# Patient Record
Sex: MALE | Race: ASIAN | NOT HISPANIC OR LATINO
[De-identification: names, ages, dates, MRNs, and addresses within clinical notes are randomized per-mention and may not be internally consistent; named-entity substitution may affect disease eponyms.]

---

## 2017-01-31 ENCOUNTER — APPOINTMENT (OUTPATIENT)
Dept: UROLOGY | Facility: CLINIC | Age: 57
End: 2017-01-31

## 2017-02-03 ENCOUNTER — NON-APPOINTMENT (OUTPATIENT)
Age: 57
End: 2017-02-03

## 2017-02-03 ENCOUNTER — APPOINTMENT (OUTPATIENT)
Dept: INTERNAL MEDICINE | Facility: CLINIC | Age: 57
End: 2017-02-03

## 2017-02-03 VITALS
RESPIRATION RATE: 14 BRPM | WEIGHT: 243 LBS | HEIGHT: 70.47 IN | DIASTOLIC BLOOD PRESSURE: 82 MMHG | TEMPERATURE: 98.1 F | OXYGEN SATURATION: 98 % | SYSTOLIC BLOOD PRESSURE: 122 MMHG | HEART RATE: 84 BPM | BODY MASS INDEX: 34.4 KG/M2

## 2017-02-03 DIAGNOSIS — F19.21 OTHER PSYCHOACTIVE SUBSTANCE DEPENDENCE, IN REMISSION: ICD-10-CM

## 2017-02-28 ENCOUNTER — RX RENEWAL (OUTPATIENT)
Age: 57
End: 2017-02-28

## 2017-10-19 ENCOUNTER — APPOINTMENT (OUTPATIENT)
Dept: UROLOGY | Facility: CLINIC | Age: 57
End: 2017-10-19
Payer: MEDICAID

## 2017-10-19 PROCEDURE — 99204 OFFICE O/P NEW MOD 45 MIN: CPT

## 2017-10-20 LAB
ALBUMIN SERPL ELPH-MCNC: 4.5 G/DL
ALP BLD-CCNC: 72 U/L
ALT SERPL-CCNC: 34 U/L
ANION GAP SERPL CALC-SCNC: 14 MMOL/L
AST SERPL-CCNC: 23 U/L
BILIRUB SERPL-MCNC: 0.6 MG/DL
BUN SERPL-MCNC: 12 MG/DL
CALCIUM SERPL-MCNC: 9.7 MG/DL
CHLORIDE SERPL-SCNC: 98 MMOL/L
CO2 SERPL-SCNC: 26 MMOL/L
CREAT SERPL-MCNC: 1.1 MG/DL
ESTRADIOL SERPL-MCNC: 23 PG/ML
GLUCOSE SERPL-MCNC: 258 MG/DL
POTASSIUM SERPL-SCNC: 4.7 MMOL/L
PROLACTIN SERPL-MCNC: 9 NG/ML
PROT SERPL-MCNC: 7.4 G/DL
PSA SERPL-MCNC: 1.48 NG/ML
SODIUM SERPL-SCNC: 138 MMOL/L

## 2017-10-26 ENCOUNTER — MOBILE ON CALL (OUTPATIENT)
Age: 57
End: 2017-10-26

## 2017-10-26 LAB
TESTOST BND SERPL-MCNC: 8.1 PG/ML
TESTOST SERPL-MCNC: 247.3 NG/DL

## 2017-11-15 ENCOUNTER — APPOINTMENT (OUTPATIENT)
Dept: UROLOGY | Facility: CLINIC | Age: 57
End: 2017-11-15
Payer: MEDICAID

## 2017-11-15 ENCOUNTER — LABORATORY RESULT (OUTPATIENT)
Age: 57
End: 2017-11-15

## 2017-11-15 PROCEDURE — 99213 OFFICE O/P EST LOW 20 MIN: CPT

## 2017-11-17 LAB
APPEARANCE: CLEAR
BACTERIA UR CULT: NORMAL
BACTERIA: NEGATIVE
BILIRUBIN URINE: NEGATIVE
BLOOD URINE: NEGATIVE
COLOR: YELLOW
GLUCOSE QUALITATIVE U: 1000 MG/DL
KETONES URINE: NEGATIVE
LEUKOCYTE ESTERASE URINE: NEGATIVE
MICROSCOPIC-UA: NORMAL
NITRITE URINE: NEGATIVE
PH URINE: 5
PROTEIN URINE: 30 MG/DL
RED BLOOD CELLS URINE: 3 /HPF
SPECIFIC GRAVITY URINE: 1.04
SQUAMOUS EPITHELIAL CELLS: 0 /HPF
TESTOST SERPL-MCNC: 209.9 NG/DL
UROBILINOGEN URINE: NEGATIVE MG/DL
WHITE BLOOD CELLS URINE: 1 /HPF

## 2017-12-06 ENCOUNTER — APPOINTMENT (OUTPATIENT)
Dept: UROLOGY | Facility: CLINIC | Age: 57
End: 2017-12-06
Payer: MEDICAID

## 2017-12-06 PROCEDURE — 99213 OFFICE O/P EST LOW 20 MIN: CPT

## 2018-02-07 ENCOUNTER — FORM ENCOUNTER (OUTPATIENT)
Age: 58
End: 2018-02-07

## 2018-02-08 ENCOUNTER — OUTPATIENT (OUTPATIENT)
Dept: OUTPATIENT SERVICES | Facility: HOSPITAL | Age: 58
LOS: 1 days | End: 2018-02-08
Payer: MEDICAID

## 2018-02-08 ENCOUNTER — APPOINTMENT (OUTPATIENT)
Dept: RADIOLOGY | Facility: IMAGING CENTER | Age: 58
End: 2018-02-08
Payer: MEDICAID

## 2018-02-08 DIAGNOSIS — E29.1 TESTICULAR HYPOFUNCTION: ICD-10-CM

## 2018-02-08 PROCEDURE — 77080 DXA BONE DENSITY AXIAL: CPT | Mod: 26

## 2018-02-08 PROCEDURE — 77080 DXA BONE DENSITY AXIAL: CPT

## 2018-06-27 ENCOUNTER — APPOINTMENT (OUTPATIENT)
Dept: UROLOGY | Facility: CLINIC | Age: 58
End: 2018-06-27
Payer: MEDICAID

## 2018-06-27 PROCEDURE — 99214 OFFICE O/P EST MOD 30 MIN: CPT

## 2018-06-28 LAB
ANION GAP SERPL CALC-SCNC: 18 MMOL/L
APPEARANCE: CLEAR
BACTERIA: NEGATIVE
BILIRUBIN URINE: NEGATIVE
BLOOD URINE: NEGATIVE
BUN SERPL-MCNC: 14 MG/DL
CALCIUM SERPL-MCNC: 9.3 MG/DL
CHLORIDE SERPL-SCNC: 96 MMOL/L
CO2 SERPL-SCNC: 23 MMOL/L
COLOR: YELLOW
CREAT SERPL-MCNC: 0.94 MG/DL
GLUCOSE QUALITATIVE U: 500 MG/DL
GLUCOSE SERPL-MCNC: 243 MG/DL
HBA1C MFR BLD HPLC: 10.5 %
HYALINE CASTS: 2 /LPF
KETONES URINE: NEGATIVE
LEUKOCYTE ESTERASE URINE: NEGATIVE
MICROSCOPIC-UA: NORMAL
NITRITE URINE: NEGATIVE
PH URINE: 5
POTASSIUM SERPL-SCNC: 4.9 MMOL/L
PROTEIN URINE: 30 MG/DL
RED BLOOD CELLS URINE: 0 /HPF
SODIUM SERPL-SCNC: 137 MMOL/L
SPECIFIC GRAVITY URINE: 1.02
SQUAMOUS EPITHELIAL CELLS: 0 /HPF
TESTOST SERPL-MCNC: 255 NG/DL
UROBILINOGEN URINE: NEGATIVE MG/DL
WHITE BLOOD CELLS URINE: 0 /HPF

## 2018-06-29 LAB — BACTERIA UR CULT: NORMAL

## 2018-09-06 ENCOUNTER — APPOINTMENT (OUTPATIENT)
Dept: INTERNAL MEDICINE | Facility: CLINIC | Age: 58
End: 2018-09-06
Payer: MEDICAID

## 2018-09-06 ENCOUNTER — NON-APPOINTMENT (OUTPATIENT)
Age: 58
End: 2018-09-06

## 2018-09-06 VITALS
OXYGEN SATURATION: 98 % | WEIGHT: 234 LBS | TEMPERATURE: 98.1 F | RESPIRATION RATE: 14 BRPM | BODY MASS INDEX: 33.5 KG/M2 | SYSTOLIC BLOOD PRESSURE: 130 MMHG | HEART RATE: 80 BPM | HEIGHT: 70 IN | DIASTOLIC BLOOD PRESSURE: 72 MMHG

## 2018-09-06 DIAGNOSIS — Z87.438 PERSONAL HISTORY OF OTHER DISEASES OF MALE GENITAL ORGANS: ICD-10-CM

## 2018-09-06 DIAGNOSIS — Z83.79 FAMILY HISTORY OF OTHER DISEASES OF THE DIGESTIVE SYSTEM: ICD-10-CM

## 2018-09-06 DIAGNOSIS — R31.29 OTHER MICROSCOPIC HEMATURIA: ICD-10-CM

## 2018-09-06 PROCEDURE — 93000 ELECTROCARDIOGRAM COMPLETE: CPT

## 2018-09-06 PROCEDURE — 99406 BEHAV CHNG SMOKING 3-10 MIN: CPT

## 2018-09-06 PROCEDURE — 99396 PREV VISIT EST AGE 40-64: CPT | Mod: 25

## 2018-09-06 PROCEDURE — 36415 COLL VENOUS BLD VENIPUNCTURE: CPT

## 2018-09-06 RX ORDER — FLUTICASONE PROPIONATE 50 UG/1
50 SPRAY, METERED NASAL DAILY
Qty: 1 | Refills: 2 | Status: DISCONTINUED | COMMUNITY
Start: 2017-02-03 | End: 2018-09-06

## 2018-09-06 RX ORDER — NYSTATIN 100000 [USP'U]/G
100000 CREAM TOPICAL TWICE DAILY
Qty: 2 | Refills: 0 | Status: DISCONTINUED | COMMUNITY
Start: 2017-10-19 | End: 2018-09-06

## 2018-09-06 RX ORDER — FENOFIBRATE 160 MG/1
160 TABLET ORAL DAILY
Qty: 1 | Refills: 1 | Status: DISCONTINUED | COMMUNITY
Start: 2017-02-03 | End: 2018-09-06

## 2018-09-08 PROBLEM — Z87.438 HISTORY OF PENILE PRURITUS: Status: RESOLVED | Noted: 2017-10-19 | Resolved: 2018-09-08

## 2018-09-08 PROBLEM — R31.29 MICROSCOPIC HEMATURIA: Status: RESOLVED | Noted: 2017-11-15 | Resolved: 2018-09-08

## 2018-09-08 NOTE — REVIEW OF SYSTEMS
[see HPI] : see HPI [Negative] : Respiratory [Fever] : no fever [Chills] : no chills [Feeling Poorly] : not feeling poorly [Sore Throat] : no sore throat [Hoarseness] : no hoarseness [Chest Pain] : no chest pain [Palpitations] : no palpitations [Lower Ext Edema] : no extremity edema [Shortness Of Breath] : no shortness of breath [Cough] : no cough [Orthopnea] : no orthopnea [Wheezing] : no wheezing [SOB on Exertion] : no shortness of breath during exertion [PND] : no PND [Abdominal Pain] : no abdominal pain [Vomiting] : no vomiting [Melena] : no melena [Dysuria] : no dysuria [Genital Lesion] : no genital lesions [Dizziness] : no dizziness [Limb Weakness] : no limb weakness [Suicidal] : not suicidal [Anxiety] : no anxiety [Depression] : no depression

## 2018-09-08 NOTE — COUNSELING
[Weight management counseling provided] : Weight management [Healthy eating counseling provided] : healthy eating [Activity counseling provided] : activity [Smoking cessation counseling provided] : smoking cessation [Behavioral health counseling provided] : behavioral health  [Quit Smoking] : Quit smoking [Participate in a class] : Participate in a class

## 2018-09-08 NOTE — HEALTH RISK ASSESSMENT
[Patient reported colonoscopy was normal] : Patient reported colonoscopy was normal [0] : 2) Feeling down, depressed, or hopeless: Not at all (0) [HIV Test offered] : HIV Test offered [Patient reported bone density results were normal] : Patient reported bone density results were normal [BoneDensityDate] : 2/18 [ColonoscopyDate] : 12/14 [HIVDate] : 12/16 [HIVComments] : negative [HepatitisCDate] : 12/16 [HepatitisCComments] : negative

## 2018-09-08 NOTE — HISTORY OF PRESENT ILLNESS
[Health Maintenance] : health maintenance [___ Year(s) Ago] : [unfilled] year(s) ago [Spouse] : spouse [___ Daughter(s)] : [unfilled] daughter(s) [] :  [Working Full Time] : working full time [Occupation ___] : occupation: [unfilled] [Current Cigarette Smoker] : is a current cigarette smoker [Ready] : is ready to quit using tobacco [Wants Resources] : wants resources to help with quitting [Occasional Use] : occasional alcohol use [Former User] : has previously used illicit drugs [Marijuana] : marijuana [Cocaine] : cocaine [Good] : good [Reg. Dental Visits] : He has regular dental visits [FreeTextEntry1] : \par Feeling well today.\par Fasting for labs.\par Needs med refill.\par \par Last seen in office 2/3/17 at last CPE, states feeling well since.  Denies ER visits or hospitalizations.\par \par \par c/o left shoulder pain x 1 week with onset after lifting heavy suit case on trip.  Sx's stable.  Mainly focal pain, occ right upper neck and upper arm also involved, constant, worse with certain positions.  \par -no paresthesias, joint swelling, redness or weakness\par -took 1 advil x1 few days ago, none since.\par \par Has lost ~ 9 lbs since last visit \par Reports nl appetite and BMs\par Eating healthy, more veggies, less sugar\par Not exercising regularly, walks for 1 hr on occasion- done w/o sx's or limitation\par \par DM-- not interested in medications, cut down on carbs\par -hx metformin, self d/c'd > 1 yr ago as did not want to be on meds, denies SEs with use\par -hx home fs, not done recently as needs more strips\par -hx optho eval 3/16\par -hx podiatry in 11/14, denies foot complaints.\par \par HLD--\par -hx fenofibrate, self d/c'd > 1 yr ago as did not want to take meds, denies SEs\par \par hx PINKY--on CPAP, sleeping well\par -hx sleep study 1/16.\par -hx followed by Dr. Ibrahima hogan\par \par hx depression-- feels is resolved, but occ has trouble focusing; denies HI/SI\par -hx marital therapist eval with wife in 2016, felt was helpful\par -psychiatry eval pending, wants referral again\par \par hx hypogonadism, microscopic hematuria, ED\par -followed by LORAINE\par -on viagra prn with help\par -denies any urinary complaints\par \par +cigs-- smokes 1/2 ppd > 30 yrs interested to quit, no hx attempt with med use in past, interested in using patch, open to referral to cessation center\par -hx occ cough a/w postnasal drip, improved with flonase use prn\par \par  [Binge Drinking] : denies binge drinking [Patient Concern] : no personal concern about alcohol use [Vision Problems] : He denies vision problems [Hearing Loss] : He denies hearing loss [de-identified] : 1/2 ppd > 30 yrs [de-identified] : recreational [de-identified] : snorted cocaine, smoked marijuana; denies hx IVDA [de-identified] : declines flu shots, no hx hep B series, Tdap or PVX--declines; no hx shingles- declines

## 2018-09-08 NOTE — ASSESSMENT
[FreeTextEntry1] : \par shoulder pain- left, likely tendonitis\par -advised avoidance of heavy lifting/bag\par -advised ice/heat, Tylenol prn\par -to f/u if sx's worsen or do not resolve to consider xray/ortho eval at that time\par \par DM- 6/1840T1s-1.05 (was 9.2); 12/16 microalbumin 58; has lost some wt intentionally\par -EKG today: NSR @ 79 bpm, nl axis, no LVH, no path Q/ST chgs (no chg c/w 9/14)\par -declines medications, willing to reconsider after repeat labs today\par -check A1c and microalbumin \par -hx metformin self d/c\par -encouraged home fs checks, Rx for strips escribed\par -ADA diet, exercise and wt loss counseled.  \par -hx f/u with DM educator, s/p sessions- declines f/u\par -hx ophto eval 3/16, yearly advised, referral given\par -hx pod eval 11/14, using orthotics--> yearly advised and referral given. \par -adverse health issues associated with poorly controlled DM counseled (ie MI, renal failure, vision impairment, etc.) \par \par HLD/TG--12/16 Tchol 259 TG 1028 (was 829) HDL 29; LFTs nl\par -check lipids/LFTs\par -hx fenofibrate self d/c, hesitant to restart but willing to consider after repeat labs today \par -hx declining statin for CV risk reduction in setting of DM\par -low fat diet, exercise and wt loss \par -hx nutrition eval, declines f/u\par \par PINKY--hx CPAP, sleeping well, some concentration issues\par -hx followed by ENT\par -hx followed by Dr. Ibrahima hogan- s/p eval and repeat sleep study 1/16- f/u encouraged \par \par hx depression/anxiety--resolved per pt; denies HI/SI\par -psychiatry referral given prior--pending\par -hx martial therapy, advised to consider f/u as needed\par -advised to f/u if sx's worsen\par \par hx allergic rhinitis- hx occ cough a/w postnasal drip\par -11/14 cxr negative\par -cont flonase prn\par \par microscopic hematuria, hypogonadism, ED- \par -11/17 urine cytology negative\par -10/17 PSA wnl\par -2/18 DEXA wnl\par -followed by LORAINE, seen 6/18- advised f/u in 6 mo\par -on viagra prn\par \par obesity--\par -healthy eating, exercise and cont'd wt loss advised\par \par vit d def-- \par -check level\par \par \par HCM\par --check screening labs; agreeable to HIV/STD screening\par --12/16 hep C screening negative\par --declines flu shots\par --declines hep B series/Tdap/ PVX/shingles vaccines\par --hx screening colonoscopy 12/15/14 (Dr. Couch), +int/ext hemorrhoids.  Rec repeat in 5 yrs\par --hx nl full skin exam by derm 2/15 per pt, yearly advised.  Regular use of sun block for skin cancer prevention counseled\par --smoking cessation and cont'd cessation of illicit drugs encouraged. [facility] cessation center info given again.  Rx for nicotine patches escribed.  Consider screening CT chest at next visit.\par \par \par Pt's cell: [phone]\par

## 2018-09-09 LAB
25(OH)D3 SERPL-MCNC: 16.7 NG/ML
ALBUMIN SERPL ELPH-MCNC: 4.5 G/DL
ALP BLD-CCNC: 52 U/L
ALT SERPL-CCNC: 28 U/L
ANION GAP SERPL CALC-SCNC: 15 MMOL/L
AST SERPL-CCNC: 23 U/L
BASOPHILS # BLD AUTO: 0.05 K/UL
BASOPHILS NFR BLD AUTO: 0.8 %
BILIRUB SERPL-MCNC: 0.9 MG/DL
BUN SERPL-MCNC: 10 MG/DL
C TRACH RRNA SPEC QL NAA+PROBE: NOT DETECTED
CALCIUM SERPL-MCNC: 9.2 MG/DL
CHLORIDE SERPL-SCNC: 102 MMOL/L
CHOLEST SERPL-MCNC: 286 MG/DL
CHOLEST/HDLC SERPL: 10.6 RATIO
CO2 SERPL-SCNC: 21 MMOL/L
CREAT SERPL-MCNC: 0.76 MG/DL
CREAT SPEC-SCNC: 243 MG/DL
CREAT SPEC-SCNC: 243 MG/DL
CREAT/PROT UR: 0.2 RATIO
EOSINOPHIL # BLD AUTO: 0.17 K/UL
EOSINOPHIL NFR BLD AUTO: 2.7 %
GLUCOSE SERPL-MCNC: 200 MG/DL
HBA1C MFR BLD HPLC: 10.4 %
HBV CORE IGG+IGM SER QL: NONREACTIVE
HBV SURFACE AB SER QL: NONREACTIVE
HBV SURFACE AG SER QL: NONREACTIVE
HCT VFR BLD CALC: 46.7 %
HCV AB SER QL: NONREACTIVE
HCV S/CO RATIO: 0.2 S/CO
HDLC SERPL-MCNC: 27 MG/DL
HGB BLD-MCNC: 15.8 G/DL
HIV1+2 AB SPEC QL IA.RAPID: NONREACTIVE
IMM GRANULOCYTES NFR BLD AUTO: 0.2 %
LDLC SERPL CALC-MCNC: NORMAL
LYMPHOCYTES # BLD AUTO: 2.07 K/UL
LYMPHOCYTES NFR BLD AUTO: 33.3 %
MAN DIFF?: NORMAL
MCHC RBC-ENTMCNC: 30.2 PG
MCHC RBC-ENTMCNC: 33.8 GM/DL
MCV RBC AUTO: 89.1 FL
MICROALBUMIN 24H UR DL<=1MG/L-MCNC: 15.8 MG/DL
MICROALBUMIN/CREAT 24H UR-RTO: 65 MG/G
MONOCYTES # BLD AUTO: 0.35 K/UL
MONOCYTES NFR BLD AUTO: 5.6 %
N GONORRHOEA RRNA SPEC QL NAA+PROBE: NOT DETECTED
NEUTROPHILS # BLD AUTO: 3.56 K/UL
NEUTROPHILS NFR BLD AUTO: 57.4 %
PLATELET # BLD AUTO: 192 K/UL
POTASSIUM SERPL-SCNC: 4 MMOL/L
PROT SERPL-MCNC: 7.5 G/DL
PROT UR-MCNC: 45 MG/DL
RBC # BLD: 5.24 M/UL
RBC # FLD: 13.2 %
SODIUM SERPL-SCNC: 138 MMOL/L
SOURCE AMPLIFICATION: NORMAL
T PALLIDUM AB SER QL IA: NEGATIVE
TRIGL SERPL-MCNC: 993 MG/DL
TSH SERPL-ACNC: 0.87 UIU/ML
WBC # FLD AUTO: 6.21 K/UL

## 2018-10-17 ENCOUNTER — APPOINTMENT (OUTPATIENT)
Dept: INTERNAL MEDICINE | Facility: CLINIC | Age: 58
End: 2018-10-17

## 2019-02-06 ENCOUNTER — APPOINTMENT (OUTPATIENT)
Dept: INTERNAL MEDICINE | Facility: CLINIC | Age: 59
End: 2019-02-06

## 2020-01-29 ENCOUNTER — APPOINTMENT (OUTPATIENT)
Dept: UROLOGY | Facility: CLINIC | Age: 60
End: 2020-01-29
Payer: MEDICAID

## 2020-01-29 DIAGNOSIS — R31.29 OTHER MICROSCOPIC HEMATURIA: ICD-10-CM

## 2020-01-29 PROCEDURE — 99213 OFFICE O/P EST LOW 20 MIN: CPT

## 2020-01-29 NOTE — PHYSICAL EXAM
[Penis Abnormality] : normal uncircumcised penis [Urethral Meatus] : meatus normal [Urinary Bladder Findings] : the bladder was normal on palpation [Scrotum] : the scrotum was normal [No Prostate Nodules] : no prostate nodules [Testes Mass (___cm)] : there were no testicular masses [] : no rash [FreeTextEntry1] : no evidence of candida

## 2020-01-29 NOTE — ASSESSMENT
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  Will continue sildenafil\par Will change to sildenafil 100 mg\par emphasized change in tablet dose and not to exceed 100 mg\par emphasized long term effect of untreated diabetes mellitus\par \par Hypogonadism had been rechecked and was normal\par Patient states maintained libido\par Will reassess today\par reasonably satisfied with sexual function \par Not interested in further intervention at this time\par \par Discussed PSA monitoring and controversy. \par Discussed fact that insurance/medicare would not pay.\par Patient wants to proceed,\par \par

## 2020-01-29 NOTE — HISTORY OF PRESENT ILLNESS
[None] : no symptoms [Nocturia] : nocturia [Erectile Dysfunction] : Erectile Dysfunction [FreeTextEntry1] : 57 year old   with 3 children complaining of penile discomfort x 3 weeks\par Started with itching and itching\par Urinating well; no dysuria etc\par Also complaining of Erectile dysfunction.\par Not attempting SI x several years.\par Loss of libido\par FRANCES 1\par Conflict with partner (unrelated to sexual difficulties)\par Other partners not able to have SI\par + ejaculation\par \par 11.15.2017\par Patient returns to review progress with penile pain and ED\par Topical therapy and hygiene has resulted in resolution of penile pain\par Erectile function has improved with sildenafil; Has not achieved rigid erection but has only utilized sildenafil 40 mg.\par \par 12.6.2017\par Patient returns to followup on:\par 1. penile pain\par 2. erectile dysfunction\par 3. hypogonadism\par 4. microscopic hematuria\par \par 6.27.2018\par no further penile pain\par erection excellent on sildenafil 100 mg\par no urinary symptoms\par  \par \par 1.29.2020\par continues on sildenfil\par getting erection \par sexually active\par has followed up re. diabetes mellitus but has not taken medication

## 2020-01-30 LAB
APPEARANCE: CLEAR
BACTERIA: NEGATIVE
BILIRUBIN URINE: NEGATIVE
BLOOD URINE: NEGATIVE
COLOR: YELLOW
ESTIMATED AVERAGE GLUCOSE: 266 MG/DL
GLUCOSE QUALITATIVE U: ABNORMAL
HBA1C MFR BLD HPLC: 10.9 %
HYALINE CASTS: 1 /LPF
KETONES URINE: NORMAL
LEUKOCYTE ESTERASE URINE: NEGATIVE
MICROSCOPIC-UA: NORMAL
NITRITE URINE: NEGATIVE
PH URINE: 5.5
PROTEIN URINE: ABNORMAL
PSA SERPL-MCNC: 1.79 NG/ML
RED BLOOD CELLS URINE: 3 /HPF
SPECIFIC GRAVITY URINE: 1.05
SQUAMOUS EPITHELIAL CELLS: 0 /HPF
TESTOST SERPL-MCNC: 304 NG/DL
UROBILINOGEN URINE: NORMAL
WHITE BLOOD CELLS URINE: 1 /HPF

## 2020-04-21 ENCOUNTER — APPOINTMENT (OUTPATIENT)
Dept: GASTROENTEROLOGY | Facility: CLINIC | Age: 60
End: 2020-04-21

## 2020-05-11 ENCOUNTER — APPOINTMENT (OUTPATIENT)
Dept: GASTROENTEROLOGY | Facility: CLINIC | Age: 60
End: 2020-05-11
Payer: MEDICAID

## 2020-05-11 VITALS — BODY MASS INDEX: 30.8 KG/M2 | HEIGHT: 71 IN | WEIGHT: 220 LBS

## 2020-05-11 DIAGNOSIS — R80.9 PROTEINURIA, UNSPECIFIED: ICD-10-CM

## 2020-05-11 PROCEDURE — 99214 OFFICE O/P EST MOD 30 MIN: CPT | Mod: 95

## 2020-05-11 RX ORDER — FLUTICASONE PROPIONATE 50 UG/1
50 SPRAY, METERED NASAL
Qty: 16 | Refills: 3 | Status: DISCONTINUED | COMMUNITY
Start: 2018-09-06 | End: 2020-05-11

## 2020-05-11 RX ORDER — NICOTINE POLACRILEX 2 MG/1
2 LOZENGE ORAL
Qty: 1 | Refills: 4 | Status: DISCONTINUED | COMMUNITY
Start: 2018-09-06 | End: 2020-05-11

## 2020-05-11 RX ORDER — NYSTATIN AND TRIAMCINOLONE ACETONIDE 100000; 1 MG/G; MG/G
100000-0.1 CREAM TOPICAL TWICE DAILY
Qty: 5 | Refills: 0 | Status: DISCONTINUED | COMMUNITY
Start: 2020-01-29 | End: 2020-05-11

## 2020-05-11 RX ORDER — NICOTINE 21 MG/24HR
14 PATCH, TRANSDERMAL 24 HOURS TRANSDERMAL
Qty: 14 | Refills: 0 | Status: DISCONTINUED | COMMUNITY
Start: 2018-09-06 | End: 2020-05-11

## 2020-05-11 RX ORDER — SILDENAFIL 20 MG/1
20 TABLET ORAL
Qty: 30 | Refills: 1 | Status: DISCONTINUED | COMMUNITY
Start: 2017-10-19 | End: 2020-05-11

## 2020-05-11 RX ORDER — ERGOCALCIFEROL 1.25 MG/1
1.25 MG CAPSULE, LIQUID FILLED ORAL
Qty: 8 | Refills: 0 | Status: DISCONTINUED | COMMUNITY
Start: 2018-09-09 | End: 2020-05-11

## 2020-05-11 RX ORDER — BLOOD SUGAR DIAGNOSTIC
STRIP MISCELLANEOUS
Qty: 1 | Refills: 5 | Status: DISCONTINUED | COMMUNITY
Start: 2018-09-06 | End: 2020-05-11

## 2020-05-11 RX ORDER — PHENYLEPHRINE HCL 10 MG
7 TABLET ORAL
Qty: 14 | Refills: 0 | Status: DISCONTINUED | COMMUNITY
Start: 2018-09-06 | End: 2020-05-11

## 2020-05-11 RX ORDER — NICOTINE 21 MG/24HR
21 PATCH, TRANSDERMAL 24 HOURS TRANSDERMAL
Qty: 42 | Refills: 0 | Status: DISCONTINUED | COMMUNITY
Start: 2018-09-06 | End: 2020-05-11

## 2020-05-11 RX ORDER — LISINOPRIL 2.5 MG/1
2.5 TABLET ORAL DAILY
Qty: 30 | Refills: 1 | Status: DISCONTINUED | COMMUNITY
Start: 2018-09-09 | End: 2020-05-11

## 2020-05-11 NOTE — CONSULT LETTER
[Dear  ___] : Dear  [unfilled], [Consult Letter:] : I had the pleasure of evaluating your patient, [unfilled]. [( Thank you for referring [unfilled] for consultation for _____ )] : Thank you for referring [unfilled] for consultation for [unfilled] [Please see my note below.] : Please see my note below. [FreeTextEntry3] : Fredis Couch MD [Consult Closing:] : Thank you very much for allowing me to participate in the care of this patient.  If you have any questions, please do not hesitate to contact me. [Sincerely,] : Sincerely,

## 2020-05-11 NOTE — HISTORY OF PRESENT ILLNESS
[FreeTextEntry1] : This is a telehealth visit.  Ellis was last seen in our office in 2014.  He has been feeling well and has no complaints referrable to the GI tract.  Denies change in bowel habits, abdominal pain, blood in the stool, nausea, vomiting or heartburn.  He has not been taking any medication, but is aware that he has diabetes and hypercholesterolemia.  His last hemoglobin A1c = 10.9.  Colonoscopy on December 15, 2014 revealed internal and external hemorrhoids.

## 2020-05-11 NOTE — REASON FOR VISIT
[Home] : at home, [unfilled] , at the time of the visit. [Medical Office: ([facility])___] : at the medical office located in  [Patient] : the patient [Self] : self [Follow-Up: _____] : a [unfilled] follow-up visit [FreeTextEntry4] : Brett Dent [FreeTextEntry1] : Pre colonoscopy visit

## 2020-05-11 NOTE — ASSESSMENT
[FreeTextEntry1] : 1.  History of rectal bleeding most likely secondary to internal hemorrhoids-colonoscopy December 15, 2014 revealed internal and external hemorrhoids-rule out colonic polyps.\par 2.  Type 2 diabetes with proteinuria.\par 3.  Hypercholesterolemia.\par 4.  Obesity.\par 5.  Obstructive sleep apnea.\par \par Plan:\par 1.  The patient was advised to schedule a colonoscopy.  The procedure, material risks, benefits and alternatives were discussed with the patient at length.  Brochure given. MiraLax prep.  The procedure will be scheduled as soon as it is feasible.  The patient was mailed the instructions for the procedure.\par 2.  Blood test results were reviewed.\par 3.  The patient was urged to contact his PCP, Dr. Dinora Tucker, for treatment of diabetes.\par \par

## 2020-05-11 NOTE — PHYSICAL EXAM
[General Appearance - Alert] : alert [General Appearance - In No Acute Distress] : in no acute distress [General Appearance - Well Developed] : well developed [General Appearance - Well-Appearing] : healthy appearing [] : normal voice and communication [FreeTextEntry1] : Obese

## 2020-06-21 ENCOUNTER — TRANSCRIPTION ENCOUNTER (OUTPATIENT)
Age: 60
End: 2020-06-21

## 2020-06-21 ENCOUNTER — APPOINTMENT (OUTPATIENT)
Dept: DISASTER EMERGENCY | Facility: CLINIC | Age: 60
End: 2020-06-21

## 2020-06-22 LAB — SARS-COV-2 N GENE NPH QL NAA+PROBE: NOT DETECTED

## 2020-06-24 ENCOUNTER — APPOINTMENT (OUTPATIENT)
Dept: GASTROENTEROLOGY | Facility: CLINIC | Age: 60
End: 2020-06-24
Payer: MEDICAID

## 2020-06-24 PROCEDURE — 45378 DIAGNOSTIC COLONOSCOPY: CPT

## 2020-09-23 ENCOUNTER — INPATIENT (INPATIENT)
Facility: HOSPITAL | Age: 60
LOS: 3 days | Discharge: HOME | End: 2020-09-27
Payer: MEDICAID

## 2020-09-23 VITALS
DIASTOLIC BLOOD PRESSURE: 89 MMHG | SYSTOLIC BLOOD PRESSURE: 131 MMHG | HEART RATE: 85 BPM | OXYGEN SATURATION: 98 % | TEMPERATURE: 98 F | RESPIRATION RATE: 16 BRPM

## 2020-09-23 DIAGNOSIS — K85.80 OTHER ACUTE PANCREATITIS WITHOUT NECROSIS OR INFECTION: ICD-10-CM

## 2020-09-23 LAB
ALBUMIN SERPL ELPH-MCNC: 4.1 G/DL — SIGNIFICANT CHANGE UP (ref 3.3–5)
ALBUMIN SERPL ELPH-MCNC: 4.3 G/DL — SIGNIFICANT CHANGE UP (ref 3.3–5)
ALP SERPL-CCNC: 47 U/L — SIGNIFICANT CHANGE UP (ref 40–120)
ALP SERPL-CCNC: 59 U/L — SIGNIFICANT CHANGE UP (ref 40–120)
ALT FLD-CCNC: 21 U/L — SIGNIFICANT CHANGE UP (ref 4–41)
ALT FLD-CCNC: 21 U/L — SIGNIFICANT CHANGE UP (ref 4–41)
ANION GAP SERPL CALC-SCNC: 12 MMO/L — SIGNIFICANT CHANGE UP (ref 7–14)
ANION GAP SERPL CALC-SCNC: 12 MMO/L — SIGNIFICANT CHANGE UP (ref 7–14)
AST SERPL-CCNC: 45 U/L — HIGH (ref 4–40)
AST SERPL-CCNC: 5 U/L — SIGNIFICANT CHANGE UP (ref 4–40)
BASE EXCESS BLDV CALC-SCNC: -0.8 MMOL/L — SIGNIFICANT CHANGE UP
BASE EXCESS BLDV CALC-SCNC: -1.6 MMOL/L — SIGNIFICANT CHANGE UP
BASOPHILS # BLD AUTO: 0.05 K/UL — SIGNIFICANT CHANGE UP (ref 0–0.2)
BASOPHILS NFR BLD AUTO: 0.4 % — SIGNIFICANT CHANGE UP (ref 0–2)
BILIRUB SERPL-MCNC: 0.5 MG/DL — SIGNIFICANT CHANGE UP (ref 0.2–1.2)
BILIRUB SERPL-MCNC: 0.7 MG/DL — SIGNIFICANT CHANGE UP (ref 0.2–1.2)
BLD GP AB SCN SERPL QL: NEGATIVE — SIGNIFICANT CHANGE UP
BLOOD GAS VENOUS - CREATININE: 0.78 MG/DL — SIGNIFICANT CHANGE UP (ref 0.5–1.3)
BLOOD GAS VENOUS - CREATININE: SIGNIFICANT CHANGE UP MG/DL (ref 0.5–1.3)
BLOOD GAS VENOUS - FIO2: 21 — SIGNIFICANT CHANGE UP
BLOOD GAS VENOUS - FIO2: 21 — SIGNIFICANT CHANGE UP
BUN SERPL-MCNC: 10 MG/DL — SIGNIFICANT CHANGE UP (ref 7–23)
BUN SERPL-MCNC: 8 MG/DL — SIGNIFICANT CHANGE UP (ref 7–23)
CALCIUM SERPL-MCNC: 8.5 MG/DL — SIGNIFICANT CHANGE UP (ref 8.4–10.5)
CALCIUM SERPL-MCNC: 8.7 MG/DL — SIGNIFICANT CHANGE UP (ref 8.4–10.5)
CHLORIDE BLDV-SCNC: 104 MMOL/L — SIGNIFICANT CHANGE UP (ref 96–108)
CHLORIDE BLDV-SCNC: 107 MMOL/L — SIGNIFICANT CHANGE UP (ref 96–108)
CHLORIDE SERPL-SCNC: 93 MMOL/L — LOW (ref 98–107)
CHLORIDE SERPL-SCNC: 96 MMOL/L — LOW (ref 98–107)
CHOLEST SERPL-MCNC: 559 MG/DL — HIGH (ref 120–199)
CO2 SERPL-SCNC: 24 MMOL/L — SIGNIFICANT CHANGE UP (ref 22–31)
CO2 SERPL-SCNC: 27 MMOL/L — SIGNIFICANT CHANGE UP (ref 22–31)
CREAT SERPL-MCNC: 0.52 MG/DL — SIGNIFICANT CHANGE UP (ref 0.5–1.3)
CREAT SERPL-MCNC: 0.53 MG/DL — SIGNIFICANT CHANGE UP (ref 0.5–1.3)
EOSINOPHIL # BLD AUTO: 0.09 K/UL — SIGNIFICANT CHANGE UP (ref 0–0.5)
EOSINOPHIL NFR BLD AUTO: 0.8 % — SIGNIFICANT CHANGE UP (ref 0–6)
GAS PNL BLDV: 129 MMOL/L — LOW (ref 136–146)
GAS PNL BLDV: 140 MMOL/L — SIGNIFICANT CHANGE UP (ref 136–146)
GLUCOSE BLDC GLUCOMTR-MCNC: 172 MG/DL — HIGH (ref 70–99)
GLUCOSE BLDC GLUCOMTR-MCNC: 183 MG/DL — HIGH (ref 70–99)
GLUCOSE BLDC GLUCOMTR-MCNC: 195 MG/DL — HIGH (ref 70–99)
GLUCOSE BLDC GLUCOMTR-MCNC: 198 MG/DL — HIGH (ref 70–99)
GLUCOSE BLDC GLUCOMTR-MCNC: 209 MG/DL — HIGH (ref 70–99)
GLUCOSE BLDC GLUCOMTR-MCNC: 214 MG/DL — HIGH (ref 70–99)
GLUCOSE BLDC GLUCOMTR-MCNC: 217 MG/DL — HIGH (ref 70–99)
GLUCOSE BLDC GLUCOMTR-MCNC: 224 MG/DL — HIGH (ref 70–99)
GLUCOSE BLDC GLUCOMTR-MCNC: 230 MG/DL — HIGH (ref 70–99)
GLUCOSE BLDC GLUCOMTR-MCNC: 237 MG/DL — HIGH (ref 70–99)
GLUCOSE BLDV-MCNC: 210 MG/DL — HIGH (ref 70–99)
GLUCOSE BLDV-MCNC: 258 MG/DL — HIGH (ref 70–99)
GLUCOSE SERPL-MCNC: 167 MG/DL — HIGH (ref 70–99)
GLUCOSE SERPL-MCNC: 260 MG/DL — HIGH (ref 70–99)
HCO3 BLDV-SCNC: 21 MMOL/L — SIGNIFICANT CHANGE UP (ref 20–27)
HCO3 BLDV-SCNC: 24 MMOL/L — SIGNIFICANT CHANGE UP (ref 20–27)
HCT VFR BLD CALC: 45.1 % — SIGNIFICANT CHANGE UP (ref 39–50)
HCT VFR BLDV CALC: 47.8 % — SIGNIFICANT CHANGE UP (ref 39–51)
HCT VFR BLDV CALC: 49.3 % — SIGNIFICANT CHANGE UP (ref 39–51)
HDLC SERPL-MCNC: 17 MG/DL — LOW (ref 35–55)
HGB BLD-MCNC: 15.1 G/DL — SIGNIFICANT CHANGE UP (ref 13–17)
HGB BLDV-MCNC: 15.6 G/DL — SIGNIFICANT CHANGE UP (ref 13–17)
HGB BLDV-MCNC: 16.1 G/DL — SIGNIFICANT CHANGE UP (ref 13–17)
IMM GRANULOCYTES NFR BLD AUTO: 0.5 % — SIGNIFICANT CHANGE UP (ref 0–1.5)
INR BLD: SIGNIFICANT CHANGE UP (ref 0.88–1.16)
LACTATE BLDV-MCNC: 1.5 MMOL/L — SIGNIFICANT CHANGE UP (ref 0.5–2)
LACTATE BLDV-MCNC: 1.8 MMOL/L — SIGNIFICANT CHANGE UP (ref 0.5–2)
LIDOCAIN IGE QN: 2891.4 U/L — HIGH (ref 7–60)
LIPID PNL WITH DIRECT LDL SERPL: 27 MG/DL — SIGNIFICANT CHANGE UP
LYMPHOCYTES # BLD AUTO: 1.48 K/UL — SIGNIFICANT CHANGE UP (ref 1–3.3)
LYMPHOCYTES # BLD AUTO: 12.9 % — LOW (ref 13–44)
MAGNESIUM SERPL-MCNC: 1.6 MG/DL — SIGNIFICANT CHANGE UP (ref 1.6–2.6)
MCHC RBC-ENTMCNC: 29.5 PG — SIGNIFICANT CHANGE UP (ref 27–34)
MCHC RBC-ENTMCNC: 33.4 % — SIGNIFICANT CHANGE UP (ref 32–36)
MCV RBC AUTO: 88.1 FL — SIGNIFICANT CHANGE UP (ref 80–100)
MONOCYTES # BLD AUTO: 0.86 K/UL — SIGNIFICANT CHANGE UP (ref 0–0.9)
MONOCYTES NFR BLD AUTO: 7.5 % — SIGNIFICANT CHANGE UP (ref 2–14)
NEUTROPHILS # BLD AUTO: 8.94 K/UL — HIGH (ref 1.8–7.4)
NEUTROPHILS NFR BLD AUTO: 77.9 % — HIGH (ref 43–77)
NRBC # FLD: 0 K/UL — SIGNIFICANT CHANGE UP (ref 0–0)
PCO2 BLDV: 35 MMHG — LOW (ref 41–51)
PCO2 BLDV: 48 MMHG — SIGNIFICANT CHANGE UP (ref 41–51)
PH BLDV: 7.31 PH — LOW (ref 7.32–7.43)
PH BLDV: 7.43 PH — SIGNIFICANT CHANGE UP (ref 7.32–7.43)
PHOSPHATE SERPL-MCNC: 2.5 MG/DL — SIGNIFICANT CHANGE UP (ref 2.5–4.5)
PLATELET # BLD AUTO: 162 K/UL — SIGNIFICANT CHANGE UP (ref 150–400)
PMV BLD: 11.6 FL — SIGNIFICANT CHANGE UP (ref 7–13)
PO2 BLDV: 26 MMHG — LOW (ref 35–40)
PO2 BLDV: 54 MMHG — HIGH (ref 35–40)
POTASSIUM BLDV-SCNC: 3.7 MMOL/L — SIGNIFICANT CHANGE UP (ref 3.4–4.5)
POTASSIUM BLDV-SCNC: 7.1 MMOL/L — CRITICAL HIGH (ref 3.4–4.5)
POTASSIUM SERPL-MCNC: 4 MMOL/L — SIGNIFICANT CHANGE UP (ref 3.5–5.3)
POTASSIUM SERPL-MCNC: 4.5 MMOL/L — SIGNIFICANT CHANGE UP (ref 3.5–5.3)
POTASSIUM SERPL-SCNC: 4 MMOL/L — SIGNIFICANT CHANGE UP (ref 3.5–5.3)
POTASSIUM SERPL-SCNC: 4.5 MMOL/L — SIGNIFICANT CHANGE UP (ref 3.5–5.3)
PROT SERPL-MCNC: 5.1 G/DL — LOW (ref 6–8.3)
PROT SERPL-MCNC: 5.8 G/DL — LOW (ref 6–8.3)
PROTHROM AB SERPL-ACNC: SIGNIFICANT CHANGE UP SEC (ref 10.6–13.6)
RBC # BLD: 5.12 M/UL — SIGNIFICANT CHANGE UP (ref 4.2–5.8)
RBC # FLD: 12.8 % — SIGNIFICANT CHANGE UP (ref 10.3–14.5)
RH IG SCN BLD-IMP: POSITIVE — SIGNIFICANT CHANGE UP
SAO2 % BLDV: 42.9 % — LOW (ref 60–85)
SAO2 % BLDV: 91 % — HIGH (ref 60–85)
SODIUM SERPL-SCNC: 132 MMOL/L — LOW (ref 135–145)
SODIUM SERPL-SCNC: 132 MMOL/L — LOW (ref 135–145)
TRIGL SERPL-MCNC: 2494 MG/DL — HIGH (ref 10–149)
TRIGL SERPL-MCNC: 3826 MG/DL — HIGH (ref 10–149)
WBC # BLD: 11.48 K/UL — HIGH (ref 3.8–10.5)
WBC # FLD AUTO: 11.48 K/UL — HIGH (ref 3.8–10.5)

## 2020-09-23 PROCEDURE — 74177 CT ABD & PELVIS W/CONTRAST: CPT | Mod: 26

## 2020-09-23 PROCEDURE — 71045 X-RAY EXAM CHEST 1 VIEW: CPT | Mod: 26

## 2020-09-23 PROCEDURE — 99291 CRITICAL CARE FIRST HOUR: CPT

## 2020-09-23 PROCEDURE — 99285 EMERGENCY DEPT VISIT HI MDM: CPT

## 2020-09-23 PROCEDURE — 76705 ECHO EXAM OF ABDOMEN: CPT | Mod: 26

## 2020-09-23 RX ORDER — THIAMINE MONONITRATE (VIT B1) 100 MG
100 TABLET ORAL DAILY
Refills: 0 | Status: DISCONTINUED | OUTPATIENT
Start: 2020-09-23 | End: 2020-09-24

## 2020-09-23 RX ORDER — INSULIN HUMAN 100 [IU]/ML
4 INJECTION, SOLUTION SUBCUTANEOUS
Qty: 100 | Refills: 0 | Status: DISCONTINUED | OUTPATIENT
Start: 2020-09-23 | End: 2020-09-25

## 2020-09-23 RX ORDER — MORPHINE SULFATE 50 MG/1
2 CAPSULE, EXTENDED RELEASE ORAL ONCE
Refills: 0 | Status: DISCONTINUED | OUTPATIENT
Start: 2020-09-23 | End: 2020-09-23

## 2020-09-23 RX ORDER — LACTULOSE 10 G/15ML
10 SOLUTION ORAL ONCE
Refills: 0 | Status: COMPLETED | OUTPATIENT
Start: 2020-09-23 | End: 2020-09-23

## 2020-09-23 RX ORDER — MORPHINE SULFATE 50 MG/1
4 CAPSULE, EXTENDED RELEASE ORAL ONCE
Refills: 0 | Status: DISCONTINUED | OUTPATIENT
Start: 2020-09-23 | End: 2020-09-23

## 2020-09-23 RX ORDER — CHLORHEXIDINE GLUCONATE 213 G/1000ML
1 SOLUTION TOPICAL
Refills: 0 | Status: DISCONTINUED | OUTPATIENT
Start: 2020-09-23 | End: 2020-09-25

## 2020-09-23 RX ORDER — ONDANSETRON 8 MG/1
4 TABLET, FILM COATED ORAL ONCE
Refills: 0 | Status: COMPLETED | OUTPATIENT
Start: 2020-09-23 | End: 2020-09-23

## 2020-09-23 RX ORDER — SODIUM CHLORIDE 9 MG/ML
1000 INJECTION, SOLUTION INTRAVENOUS ONCE
Refills: 0 | Status: COMPLETED | OUTPATIENT
Start: 2020-09-23 | End: 2020-09-23

## 2020-09-23 RX ORDER — SODIUM CHLORIDE 9 MG/ML
1000 INJECTION, SOLUTION INTRAVENOUS
Refills: 0 | Status: DISCONTINUED | OUTPATIENT
Start: 2020-09-23 | End: 2020-09-25

## 2020-09-23 RX ORDER — FOLIC ACID 0.8 MG
1 TABLET ORAL DAILY
Refills: 0 | Status: DISCONTINUED | OUTPATIENT
Start: 2020-09-23 | End: 2020-09-27

## 2020-09-23 RX ORDER — INFLUENZA VIRUS VACCINE 15; 15; 15; 15 UG/.5ML; UG/.5ML; UG/.5ML; UG/.5ML
0.5 SUSPENSION INTRAMUSCULAR ONCE
Refills: 0 | Status: DISCONTINUED | OUTPATIENT
Start: 2020-09-23 | End: 2020-09-27

## 2020-09-23 RX ADMIN — MORPHINE SULFATE 4 MILLIGRAM(S): 50 CAPSULE, EXTENDED RELEASE ORAL at 12:26

## 2020-09-23 RX ADMIN — SODIUM CHLORIDE 125 MILLILITER(S): 9 INJECTION, SOLUTION INTRAVENOUS at 22:37

## 2020-09-23 RX ADMIN — ONDANSETRON 4 MILLIGRAM(S): 8 TABLET, FILM COATED ORAL at 12:26

## 2020-09-23 RX ADMIN — SODIUM CHLORIDE 125 MILLILITER(S): 9 INJECTION, SOLUTION INTRAVENOUS at 18:40

## 2020-09-23 RX ADMIN — MORPHINE SULFATE 2 MILLIGRAM(S): 50 CAPSULE, EXTENDED RELEASE ORAL at 22:30

## 2020-09-23 RX ADMIN — MORPHINE SULFATE 4 MILLIGRAM(S): 50 CAPSULE, EXTENDED RELEASE ORAL at 17:48

## 2020-09-23 RX ADMIN — SODIUM CHLORIDE 1000 MILLILITER(S): 9 INJECTION, SOLUTION INTRAVENOUS at 17:49

## 2020-09-23 RX ADMIN — MORPHINE SULFATE 2 MILLIGRAM(S): 50 CAPSULE, EXTENDED RELEASE ORAL at 17:48

## 2020-09-23 RX ADMIN — INSULIN HUMAN 12 UNIT(S)/HR: 100 INJECTION, SOLUTION SUBCUTANEOUS at 15:43

## 2020-09-23 RX ADMIN — MORPHINE SULFATE 4 MILLIGRAM(S): 50 CAPSULE, EXTENDED RELEASE ORAL at 15:04

## 2020-09-23 RX ADMIN — INSULIN HUMAN 12 UNIT(S)/HR: 100 INJECTION, SOLUTION SUBCUTANEOUS at 22:37

## 2020-09-23 RX ADMIN — MORPHINE SULFATE 4 MILLIGRAM(S): 50 CAPSULE, EXTENDED RELEASE ORAL at 15:42

## 2020-09-23 RX ADMIN — MORPHINE SULFATE 2 MILLIGRAM(S): 50 CAPSULE, EXTENDED RELEASE ORAL at 23:45

## 2020-09-23 RX ADMIN — SODIUM CHLORIDE 1000 MILLILITER(S): 9 INJECTION, SOLUTION INTRAVENOUS at 14:51

## 2020-09-23 RX ADMIN — LACTULOSE 10 GRAM(S): 10 SOLUTION ORAL at 15:42

## 2020-09-23 RX ADMIN — MORPHINE SULFATE 2 MILLIGRAM(S): 50 CAPSULE, EXTENDED RELEASE ORAL at 14:51

## 2020-09-23 NOTE — SBIRT NOTE ADULT - NSSBIRTALCPOSREINDET_GEN_A_CORE
Provided SBIRT services: Full screen Negative. Positive reinforcement provided given patient currently within healthy guidelines. Education materials reviewed and given to patient.

## 2020-09-23 NOTE — H&P ADULT - NSHPPHYSICALEXAM_GEN_ALL_CORE
T(C): 36.9 (09-23-20 @ 11:35), Max: 36.9 (09-23-20 @ 11:35)  HR: 82 (09-23-20 @ 14:52) (82 - 86)  BP: 149/87 (09-23-20 @ 14:52) (131/89 - 149/87)  RR: 18 (09-23-20 @ 14:52) (16 - 18)  SpO2: 97% (09-23-20 @ 14:52) (97% - 99%)    GENERAL APPEARANCE: Well developed, well nourished, alert and cooperative. NAD.   HEENT:  PERRL, EOMI. External auditory canals normal, hearing grossly intact.  NECK: Neck supple, non-tender without lymphadenopathy, masses or thyromegaly.  CARDIAC: Normal S1 and S2. No S3, S4 or murmurs. Rhythm is regular.  LUNGS: Clear to auscultation and percussion without rales, rhonchi, wheezing or diminished breath sounds.  ABDOMEN: Positive bowel sounds. Soft, nondistended, diffuse abdominal tenderness. No guarding or rebound.   EXTREMITIES: No significant deformity or joint abnormality. No edema. Peripheral pulses intact. No varicosities.  NEUROLOGICAL: CN II-XII intact. Strength and sensation symmetric and intact throughout.   SKIN: Skin normal color, texture and turgor with no lesions or eruptions.  PSYCHIATRIC: AOx3.Normal affect and behavior. T(C): 36.9 (09-23-20 @ 11:35), Max: 36.9 (09-23-20 @ 11:35)  HR: 82 (09-23-20 @ 14:52) (82 - 86)  BP: 149/87 (09-23-20 @ 14:52) (131/89 - 149/87)  RR: 18 (09-23-20 @ 14:52) (16 - 18)  SpO2: 97% (09-23-20 @ 14:52) (97% - 99%)    GENERAL APPEARANCE: Well developed, well nourished, alert and cooperative. NAD.   HEENT:  PERRL, EOMI. External auditory canals normal, hearing grossly intact.  NECK: Neck supple, non-tender without lymphadenopathy, masses or thyromegaly.  CARDIAC: Normal S1 and S2. No S3, S4. 2/6 Systolic murmur over RUSB. Rhythm is regular.  LUNGS: Clear to auscultation and percussion without rales, rhonchi, wheezing or diminished breath sounds.  ABDOMEN: Positive bowel sounds. Soft, nondistended, diffuse abdominal tenderness. No guarding or rebound.   EXTREMITIES: No significant deformity or joint abnormality. No edema. Peripheral pulses intact. No varicosities.  NEUROLOGICAL: CN II-XII intact. Strength and sensation symmetric and intact throughout.   SKIN: Skin normal color, texture and turgor with no lesions or eruptions.  PSYCHIATRIC: AOx3.Normal affect and behavior.

## 2020-09-23 NOTE — H&P ADULT - ASSESSMENT
ASSESSMENT  KEVIN GOMEZ is a 59yo M w/ [id number] (not on medication) p/w abdominal pain for 1 day a/w pancreatitis likely 2/2 hypertriglyceridemia.     PLAN  NEUROLOGIC:  No acute issues    SKIN:  Lines: 2 peripheral IVs  Decubiti: none    GI:  Diet: CC clear liquid     Pancreatitis:  - Starting insulin gtt  - Monitor BMPs q4hr  - FS q 1hr while on insulin  - C/w IVF  - Pain control    METABOLIC:  Hypertriglyceridemia  - Monitor TGs daily.   Endocrine abnormalities include hyperglycemia  CMP 09-23-20 @ 12:24    132<L>  |  93<L>  |  10  ----------------------------<  260<H>  4.0   |  27  |  0.53    Ca    8.7      09-23-20 @ 12:24    TPro  5.1<L>  /  Alb  4.3  /  TBili  0.5  /  DBili  x   /  AST  5   /  ALT  21  /  AlkPhos  59  09-23      Serum Cr trend: 0.53 <--     VOLUME ASSESSMENT:  No peripheral edema  No evidence of disturbance of effective circulating volume    HEMATOLOGIC:  No acute issues  CBC 09-23-20 @ 12:24                        15.1   11.48 )-----------( 162                   45.1       Hgb trend: 15.1 <--   WBC trend: 11.48 <--     PT/INR - ( 23 Sep 2020 12:24 )   PT: Test not performed SAMPLE IS GROSSLY LIPEMIC  TRY TO RUN ON ST4...REJECTED SEC;   INR: Test not performed         PTT - ( 23 Sep 2020 12:24 ):Test not performed NOTIFIED ,B  09/23/20 1354:  APTT previously reported as: Test not performed  SEC SEC    INFECTIOUS DISEASE:  No acute  issues    HEMODYNAMICS:  Patient is not on pressors    CARDIOVASCULAR:  No acute issues.     RESPIRATORY:  No acute issues    ETHICS:   No acute issues. ASSESSMENT  KEVIN GOMEZ is a 59yo M w/ [id number] (not on medication) p/w abdominal pain for 1 day a/w pancreatitis likely 2/2 hypertriglyceridemia.     PLAN  NEUROLOGIC:  No acute issues    SKIN:  Lines: 2 peripheral IVs  Decubiti: none    GI:  Diet: CC clear liquid     Pancreatitis:  - Starting insulin gtt  - Monitor BMPs q4hr  - FS q 1hr while on insulin  - C/w IVF  - Pain control    METABOLIC:  Hypertriglyceridemia  - Monitor TGs daily.   Endocrine abnormalities include hyperglycemia  CMP 09-23-20 @ 12:24    132<L>  |  93<L>  |  10  ----------------------------<  260<H>  4.0   |  27  |  0.53    Ca    8.7      09-23-20 @ 12:24    TPro  5.1<L>  /  Alb  4.3  /  TBili  0.5  /  DBili  x   /  AST  5   /  ALT  21  /  AlkPhos  59  09-23      Serum Cr trend: 0.53 <--     VOLUME ASSESSMENT:  No peripheral edema  No evidence of disturbance of effective circulating volume    HEMATOLOGIC:  No acute issues  CBC 09-23-20 @ 12:24                        15.1   11.48 )-----------( 162                   45.1       Hgb trend: 15.1 <--   WBC trend: 11.48 <--     PT/INR - ( 23 Sep 2020 12:24 )   PT: Test not performed SAMPLE IS GROSSLY LIPEMIC  TRY TO RUN ON ST4...REJECTED SEC;   INR: Test not performed         PTT - ( 23 Sep 2020 12:24 ):Test not performed NOTIFIED ,B  09/23/20 1354:  APTT previously reported as: Test not performed  SEC SEC    INFECTIOUS DISEASE:  No acute  issues    HEMODYNAMICS:  Patient is not on pressors    CARDIOVASCULAR:  New Systolic Murmur:  Systolic murmur heard on exam unknown to patient.  - TTE     RESPIRATORY:  No acute issues    ETHICS:   No acute issues.

## 2020-09-23 NOTE — ED PROVIDER NOTE - CLINICAL SUMMARY MEDICAL DECISION MAKING FREE TEXT BOX
Pt is a 59 y/o M smoker PMHx DM (not compliant with metformin) p/w abdominal pain x 2 hours -- possible pancreatitis, possible cholecystitis, r/o AAA -- labs, lipase, ua, ucx, CT angio abd and pelvis w/ iv contrast, RUQ sono

## 2020-09-23 NOTE — H&P ADULT - HISTORY OF PRESENT ILLNESS
Pt is a 59 yo M w/ [id number] (not on medication) p/w abdominal pain for 1 day. Pt reported that he was sitting at computer this morning at around 10 AM when he spontaenously experienced a 10/10 generalized abdominal pain (nonradiating). Pt reported that he only drank coffee in the AM. Pt reported no fevers, chills, CP, SOB, rash, HA. Pt reported associated nausea (no emesis), dizziness. Pt reported he has experience one similar episode of abdominal pain in the past, however the pain resolved after a bowel movement. Pt currently reporting constipation.     In the ED,  Vital Signs Last 24 Hrs  T(C): 36.9 (23 Sep 2020 11:35), Max: 36.9 (23 Sep 2020 11:35)  T(F): 98.5 (23 Sep 2020 11:35), Max: 98.5 (23 Sep 2020 11:35)  HR: 82 (23 Sep 2020 14:52) (82 - 86)  BP: 149/87 (23 Sep 2020 14:52) (131/89 - 149/87)  BP(mean): --  RR: 18 (23 Sep 2020 14:52) (16 - 18)  SpO2: 97% (23 Sep 2020 14:52) (97% - 99%)  Pt found to have triglyceridemia, elevated lipase. Currently pending CT A/P scan.  Pt treated w/ morphine, zofran, and LR bolus

## 2020-09-23 NOTE — H&P ADULT - NSHPSOCIALHISTORY_GEN_ALL_CORE
Pt current 1ppd smoker. Pt estimate 40 pack year history. Pt currently consumer 2-3 beers/day. Pt last drink 3 days ago. Pt reported no illicit drug use.

## 2020-09-23 NOTE — H&P ADULT - NSHPLABSRESULTS_GEN_ALL_CORE
15.1   11.48 )-----------( 162      ( 23 Sep 2020 12:24 )             45.1     Auto Eosinophil # 0.09  / Auto Eosinophil % 0.8   / Auto Neutrophil # 8.94  / Auto Neutrophil % 77.9  / BANDS % x        09-23    132<L>  |  93<L>  |  10  ----------------------------<  260<H>  4.0   |  27  |  0.53    Ca    8.7      23 Sep 2020 12:24  TPro  5.1<L>  /  Alb  4.3  /  TBili  0.5  /  DBili  x   /  AST  5   /  ALT  21  /  AlkPhos  59  09-23    PT/INR - ( 23 Sep 2020 12:24 )   PT: Test not performed SAMPLE IS GROSSLY LIPEMIC  TRY TO RUN ON ST4...REJECTED SEC;   INR: Test not performed         PTT - ( 23 Sep 2020 12:24 )  PTT:Test not performed NOTIFIED ,B  09/23/20 1354:  APTT previously reported as: Test not performed  SEC SEC     Lipase, Serum: 2891.4 U/L (09.23.20 @ 12:24)  Triglycerides, Serum: 3826 mg/dL (09.23.20 @ 12:24)

## 2020-09-23 NOTE — ED PROVIDER NOTE - PHYSICAL EXAMINATION
2+ radial and DP pulse bilaterally 2+ radial and DP pulse bilaterally  ATTENDING PHYSICAL EXAM  GEN - appears in obvious discomfort; A+O x3  HEAD - NC/AT; EYES/NOSE - PERRL, EOMI, mucous membranes moist, no discharge; THROAT: Oral cavity and pharynx normal. No inflammation, swelling, exudate, or lesions  NECK: Neck supple, non-tender without lymphadenopathy, no masses, no JVD  PULMONARY - CTA b/l, symmetric breath sounds  CARDIAC -s1s2, RRR, no M,R,G  ABDOMEN - +NABS, appears mildly distended, tympanic on left side of abdomen and dull to percussion on right side.  No pulsatile mass appreciated.  + tender throughout with guarding.  BACK - no CVA tenderness, No vertebral or paravertebral tenderness  EXTREMITIES - symmetric pulses, 2+ dp, capillary refill < 2 seconds, no clubbing, no cyanosis, no edema  SKIN - no rash or bruising   NEUROLOGIC - alert, CN 2-12 intact

## 2020-09-23 NOTE — ED ADULT NURSE NOTE - OBJECTIVE STATEMENT
60-year-old male presents to ED complaining of abdominal pain and constipation since 10am.  Pt not offering much history at this time, states he had an enema at home from his neighbor who's a nurse PTA with minimal relief.  Last BM today at 1030, liquid, yellow in color.  Pt appears uncomfortable in ED.

## 2020-09-23 NOTE — H&P ADULT - NSHPREVIEWOFSYSTEMS_GEN_ALL_CORE
CONSTITUTIONAL:  No weight loss, fever, chills, weakness or fatigue.  HEENT:  Eyes:  No visual loss, blurred vision, double vision or yellow sclerae. Ears, Nose, Throat:  No hearing loss, sneezing, congestion, runny nose or sore throat.  SKIN:  No rash or itching.  CARDIOVASCULAR:  No chest pain, chest pressure or chest discomfort. No palpitations.  RESPIRATORY:  No shortness of breath, cough or sputum.  GASTROINTESTINAL:  See HPI  GENITOURINARY:  Denies hematuria, dysuria.   NEUROLOGICAL:  See HPI  MUSCULOSKELETAL:  No muscle, back pain, joint pain or stiffness.  HEMATOLOGIC:  No anemia, bleeding or bruising.  LYMPHATICS:  No enlarged nodes.   PSYCHIATRIC:  No history of depression or anxiety.  ENDOCRINOLOGIC:  No reports of sweating, cold or heat intolerance. No polyuria or polydipsia.  ALLERGIES:  No history of asthma, hives, eczema or rhinitis.

## 2020-09-23 NOTE — ED PROVIDER NOTE - OBJECTIVE STATEMENT
Pt is a 59 y/o M smoker PMHx DM (not compliant with metformin) p/w abdominal pain x 2 hours.  Pt states 2 hours ago pt had urge to have a bowel movement.  Pt states he passed a small amount of stool after which pt developed 9/10 epigastric pain, aching radiating throughout whole abdomen associated with nausea.  Pt notes using an enema thereafter, with which pt passed some loose nonbloody stool.  Pt notes pain worsens with movement.  Pt denies any fevers, chills, vomiting, chest pain, SOB, jaw/arm/neck/back pain, numbness, weakness, dysuria, cloudy urine, flank pain, hematuria, melena, brbpr, illicit drug use, ETOH abuse or any other specific complaints. Pt is a 59 y/o M smoker PMHx DM (not compliant with metformin) p/w abdominal pain x 2 hours.  Pt states 2 hours ago pt had urge to have a bowel movement.  Pt states he passed a small amount of stool after which pt developed 9/10 epigastric pain, aching radiating throughout whole abdomen associated with nausea.  Pt notes using an enema thereafter, with which pt passed some loose nonbloody stool.  Pt notes pain worsens with movement.  Pt denies any fevers, chills, vomiting, chest pain, SOB, jaw/arm/neck/back pain, numbness, weakness, dysuria, cloudy urine, flank pain, hematuria, melena, brbpr, illicit drug use, ETOH abuse or any other specific complaints.  Attending - Agree with above.  I evaluated patient myself. 59 y/o M c/o acute onset of abd pain since approx 10am.  Feels tenesmus.  Reports few similar episodes in past, last approx 1 year ago.  In past has resolved after having BM.  Used enema with no relief.  Has not seen doctor for this in past.  Denies fever, cough, covid. Noted hx DM, noncompliant with metformin.

## 2020-09-23 NOTE — ED PROVIDER NOTE - PROGRESS NOTE DETAILS
KATHERINE ALLEN:  Lipase found to be 2891.  Triglycerides found to be 3826.  MICU consulted, they recommend insulin drip. KATHERINE ALLEN:  Lipase found to be 2891.  Triglycerides found to be 3826.  MICU consulted, they recommend insulin drip.  Sign out given to Dr. Vickers and KATHERINE Gar at this time. KATHERINE DUFF: spoke with MICU resident, accepted patient, states to admit to Dr. Memo Cortés. Pt admitted for pancreatitis secondary to hypertriglyceridemia.

## 2020-09-24 DIAGNOSIS — E11.65 TYPE 2 DIABETES MELLITUS WITH HYPERGLYCEMIA: ICD-10-CM

## 2020-09-24 DIAGNOSIS — E78.1 PURE HYPERGLYCERIDEMIA: ICD-10-CM

## 2020-09-24 DIAGNOSIS — K85.80 OTHER ACUTE PANCREATITIS WITHOUT NECROSIS OR INFECTION: ICD-10-CM

## 2020-09-24 LAB
ANION GAP SERPL CALC-SCNC: 10 MMO/L — SIGNIFICANT CHANGE UP (ref 7–14)
ANION GAP SERPL CALC-SCNC: 11 MMO/L — SIGNIFICANT CHANGE UP (ref 7–14)
ANION GAP SERPL CALC-SCNC: 12 MMO/L — SIGNIFICANT CHANGE UP (ref 7–14)
ANION GAP SERPL CALC-SCNC: 15 MMO/L — HIGH (ref 7–14)
APPEARANCE UR: CLEAR — SIGNIFICANT CHANGE UP
B-OH-BUTYR SERPL-SCNC: < 0 MMOL/L — LOW (ref 0–0.4)
BACTERIA # UR AUTO: NEGATIVE — SIGNIFICANT CHANGE UP
BASOPHILS # BLD AUTO: 0.05 K/UL — SIGNIFICANT CHANGE UP (ref 0–0.2)
BASOPHILS NFR BLD AUTO: 0.4 % — SIGNIFICANT CHANGE UP (ref 0–2)
BILIRUB UR-MCNC: NEGATIVE — SIGNIFICANT CHANGE UP
BLOOD UR QL VISUAL: HIGH
BUN SERPL-MCNC: 7 MG/DL — SIGNIFICANT CHANGE UP (ref 7–23)
CALCIUM SERPL-MCNC: 8.1 MG/DL — LOW (ref 8.4–10.5)
CALCIUM SERPL-MCNC: 8.2 MG/DL — LOW (ref 8.4–10.5)
CALCIUM SERPL-MCNC: 8.4 MG/DL — SIGNIFICANT CHANGE UP (ref 8.4–10.5)
CALCIUM SERPL-MCNC: 8.4 MG/DL — SIGNIFICANT CHANGE UP (ref 8.4–10.5)
CHLORIDE SERPL-SCNC: 96 MMOL/L — LOW (ref 98–107)
CHLORIDE SERPL-SCNC: 97 MMOL/L — LOW (ref 98–107)
CHLORIDE SERPL-SCNC: 98 MMOL/L — SIGNIFICANT CHANGE UP (ref 98–107)
CHLORIDE SERPL-SCNC: 99 MMOL/L — SIGNIFICANT CHANGE UP (ref 98–107)
CO2 SERPL-SCNC: 22 MMOL/L — SIGNIFICANT CHANGE UP (ref 22–31)
CO2 SERPL-SCNC: 23 MMOL/L — SIGNIFICANT CHANGE UP (ref 22–31)
CO2 SERPL-SCNC: 23 MMOL/L — SIGNIFICANT CHANGE UP (ref 22–31)
CO2 SERPL-SCNC: 24 MMOL/L — SIGNIFICANT CHANGE UP (ref 22–31)
CO2 SERPL-SCNC: 25 MMOL/L — SIGNIFICANT CHANGE UP (ref 22–31)
CO2 SERPL-SCNC: 26 MMOL/L — SIGNIFICANT CHANGE UP (ref 22–31)
COLOR SPEC: YELLOW — SIGNIFICANT CHANGE UP
CREAT SERPL-MCNC: 0.51 MG/DL — SIGNIFICANT CHANGE UP (ref 0.5–1.3)
CREAT SERPL-MCNC: 0.59 MG/DL — SIGNIFICANT CHANGE UP (ref 0.5–1.3)
CREAT SERPL-MCNC: 0.6 MG/DL — SIGNIFICANT CHANGE UP (ref 0.5–1.3)
CREAT SERPL-MCNC: 0.6 MG/DL — SIGNIFICANT CHANGE UP (ref 0.5–1.3)
CREAT SERPL-MCNC: 0.68 MG/DL — SIGNIFICANT CHANGE UP (ref 0.5–1.3)
CREAT SERPL-MCNC: 0.7 MG/DL — SIGNIFICANT CHANGE UP (ref 0.5–1.3)
EOSINOPHIL # BLD AUTO: 0.04 K/UL — SIGNIFICANT CHANGE UP (ref 0–0.5)
EOSINOPHIL NFR BLD AUTO: 0.3 % — SIGNIFICANT CHANGE UP (ref 0–6)
GLUCOSE BLDC GLUCOMTR-MCNC: 104 MG/DL — HIGH (ref 70–99)
GLUCOSE BLDC GLUCOMTR-MCNC: 104 MG/DL — HIGH (ref 70–99)
GLUCOSE BLDC GLUCOMTR-MCNC: 105 MG/DL — HIGH (ref 70–99)
GLUCOSE BLDC GLUCOMTR-MCNC: 105 MG/DL — HIGH (ref 70–99)
GLUCOSE BLDC GLUCOMTR-MCNC: 106 MG/DL — HIGH (ref 70–99)
GLUCOSE BLDC GLUCOMTR-MCNC: 113 MG/DL — HIGH (ref 70–99)
GLUCOSE BLDC GLUCOMTR-MCNC: 114 MG/DL — HIGH (ref 70–99)
GLUCOSE BLDC GLUCOMTR-MCNC: 114 MG/DL — HIGH (ref 70–99)
GLUCOSE BLDC GLUCOMTR-MCNC: 115 MG/DL — HIGH (ref 70–99)
GLUCOSE BLDC GLUCOMTR-MCNC: 117 MG/DL — HIGH (ref 70–99)
GLUCOSE BLDC GLUCOMTR-MCNC: 118 MG/DL — HIGH (ref 70–99)
GLUCOSE BLDC GLUCOMTR-MCNC: 121 MG/DL — HIGH (ref 70–99)
GLUCOSE BLDC GLUCOMTR-MCNC: 122 MG/DL — HIGH (ref 70–99)
GLUCOSE BLDC GLUCOMTR-MCNC: 127 MG/DL — HIGH (ref 70–99)
GLUCOSE BLDC GLUCOMTR-MCNC: 133 MG/DL — HIGH (ref 70–99)
GLUCOSE BLDC GLUCOMTR-MCNC: 133 MG/DL — HIGH (ref 70–99)
GLUCOSE BLDC GLUCOMTR-MCNC: 136 MG/DL — HIGH (ref 70–99)
GLUCOSE BLDC GLUCOMTR-MCNC: 139 MG/DL — HIGH (ref 70–99)
GLUCOSE BLDC GLUCOMTR-MCNC: 142 MG/DL — HIGH (ref 70–99)
GLUCOSE BLDC GLUCOMTR-MCNC: 150 MG/DL — HIGH (ref 70–99)
GLUCOSE BLDC GLUCOMTR-MCNC: 157 MG/DL — HIGH (ref 70–99)
GLUCOSE SERPL-MCNC: 106 MG/DL — HIGH (ref 70–99)
GLUCOSE SERPL-MCNC: 110 MG/DL — HIGH (ref 70–99)
GLUCOSE SERPL-MCNC: 115 MG/DL — HIGH (ref 70–99)
GLUCOSE SERPL-MCNC: 144 MG/DL — HIGH (ref 70–99)
GLUCOSE UR-MCNC: 1000 — HIGH
HBA1C BLD-MCNC: 11.4 % — HIGH (ref 4–5.6)
HBA1C BLD-MCNC: 11.8 % — HIGH (ref 4–5.6)
HCT VFR BLD CALC: 41.1 % — SIGNIFICANT CHANGE UP (ref 39–50)
HCV AB S/CO SERPL IA: 0.14 S/CO — SIGNIFICANT CHANGE UP (ref 0–0.99)
HCV AB SERPL-IMP: SIGNIFICANT CHANGE UP
HGB BLD-MCNC: 15 G/DL — SIGNIFICANT CHANGE UP (ref 13–17)
HYALINE CASTS # UR AUTO: SIGNIFICANT CHANGE UP
IMM GRANULOCYTES NFR BLD AUTO: 0.5 % — SIGNIFICANT CHANGE UP (ref 0–1.5)
KETONES UR-MCNC: SIGNIFICANT CHANGE UP
LEUKOCYTE ESTERASE UR-ACNC: NEGATIVE — SIGNIFICANT CHANGE UP
LYMPHOCYTES # BLD AUTO: 1.32 K/UL — SIGNIFICANT CHANGE UP (ref 1–3.3)
LYMPHOCYTES # BLD AUTO: 11.1 % — LOW (ref 13–44)
MAGNESIUM SERPL-MCNC: 1.6 MG/DL — SIGNIFICANT CHANGE UP (ref 1.6–2.6)
MAGNESIUM SERPL-MCNC: 1.6 MG/DL — SIGNIFICANT CHANGE UP (ref 1.6–2.6)
MAGNESIUM SERPL-MCNC: 1.8 MG/DL — SIGNIFICANT CHANGE UP (ref 1.6–2.6)
MAGNESIUM SERPL-MCNC: 1.8 MG/DL — SIGNIFICANT CHANGE UP (ref 1.6–2.6)
MAGNESIUM SERPL-MCNC: 1.9 MG/DL — SIGNIFICANT CHANGE UP (ref 1.6–2.6)
MAGNESIUM SERPL-MCNC: 2 MG/DL — SIGNIFICANT CHANGE UP (ref 1.6–2.6)
MCHC RBC-ENTMCNC: 30.9 PG — SIGNIFICANT CHANGE UP (ref 27–34)
MCHC RBC-ENTMCNC: 36.5 % — HIGH (ref 32–36)
MCV RBC AUTO: 84.6 FL — SIGNIFICANT CHANGE UP (ref 80–100)
MONOCYTES # BLD AUTO: 0.89 K/UL — SIGNIFICANT CHANGE UP (ref 0–0.9)
MONOCYTES NFR BLD AUTO: 7.5 % — SIGNIFICANT CHANGE UP (ref 2–14)
NEUTROPHILS # BLD AUTO: 9.51 K/UL — HIGH (ref 1.8–7.4)
NEUTROPHILS NFR BLD AUTO: 80.2 % — HIGH (ref 43–77)
NITRITE UR-MCNC: NEGATIVE — SIGNIFICANT CHANGE UP
NRBC # FLD: 0 K/UL — SIGNIFICANT CHANGE UP (ref 0–0)
PH UR: 6 — SIGNIFICANT CHANGE UP (ref 5–8)
PHOSPHATE SERPL-MCNC: 2 MG/DL — LOW (ref 2.5–4.5)
PHOSPHATE SERPL-MCNC: 2.2 MG/DL — LOW (ref 2.5–4.5)
PHOSPHATE SERPL-MCNC: 2.5 MG/DL — SIGNIFICANT CHANGE UP (ref 2.5–4.5)
PHOSPHATE SERPL-MCNC: 2.8 MG/DL — SIGNIFICANT CHANGE UP (ref 2.5–4.5)
PHOSPHATE SERPL-MCNC: SIGNIFICANT CHANGE UP MG/DL (ref 2.5–4.5)
PHOSPHATE SERPL-MCNC: SIGNIFICANT CHANGE UP MG/DL (ref 2.5–4.5)
PLATELET # BLD AUTO: 255 K/UL — SIGNIFICANT CHANGE UP (ref 150–400)
PMV BLD: 12.1 FL — SIGNIFICANT CHANGE UP (ref 7–13)
POTASSIUM SERPL-MCNC: 3.4 MMOL/L — LOW (ref 3.5–5.3)
POTASSIUM SERPL-MCNC: 3.7 MMOL/L — SIGNIFICANT CHANGE UP (ref 3.5–5.3)
POTASSIUM SERPL-MCNC: 3.8 MMOL/L — SIGNIFICANT CHANGE UP (ref 3.5–5.3)
POTASSIUM SERPL-MCNC: 4 MMOL/L — SIGNIFICANT CHANGE UP (ref 3.5–5.3)
POTASSIUM SERPL-MCNC: SIGNIFICANT CHANGE UP MMOL/L (ref 3.5–5.3)
POTASSIUM SERPL-MCNC: SIGNIFICANT CHANGE UP MMOL/L (ref 3.5–5.3)
POTASSIUM SERPL-SCNC: 3.4 MMOL/L — LOW (ref 3.5–5.3)
POTASSIUM SERPL-SCNC: 3.7 MMOL/L — SIGNIFICANT CHANGE UP (ref 3.5–5.3)
POTASSIUM SERPL-SCNC: 3.8 MMOL/L — SIGNIFICANT CHANGE UP (ref 3.5–5.3)
POTASSIUM SERPL-SCNC: 4 MMOL/L — SIGNIFICANT CHANGE UP (ref 3.5–5.3)
POTASSIUM SERPL-SCNC: SIGNIFICANT CHANGE UP MMOL/L (ref 3.5–5.3)
POTASSIUM SERPL-SCNC: SIGNIFICANT CHANGE UP MMOL/L (ref 3.5–5.3)
PROT UR-MCNC: 200 — HIGH
RBC # BLD: 4.86 M/UL — SIGNIFICANT CHANGE UP (ref 4.2–5.8)
RBC # FLD: 12.9 % — SIGNIFICANT CHANGE UP (ref 10.3–14.5)
RBC CASTS # UR COMP ASSIST: HIGH (ref 0–?)
RH IG SCN BLD-IMP: POSITIVE — SIGNIFICANT CHANGE UP
SARS-COV-2 RNA SPEC QL NAA+PROBE: SIGNIFICANT CHANGE UP
SODIUM SERPL-SCNC: 131 MMOL/L — LOW (ref 135–145)
SODIUM SERPL-SCNC: 132 MMOL/L — LOW (ref 135–145)
SODIUM SERPL-SCNC: 134 MMOL/L — LOW (ref 135–145)
SODIUM SERPL-SCNC: 134 MMOL/L — LOW (ref 135–145)
SODIUM SERPL-SCNC: 135 MMOL/L — SIGNIFICANT CHANGE UP (ref 135–145)
SODIUM SERPL-SCNC: 135 MMOL/L — SIGNIFICANT CHANGE UP (ref 135–145)
SP GR SPEC: > 1.04 — HIGH (ref 1–1.04)
SQUAMOUS # UR AUTO: SIGNIFICANT CHANGE UP
TRIGL SERPL-MCNC: 1067 MG/DL — HIGH (ref 10–149)
TRIGL SERPL-MCNC: 1843 MG/DL — HIGH (ref 10–149)
UROBILINOGEN FLD QL: NORMAL — SIGNIFICANT CHANGE UP
WBC # BLD: 11.87 K/UL — HIGH (ref 3.8–10.5)
WBC # FLD AUTO: 11.87 K/UL — HIGH (ref 3.8–10.5)
WBC UR QL: SIGNIFICANT CHANGE UP (ref 0–?)

## 2020-09-24 PROCEDURE — 99222 1ST HOSP IP/OBS MODERATE 55: CPT

## 2020-09-24 PROCEDURE — 99233 SBSQ HOSP IP/OBS HIGH 50: CPT | Mod: GC

## 2020-09-24 PROCEDURE — 99291 CRITICAL CARE FIRST HOUR: CPT

## 2020-09-24 PROCEDURE — 74174 CTA ABD&PLVS W/CONTRAST: CPT | Mod: 26

## 2020-09-24 RX ORDER — THIAMINE MONONITRATE (VIT B1) 100 MG
100 TABLET ORAL DAILY
Refills: 0 | Status: DISCONTINUED | OUTPATIENT
Start: 2020-09-24 | End: 2020-09-27

## 2020-09-24 RX ORDER — OMEGA-3 ACID ETHYL ESTERS 1 G
2 CAPSULE ORAL
Refills: 0 | Status: DISCONTINUED | OUTPATIENT
Start: 2020-09-24 | End: 2020-09-27

## 2020-09-24 RX ORDER — ACETAMINOPHEN 500 MG
650 TABLET ORAL ONCE
Refills: 0 | Status: COMPLETED | OUTPATIENT
Start: 2020-09-24 | End: 2020-09-24

## 2020-09-24 RX ORDER — FENOFIBRATE,MICRONIZED 130 MG
145 CAPSULE ORAL ONCE
Refills: 0 | Status: COMPLETED | OUTPATIENT
Start: 2020-09-24 | End: 2020-09-24

## 2020-09-24 RX ORDER — MORPHINE SULFATE 50 MG/1
2 CAPSULE, EXTENDED RELEASE ORAL ONCE
Refills: 0 | Status: DISCONTINUED | OUTPATIENT
Start: 2020-09-24 | End: 2020-09-24

## 2020-09-24 RX ORDER — ENOXAPARIN SODIUM 100 MG/ML
40 INJECTION SUBCUTANEOUS DAILY
Refills: 0 | Status: DISCONTINUED | OUTPATIENT
Start: 2020-09-24 | End: 2020-09-27

## 2020-09-24 RX ORDER — OXYCODONE HYDROCHLORIDE 5 MG/1
5 TABLET ORAL EVERY 4 HOURS
Refills: 0 | Status: DISCONTINUED | OUTPATIENT
Start: 2020-09-24 | End: 2020-09-27

## 2020-09-24 RX ORDER — POTASSIUM CHLORIDE 20 MEQ
20 PACKET (EA) ORAL ONCE
Refills: 0 | Status: COMPLETED | OUTPATIENT
Start: 2020-09-24 | End: 2020-09-24

## 2020-09-24 RX ORDER — ATORVASTATIN CALCIUM 80 MG/1
20 TABLET, FILM COATED ORAL AT BEDTIME
Refills: 0 | Status: DISCONTINUED | OUTPATIENT
Start: 2020-09-24 | End: 2020-09-26

## 2020-09-24 RX ORDER — FUROSEMIDE 40 MG
40 TABLET ORAL ONCE
Refills: 0 | Status: DISCONTINUED | OUTPATIENT
Start: 2020-09-24 | End: 2020-09-24

## 2020-09-24 RX ORDER — POTASSIUM CHLORIDE 20 MEQ
40 PACKET (EA) ORAL ONCE
Refills: 0 | Status: COMPLETED | OUTPATIENT
Start: 2020-09-24 | End: 2020-09-24

## 2020-09-24 RX ORDER — ASPIRIN/CALCIUM CARB/MAGNESIUM 324 MG
81 TABLET ORAL DAILY
Refills: 0 | Status: DISCONTINUED | OUTPATIENT
Start: 2020-09-24 | End: 2020-09-27

## 2020-09-24 RX ORDER — POLYETHYLENE GLYCOL 3350 17 G/17G
17 POWDER, FOR SOLUTION ORAL ONCE
Refills: 0 | Status: COMPLETED | OUTPATIENT
Start: 2020-09-24 | End: 2020-09-24

## 2020-09-24 RX ORDER — MAGNESIUM SULFATE 500 MG/ML
1 VIAL (ML) INJECTION ONCE
Refills: 0 | Status: COMPLETED | OUTPATIENT
Start: 2020-09-24 | End: 2020-09-24

## 2020-09-24 RX ADMIN — MORPHINE SULFATE 2 MILLIGRAM(S): 50 CAPSULE, EXTENDED RELEASE ORAL at 05:05

## 2020-09-24 RX ADMIN — Medication 1 MILLIGRAM(S): at 12:31

## 2020-09-24 RX ADMIN — Medication 145 MILLIGRAM(S): at 18:48

## 2020-09-24 RX ADMIN — Medication 1 TABLET(S): at 12:31

## 2020-09-24 RX ADMIN — INSULIN HUMAN 6 UNIT(S)/HR: 100 INJECTION, SOLUTION SUBCUTANEOUS at 19:49

## 2020-09-24 RX ADMIN — ENOXAPARIN SODIUM 40 MILLIGRAM(S): 100 INJECTION SUBCUTANEOUS at 12:31

## 2020-09-24 RX ADMIN — Medication 100 GRAM(S): at 03:45

## 2020-09-24 RX ADMIN — SODIUM CHLORIDE 125 MILLILITER(S): 9 INJECTION, SOLUTION INTRAVENOUS at 19:50

## 2020-09-24 RX ADMIN — Medication 2 GRAM(S): at 18:48

## 2020-09-24 RX ADMIN — Medication 100 MILLIGRAM(S): at 12:31

## 2020-09-24 RX ADMIN — SODIUM CHLORIDE 125 MILLILITER(S): 9 INJECTION, SOLUTION INTRAVENOUS at 03:45

## 2020-09-24 RX ADMIN — Medication 650 MILLIGRAM(S): at 16:21

## 2020-09-24 RX ADMIN — POLYETHYLENE GLYCOL 3350 17 GRAM(S): 17 POWDER, FOR SOLUTION ORAL at 19:49

## 2020-09-24 RX ADMIN — INSULIN HUMAN 6 UNIT(S)/HR: 100 INJECTION, SOLUTION SUBCUTANEOUS at 08:03

## 2020-09-24 RX ADMIN — INSULIN HUMAN 10 UNIT(S)/HR: 100 INJECTION, SOLUTION SUBCUTANEOUS at 03:45

## 2020-09-24 RX ADMIN — ATORVASTATIN CALCIUM 20 MILLIGRAM(S): 80 TABLET, FILM COATED ORAL at 21:40

## 2020-09-24 RX ADMIN — Medication 40 MILLIEQUIVALENT(S): at 08:02

## 2020-09-24 RX ADMIN — Medication 650 MILLIGRAM(S): at 03:45

## 2020-09-24 RX ADMIN — Medication 20 MILLIEQUIVALENT(S): at 10:50

## 2020-09-24 RX ADMIN — Medication 81 MILLIGRAM(S): at 18:48

## 2020-09-24 RX ADMIN — Medication 5 MILLIGRAM(S): at 21:40

## 2020-09-24 RX ADMIN — MORPHINE SULFATE 2 MILLIGRAM(S): 50 CAPSULE, EXTENDED RELEASE ORAL at 04:35

## 2020-09-24 RX ADMIN — Medication 650 MILLIGRAM(S): at 16:51

## 2020-09-24 RX ADMIN — Medication 650 MILLIGRAM(S): at 04:15

## 2020-09-24 RX ADMIN — SODIUM CHLORIDE 125 MILLILITER(S): 9 INJECTION, SOLUTION INTRAVENOUS at 08:03

## 2020-09-24 NOTE — PROGRESS NOTE ADULT - SUBJECTIVE AND OBJECTIVE BOX
CHIEF COMPLAINT:    Interval Events:    REVIEW OF SYSTEMS:  Constitutional: [ ] negative [ ] fevers [ ] chills [ ] weight loss [ ] weight gain  HEENT: [ ] negative [ ] dry eyes [ ] eye irritation [ ] postnasal drip [ ] nasal congestion  CV: [ ] negative  [ ] chest pain [ ] orthopnea [ ] palpitations [ ] murmur  Resp: [ ] negative [ ] cough [ ] shortness of breath [ ] dyspnea [ ] wheezing [ ] sputum [ ] hemoptysis  GI: [ ] negative [ ] nausea [ ] vomiting [ ] diarrhea [ ] constipation [ ] abd pain [ ] dysphagia   : [ ] negative [ ] dysuria [ ] nocturia [ ] hematuria [ ] increased urinary frequency  Musculoskeletal: [ ] negative [ ] back pain [ ] myalgias [ ] arthralgias [ ] fracture  Skin: [ ] negative [ ] rash [ ] itch  Neurological: [ ] negative [ ] headache [ ] dizziness [ ] syncope [ ] weakness [ ] numbness  Psychiatric: [ ] negative [ ] anxiety [ ] depression  Endocrine: [ ] negative [ ] diabetes [ ] thyroid problem  Hematologic/Lymphatic: [ ] negative [ ] anemia [ ] bleeding problem  Allergic/Immunologic: [ ] negative [ ] itchy eyes [ ] nasal discharge [ ] hives [ ] angioedema  [ ] All other systems negative  [ ] Unable to assess ROS because ________    OBJECTIVE:  ICU Vital Signs Last 24 Hrs  T(C): 37.2 (24 Sep 2020 08:00), Max: 38.1 (24 Sep 2020 03:00)  T(F): 98.9 (24 Sep 2020 08:00), Max: 100.5 (24 Sep 2020 03:00)  HR: 84 (24 Sep 2020 11:00) (82 - 97)  BP: 114/83 (24 Sep 2020 09:00) (107/74 - 149/89)  BP(mean): 93 (24 Sep 2020 09:00) (79 - 93)  ABP: --  ABP(mean): --  RR: 17 (24 Sep 2020 11:00) (11 - 27)  SpO2: 94% (24 Sep 2020 11:00) (89% - 100%)        09-23 @ 07:01  -  09-24 @ 07:00  --------------------------------------------------------  IN: 1452 mL / OUT: 550 mL / NET: 902 mL    09-24 @ 07:01  -  09-24 @ 11:09  --------------------------------------------------------  IN: 250 mL / OUT: 0 mL / NET: 250 mL      CAPILLARY BLOOD GLUCOSE      POCT Blood Glucose.: 127 mg/dL (24 Sep 2020 10:03)      PHYSICAL EXAM:  General:   HEENT:   Lymph Nodes:  Neck:   Respiratory:   Cardiovascular:   Abdomen:   Extremities:   Skin:   Neurological:  Psychiatry:    LINES:    HOSPITAL MEDICATIONS:  Standing Meds:  chlorhexidine 4% Liquid 1 Application(s) Topical <User Schedule>  dextrose 5% + sodium chloride 0.45%. 1000 milliLiter(s) IV Continuous <Continuous>  enoxaparin Injectable 40 milliGRAM(s) SubCutaneous daily  folic acid 1 milliGRAM(s) Oral daily  influenza   Vaccine 0.5 milliLiter(s) IntraMuscular once  insulin regular Infusion 6 Unit(s)/Hr IV Continuous <Continuous>  multivitamin 1 Tablet(s) Oral daily  thiamine 100 milliGRAM(s) Oral daily      PRN Meds:  oxyCODONE    IR 5 milliGRAM(s) Oral every 4 hours PRN      LABS:                        15.0   11.87 )-----------( 255      ( 24 Sep 2020 01:00 )             41.1     Hgb Trend: 15.0<--, 15.1<--  09-24    135  |  98  |  7   ----------------------------<  110<H>  3.4<L>   |  22  |  0.60    Ca    8.4      24 Sep 2020 06:25  Phos  2.8     09-24  Mg     2.0     09-24    TPro  5.8<L>  /  Alb  4.1  /  TBili  0.7  /  DBili  x   /  AST  45<H>  /  ALT  21  /  AlkPhos  47  09-23    Creatinine Trend: 0.60<--, 0.59<--, 0.51<--, 0.52<--, 0.53<--  PT/INR - ( 23 Sep 2020 12:24 )   PT: Test not performed SAMPLE IS GROSSLY LIPEMIC  TRY TO RUN ON ST4...REJECTED SEC;   INR: Test not performed         PTT - ( 23 Sep 2020 12:24 )  PTT:Test not performed NOTIFIED GARY TIRADO  09/23/20 1354:  APTT previously reported as: Test not performed  SEC SEC  Urinalysis Basic - ( 24 Sep 2020 03:20 )    Color: YELLOW / Appearance: CLEAR / SG: > 1.040 / pH: 6.0  Gluc: 1000 / Ketone: TRACE  / Bili: NEGATIVE / Urobili: NORMAL   Blood: MODERATE / Protein: 200 / Nitrite: NEGATIVE   Leuk Esterase: NEGATIVE / RBC: 6-10 / WBC 3-5   Sq Epi: FEW / Non Sq Epi: x / Bacteria: NEGATIVE        Venous Blood Gas:  09-23 @ 19:46  7.43/35/54/24/91.0  VBG Lactate: 1.5  Venous Blood Gas:  09-23 @ 14:48  7.31/48/26/21/42.9  VBG Lactate: 1.8      MICROBIOLOGY:     RADIOLOGY:  [ ] Reviewed and interpreted by me    EKG: Sari Guardado MD  Emergency Medicine PGY-1    CHIEF COMPLAINT: 61 y/o M w/ [id number] (not adherent to Metformin) p/w abdominal pain for 1 day a/w pancreatitis likely 2/2 hypertriglyceridemia, 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    Interval Events:  Overnight patient spiked fever to 100.5. Given Tylenol and 2mg morphine for pain. Blood and urine cultures sent.   Patient remains on 2L NC, saturating mid to high 90s.  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 Vascular surgery consulted.       REVIEW OF SYSTEMS:  CONST: no fevers, no chills  EYES: no pain, no vision changes  ENT: no sore throat, no ear pain, no change in hearing  CV: no chest pain, no leg swelling  RESP: no shortness of breath, no cough  ABD: + abdominal pain, no nausea, no vomiting, no diarrhea  : no dysuria, no flank pain, no hematuria  MSK: no back pain, no extremity pain  NEURO: no headache or additional neurologic complaints  HEME: no easy bleeding  SKIN:  no rash     OBJECTIVE:  ICU Vital Signs Last 24 Hrs  T(C): 37.2 (24 Sep 2020 08:00), Max: 38.1 (24 Sep 2020 03:00)  T(F): 98.9 (24 Sep 2020 08:00), Max: 100.5 (24 Sep 2020 03:00)  HR: 84 (24 Sep 2020 11:00) (82 - 97)  BP: 114/83 (24 Sep 2020 09:00) (107/74 - 149/89)  BP(mean): 93 (24 Sep 2020 09:00) (79 - 93)  ABP: --  ABP(mean): --  RR: 17 (24 Sep 2020 11:00) (11 - 27)  SpO2: 94% (24 Sep 2020 11:00) (89% - 100%)        09-23 @ 07:01  -  09-24 @ 07:00  --------------------------------------------------------  IN: 1452 mL / OUT: 550 mL / NET: 902 mL    09-24 @ 07:01  -  09-24 @ 11:09  --------------------------------------------------------  IN: 250 mL / OUT: 0 mL / NET: 250 mL      CAPILLARY BLOOD GLUCOSE      POCT Blood Glucose.: 127 mg/dL (24 Sep 2020 10:03)      PHYSICAL EXAM:  GENERAL: Awake, alert, NAD  HEENT: NC/AT, moist mucous membranes, PERRL, EOMI  LUNGS: CTAB, no wheezes or crackles   CARDIAC: RRR, normal S1/S2. Systolic murmur.  ABDOMEN: RUQ TTP. Soft, normal BS, non distended, no rebound, no guarding  EXT: No edema, no calf tenderness, 2+ DP pulses bilaterally, no deformities.  NEURO: A&Ox3. Moving all extremities. CN II-XII grossly intact.   SKIN: Warm and dry. No rash.  PSYCH: Normal affect.     LINES:    HOSPITAL MEDICATIONS:  Standing Meds:  chlorhexidine 4% Liquid 1 Application(s) Topical <User Schedule>  dextrose 5% + sodium chloride 0.45%. 1000 milliLiter(s) IV Continuous <Continuous>  enoxaparin Injectable 40 milliGRAM(s) SubCutaneous daily  folic acid 1 milliGRAM(s) Oral daily  influenza   Vaccine 0.5 milliLiter(s) IntraMuscular once  insulin regular Infusion 6 Unit(s)/Hr IV Continuous <Continuous>  multivitamin 1 Tablet(s) Oral daily  thiamine 100 milliGRAM(s) Oral daily      PRN Meds:  oxyCODONE    IR 5 milliGRAM(s) Oral every 4 hours PRN      LABS:                        15.0   11.87 )-----------( 255      ( 24 Sep 2020 01:00 )             41.1     Hgb Trend: 15.0<--, 15.1<--  09-24    135  |  98  |  7   ----------------------------<  110<H>  3.4<L>   |  22  |  0.60    Ca    8.4      24 Sep 2020 06:25  Phos  2.8     09-24  Mg     2.0     09-24    TPro  5.8<L>  /  Alb  4.1  /  TBili  0.7  /  DBili  x   /  AST  45<H>  /  ALT  21  /  AlkPhos  47  09-23    Creatinine Trend: 0.60<--, 0.59<--, 0.51<--, 0.52<--, 0.53<--  PT/INR - ( 23 Sep 2020 12:24 )   PT: Test not performed SAMPLE IS GROSSLY LIPEMIC  TRY TO RUN ON ST4...REJECTED SEC;   INR: Test not performed         PTT - ( 23 Sep 2020 12:24 )  PTT:Test not performed NOTIFIED ,B  09/23/20 1354:  APTT previously reported as: Test not performed  SEC SEC  Urinalysis Basic - ( 24 Sep 2020 03:20 )    Color: YELLOW / Appearance: CLEAR / SG: > 1.040 / pH: 6.0  Gluc: 1000 / Ketone: TRACE  / Bili: NEGATIVE / Urobili: NORMAL   Blood: MODERATE / Protein: 200 / Nitrite: NEGATIVE   Leuk Esterase: NEGATIVE / RBC: 6-10 / WBC 3-5   Sq Epi: FEW / Non Sq Epi: x / Bacteria: NEGATIVE        Venous Blood Gas:  09-23 @ 19:46  7.43/35/54/24/91.0  VBG Lactate: 1.5  Venous Blood Gas:  09-23 @ 14:48  7.31/48/26/21/42.9  VBG Lactate: 1.8      MICROBIOLOGY:   -Blood and urine cultures pending    RADIOLOGY:  CT A/P 9/23 - Acute interstitial edematous pancreatitis. No evidence of peripancreatic fluid collection or necrosis. 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  Abdominal US 9/23 - Hepatic steatosis.      EKG:

## 2020-09-24 NOTE — PROGRESS NOTE ADULT - ASSESSMENT
ASSESSMENT  KEVIN GOMEZ is a 61yo Male with PMH *** presenting with ***.    PLAN  NEUROLOGIC:  Alteration of mental status present. Likely due to ____ (structural (bleed or stroke), encephalitis, meningitis, non convulsive status epilepticus, metabolic cause (endogenous vs exogenous)  Eligible for early mobilization and immediate physical therapy?    SKIN:  Lines:  Decubiti:    GI:  Diet:  GI stress prophylaxis     METABOLIC:  BMP showing evidence of   Liver functions tests show   Endocrine abnormalities include  CMP 09-24-20 @ 06:25    135  |  98  |  7   ----------------------------<  110<H>  3.4<L>   |  22  |  0.60    Ca    8.4      09-24-20 @ 06:25  Phos  2.8     09-24  Mg     2.0     09-24    TPro  5.8<L>  /  Alb  4.1  /  TBili  0.7  /  DBili  x   /  AST  45<H>  /  ALT  21  /  AlkPhos  47  09-23      Serum Cr trend: 0.60 <-- , 0.59 <-- , 0.51 <-- , 0.52 <-- , 0.53 <--     VOLUME ASSESSMENT:  No peripheral edema  No evidence of disturbance of effective circulating volume    HEMATOLOGIC:  CBC results show  Coag panel shows  DVT prophylaxis with   CBC 09-24-20 @ 01:00                        15.0   11.87 )-----------( 255                   41.1       Hgb trend: 15.0 <-- , 15.1 <--   WBC trend: 11.87 <-- , 11.48 <--     PT/INR - ( 23 Sep 2020 12:24 )   PT: Test not performed SAMPLE IS GROSSLY LIPEMIC  TRY TO RUN ON ST4...REJECTED SEC;   INR: Test not performed         PTT - ( 23 Sep 2020 12:24 ):Test not performed NOTIFIED ,B  09/23/20 1354:  APTT previously reported as: Test not performed  SEC SEC    INFECTIOUS DISEASE:  Pt without strong objective or clinical evidence of infection. 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    PLAN  NEUROLOGIC:  -A&Ox3   -No active issues    SKIN:  Lines: 2 peripheral IVs  Decubiti: none    GI:  #Pancreatitis  -Pain control with oxycodone  -Clear liquid diet  -TGs downtrending 1067 <-- 1843 <-- 2494 <-- 3826  -Q12 TG levels  -C/w IVF, D5 1/2NS  -Endocrine consulted    #Celiac artery dissection  -Vascular surgery consulted  -F/u recommendations      METABOLIC:  #Diabetes  -Glucose levels stable in low 100s  -On insulin drip 6units  -No anion gap  -A1C 11.8  -Potassium repleted  -Endocrine consulted   -Q4 BMPs, POCT glucose q1      CMP 09-24-20 @ 06:25    135  |  98  |  7   ----------------------------<  110<H>  3.4<L>   |  22  |  0.60    Ca    8.4      09-24-20 @ 06:25  Phos  2.8     09-24  Mg     2.0     09-24    TPro  5.8<L>  /  Alb  4.1  /  TBili  0.7  /  DBili  x   /  AST  45<H>  /  ALT  21  /  AlkPhos  47  09-23      Serum Cr trend: 0.60 <-- , 0.59 <-- , 0.51 <-- , 0.52 <-- , 0.53 <--     VOLUME ASSESSMENT:  -No peripheral edema  -No evidence of disturbance of effective circulating volume  -Adequate urine output  -C/w IVF    HEMATOLOGIC:  -No active issues  -DVT prophylaxis with enaxoparin     CBC 09-24-20 @ 01:00                        15.0   11.87 )-----------( 255                   41.1       Hgb trend: 15.0 <-- , 15.1 <--   WBC trend: 11.87 <-- , 11.48 <--     PT/INR - ( 23 Sep 2020 12:24 )   PT: Test not performed SAMPLE IS GROSSLY LIPEMIC  TRY TO RUN ON ST4...REJECTED SEC;   INR: Test not performed         PTT - ( 23 Sep 2020 12:24 ):Test not performed NOTIFIED ,GARY  09/23/20 1354:  APTT previously reported as: Test not performed  SEC SEC    INFECTIOUS DISEASE:  Pt without strong objective or clinical evidence of infection. 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    PLAN  NEUROLOGIC:  -A&Ox3   -No active issues    SKIN:  Lines: 2 peripheral IVs  Decubiti: none    GI:  #Pancreatitis  -Pain control with oxycodone  -Clear liquid diet  -TGs downtrending 1067 <-- 1843 <-- 2494 <-- 3826  -Q12 TG levels  -C/w IVF, D5 1/2NS  -Endocrine consulted    #Celiac artery dissection  -Vascular surgery consulted  -F/u recommendations      METABOLIC:  #Diabetes  -Glucose levels stable in low 100s  -On insulin drip 6units  -No anion gap  -A1C 11.8  -Potassium repleted  -Endocrine consulted   -Q4 BMPs, POCT glucose q1      CMP 09-24-20 @ 06:25    135  |  98  |  7   ----------------------------<  110<H>  3.4<L>   |  22  |  0.60    Ca    8.4      09-24-20 @ 06:25  Phos  2.8     09-24  Mg     2.0     09-24    TPro  5.8<L>  /  Alb  4.1  /  TBili  0.7  /  DBili  x   /  AST  45<H>  /  ALT  21  /  AlkPhos  47  09-23      Serum Cr trend: 0.60 <-- , 0.59 <-- , 0.51 <-- , 0.52 <-- , 0.53 <--     VOLUME ASSESSMENT:  -No peripheral edema  -No evidence of disturbance of effective circulating volume  -Adequate urine output  -C/w IVF    HEMATOLOGIC:  -No active issues  -DVT prophylaxis with enaxoparin     CBC 09-24-20 @ 01:00                        15.0   11.87 )-----------( 255                   41.1       Hgb trend: 15.0 <-- , 15.1 <--   WBC trend: 11.87 <-- , 11.48 <--     PT/INR - ( 23 Sep 2020 12:24 )   PT: Test not performed SAMPLE IS GROSSLY LIPEMIC  TRY TO RUN ON ST4...REJECTED SEC;   INR: Test not performed         PTT - ( 23 Sep 2020 12:24 ):Test not performed NOTIFIED ,B  09/23/20 1354:  APTT previously reported as: Test not performed  SEC SEC    INFECTIOUS DISEASE:  -F/u urine and blood clx    HEMODYNAMICS:  -Stable  -Patient not on pressors    CARDIOVASCULAR:  -New systolic murmur  -F/u TTE    RESPIRATORY:  -Saturating high 90s on 2L NC    ETHICS:   -No active issues

## 2020-09-24 NOTE — CONSULT NOTE ADULT - ATTENDING COMMENTS
Uncontrolled DM2, severe hypertriglyceridemia induced pancreatitis. Insulin drip for now until trig < 500 then transition. Start lipid lowering agents as tolerated. Insulin pen teaching, RD consult.   DC on basal plus bolus pens vs. basal plus orals.  Patient is high risk and high level decision making, requiring ICU level of care.    Herminio Mack MD  Division of Endocrinology  Pager: [phone]    If after 6PM or before 9AM, or on weekends/holidays, please call endocrine answering service for assistance ([phone]).  For nonurgent matters email [email or web address] for assistance.

## 2020-09-24 NOTE — CONSULT NOTE ADULT - ASSESSMENT
60M w/ pmh DM (not on medication) presenting with abdominal pain 2/2 pancreatitis. CT demonstrating celiac artery dissection.     - Pending further discussion with vascular fellow    Vascular Surgery (C Team Surgery)  [phone] with any questions   60M w/ pmh DM (not on medication) presenting with abdominal pain 2/2 pancreatitis. CT demonstrating celiac artery dissection.     - Recommend starting Aspirin if OK with medicine team  - Please obtain CTA of abdomen/pelvis  - VAscular will follow  - Discussed with vascular fellow      Vascular Surgery (C Team Surgery)  [id number] with any questions

## 2020-09-24 NOTE — CONSULT NOTE ADULT - SUBJECTIVE AND OBJECTIVE BOX
HPI:  59 yo M with PMHx of Type2 Diabetes and HLD, not on any medications presenting with abdominal pain. Patient found to have pancreatitis in the setting of elevated triglyceride levels, now on insulin gtt.      Endocrine History:  Patient reports having a history of Type 2 Diabetes and hypertriglyceridemia however states that he is not on any medications. He last saw a PCP over 2 years ago. He reports that he just didn't want to start medications, despite being advised by his PCP. Patient does not check his sugars at home. Also reports a very poor diet consisting of a lot of bread and rice. No known neuropathy or retinopathy; however, has not seen an ophthalmologist.     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 650 milliGRAM(s) Oral once  chlorhexidine 4% Liquid 1 Application(s) Topical <User Schedule>  dextrose 5% + sodium chloride 0.45%. 1000 milliLiter(s) (125 mL/Hr) IV Continuous <Continuous>  enoxaparin Injectable 40 milliGRAM(s) SubCutaneous daily  folic acid 1 milliGRAM(s) Oral daily  influenza   Vaccine 0.5 milliLiter(s) IntraMuscular once  insulin regular Infusion 6 Unit(s)/Hr (6 mL/Hr) IV Continuous <Continuous>  multivitamin 1 Tablet(s) Oral daily  thiamine 100 milliGRAM(s) Oral daily    MEDICATIONS  (PRN):  oxyCODONE    IR 5 milliGRAM(s) Oral every 4 hours PRN Severe Pain (7 - 10)    Allergies  penicillin (Unknown)    Review of Systems:  Constitutional: No fever  Eyes: No blurry vision  Neuro: No tremors  HEENT: No pain  Cardiovascular: No chest pain, palpitations  Respiratory: No SOB, no cough  GI: + nausea, vomiting, and abdominal pain  : No dysuria  Endocrine: no polyuria, polydipsia  Hem/lymph: no swelling    ALL OTHER SYSTEMS REVIEWED AND NEGATIVE    PHYSICAL EXAM:  VITALS: T(C): 38.2 (09-24-20 @ 16:00)  T(F): 100.7 (09-24-20 @ 16:00), Max: 100.7 (09-24-20 @ 16:00)  HR: 91 (09-24-20 @ 16:00) (83 - 115)  BP: 133/90 (09-24-20 @ 16:00) (107/74 - 139/84)  RR:  (11 - 27)  SpO2:  (89% - 98%)  Wt(kg): 103  GENERAL: mild distress, well-developed  EYES: No proptosis, anicteric  HEENT:  Atraumatic, Normocephalic, moist mucous membranes  THYROID: Normal size, no palpable nodules  RESPIRATORY: Clear to auscultation bilaterally; No rales, rhonchi, wheezing  CARDIOVASCULAR: Regular rate and rhythm; no peripheral edema  GI: Soft, +BS, tender to palpation  SKIN: Dry, intact, No rashes or lesions  PSYCH: Alert and oriented x 3, normal affect, normal mood    POCT Blood Glucose.: 117 mg/dL (09-24-20 @ 15:07)  POCT Blood Glucose.: 127 mg/dL (09-24-20 @ 14:08)  POCT Blood Glucose.: 142 mg/dL (09-24-20 @ 12:09)  POCT Blood Glucose.: 136 mg/dL (09-24-20 @ 11:17)  POCT Blood Glucose.: 127 mg/dL (09-24-20 @ 10:03)  POCT Blood Glucose.: 122 mg/dL (09-24-20 @ 08:57)  POCT Blood Glucose.: 118 mg/dL (09-24-20 @ 08:01)  POCT Blood Glucose.: 106 mg/dL (09-24-20 @ 07:01)  POCT Blood Glucose.: 114 mg/dL (09-24-20 @ 06:23)  POCT Blood Glucose.: 133 mg/dL (09-24-20 @ 05:01)  POCT Blood Glucose.: 150 mg/dL (09-24-20 @ 04:01)  POCT Blood Glucose.: 127 mg/dL (09-24-20 @ 03:17)  POCT Blood Glucose.: 133 mg/dL (09-24-20 @ 02:01)  POCT Blood Glucose.: 139 mg/dL (09-24-20 @ 01:02)  POCT Blood Glucose.: 157 mg/dL (09-24-20 @ 00:05)  POCT Blood Glucose.: 198 mg/dL (09-23-20 @ 23:07)  POCT Blood Glucose.: 172 mg/dL (09-23-20 @ 22:10)  POCT Blood Glucose.: 195 mg/dL (09-23-20 @ 21:01)  POCT Blood Glucose.: 183 mg/dL (09-23-20 @ 20:29)  POCT Blood Glucose.: 214 mg/dL (09-23-20 @ 19:59)  POCT Blood Glucose.: 209 mg/dL (09-23-20 @ 19:30)  POCT Blood Glucose.: 217 mg/dL (09-23-20 @ 18:56)  POCT Blood Glucose.: 237 mg/dL (09-23-20 @ 18:32)  POCT Blood Glucose.: 224 mg/dL (09-23-20 @ 18:01)  POCT Blood Glucose.: 230 mg/dL (09-23-20 @ 17:32)  POCT Blood Glucose.: 258 mg/dL (09-23-20 @ 16:57)  POCT Blood Glucose.: 262 mg/dL (09-23-20 @ 16:31)  POCT Blood Glucose.: 238 mg/dL (09-23-20 @ 16:03)  POCT Blood Glucose.: 230 mg/dL (09-23-20 @ 15:29)                            15.0   11.87 )-----------( 255      ( 24 Sep 2020 01:00 )             41.1       09-24    134<L>  |  98  |  7   ----------------------------<  144<H>  4.0   |  24  |  0.60    EGFR if : 128  EGFR if non : 110    Ca    8.1<L>      09-24  Mg     1.9     09-24  Phos  2.5     09-24    TPro  5.8<L>  /  Alb  4.1  /  TBili  0.7  /  DBili  x   /  AST  45<H>  /  ALT  21  /  AlkPhos  47  09-23      Thyroid Function Tests:        09-24 Chol -- LDL -- HDL -- Trig 1067<H>, 09-24 Chol -- LDL -- HDL -- Trig 1843<H>, 09-23 Chol -- LDL -- HDL -- Trig 2494<H>, 09-23 Chol 559<H> LDL 27 HDL 17<L> Trig 3826<H>    CT Abdomen and Pelvis w/ IV Cont  IMPRESSION:  Acute interstitial edematous pancreatitis. No evidence of peripancreatic fluid collection or necrosis. 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          Radiology:              HPI:  61 yo M with PMHx of Type2 Diabetes and HLD, not on any medications presenting with abdominal pain. Patient found to have pancreatitis in the setting of elevated triglyceride levels, now on insulin gtt.      Endocrine History:  Patient reports having a history of Type 2 Diabetes and hypertriglyceridemia however states that he is not on any medications. He last saw a PCP over 2 years ago. He reports that he just didn't want to start medications, despite being advised by his PCP. Patient does not check his sugars at home. Also reports a very poor diet consisting of a lot of bread and rice. No known neuropathy or retinopathy; however, has not seen an ophthalmologist.     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 650 milliGRAM(s) Oral once  chlorhexidine 4% Liquid 1 Application(s) Topical <User Schedule>  dextrose 5% + sodium chloride 0.45%. 1000 milliLiter(s) (125 mL/Hr) IV Continuous <Continuous>  enoxaparin Injectable 40 milliGRAM(s) SubCutaneous daily  folic acid 1 milliGRAM(s) Oral daily  influenza   Vaccine 0.5 milliLiter(s) IntraMuscular once  insulin regular Infusion 6 Unit(s)/Hr (6 mL/Hr) IV Continuous <Continuous>  multivitamin 1 Tablet(s) Oral daily  thiamine 100 milliGRAM(s) Oral daily    MEDICATIONS  (PRN):  oxyCODONE    IR 5 milliGRAM(s) Oral every 4 hours PRN Severe Pain (7 - 10)    Allergies  penicillin (Unknown)    Review of Systems:  Constitutional: No fever  Eyes: No blurry vision  Neuro: No tremors  HEENT: No pain  Cardiovascular: No chest pain, palpitations  Respiratory: No SOB, no cough  GI: + nausea, vomiting, and abdominal pain  : No dysuria  Endocrine: no polyuria, polydipsia  Hem/lymph: no swelling    ALL OTHER SYSTEMS REVIEWED AND NEGATIVE    PHYSICAL EXAM:  VITALS: T(C): 38.2 (09-24-20 @ 16:00)  T(F): 100.7 (09-24-20 @ 16:00), Max: 100.7 (09-24-20 @ 16:00)  HR: 91 (09-24-20 @ 16:00) (83 - 115)  BP: 133/90 (09-24-20 @ 16:00) (107/74 - 139/84)  RR:  (11 - 27)  SpO2:  (89% - 98%)  Wt(kg): 103  GENERAL: mild distress, well-developed  EYES: No proptosis, anicteric  HEENT:  Atraumatic, Normocephalic, moist mucous membranes  THYROID: Normal size, no palpable nodules  RESPIRATORY: Clear to auscultation bilaterally; No rales, rhonchi, wheezing  CARDIOVASCULAR: Regular rate and rhythm; no peripheral edema  GI: Soft, +BS, tender to palpation  SKIN: Dry, intact, No rashes or lesions  PSYCH: Alert and oriented x 3, normal affect, normal mood    POCT Blood Glucose.: 117 mg/dL (09-24-20 @ 15:07)  POCT Blood Glucose.: 127 mg/dL (09-24-20 @ 14:08)  POCT Blood Glucose.: 142 mg/dL (09-24-20 @ 12:09)  POCT Blood Glucose.: 136 mg/dL (09-24-20 @ 11:17)  POCT Blood Glucose.: 127 mg/dL (09-24-20 @ 10:03)  POCT Blood Glucose.: 122 mg/dL (09-24-20 @ 08:57)  POCT Blood Glucose.: 118 mg/dL (09-24-20 @ 08:01)  POCT Blood Glucose.: 106 mg/dL (09-24-20 @ 07:01)  POCT Blood Glucose.: 114 mg/dL (09-24-20 @ 06:23)  POCT Blood Glucose.: 133 mg/dL (09-24-20 @ 05:01)  POCT Blood Glucose.: 150 mg/dL (09-24-20 @ 04:01)  POCT Blood Glucose.: 127 mg/dL (09-24-20 @ 03:17)  POCT Blood Glucose.: 133 mg/dL (09-24-20 @ 02:01)  POCT Blood Glucose.: 139 mg/dL (09-24-20 @ 01:02)  POCT Blood Glucose.: 157 mg/dL (09-24-20 @ 00:05)  POCT Blood Glucose.: 198 mg/dL (09-23-20 @ 23:07)  POCT Blood Glucose.: 172 mg/dL (09-23-20 @ 22:10)  POCT Blood Glucose.: 195 mg/dL (09-23-20 @ 21:01)  POCT Blood Glucose.: 183 mg/dL (09-23-20 @ 20:29)  POCT Blood Glucose.: 214 mg/dL (09-23-20 @ 19:59)  POCT Blood Glucose.: 209 mg/dL (09-23-20 @ 19:30)  POCT Blood Glucose.: 217 mg/dL (09-23-20 @ 18:56)  POCT Blood Glucose.: 237 mg/dL (09-23-20 @ 18:32)  POCT Blood Glucose.: 224 mg/dL (09-23-20 @ 18:01)  POCT Blood Glucose.: 230 mg/dL (09-23-20 @ 17:32)  POCT Blood Glucose.: 258 mg/dL (09-23-20 @ 16:57)  POCT Blood Glucose.: 262 mg/dL (09-23-20 @ 16:31)  POCT Blood Glucose.: 238 mg/dL (09-23-20 @ 16:03)  POCT Blood Glucose.: 230 mg/dL (09-23-20 @ 15:29)                            15.0   11.87 )-----------( 255      ( 24 Sep 2020 01:00 )             41.1       09-24    134<L>  |  98  |  7   ----------------------------<  144<H>  4.0   |  24  |  0.60    EGFR if : 128  EGFR if non : 110    Ca    8.1<L>      09-24  Mg     1.9     09-24  Phos  2.5     09-24    TPro  5.8<L>  /  Alb  4.1  /  TBili  0.7  /  DBili  x   /  AST  45<H>  /  ALT  21  /  AlkPhos  47  09-23      HbA1c 11.8      09-24 Chol -- LDL -- HDL -- Trig 1067<H>, 09-24 Chol -- LDL -- HDL -- Trig 1843<H>, 09-23 Chol -- LDL -- HDL -- Trig 2494<H>, 09-23 Chol 559<H> LDL 27 HDL 17<L> Trig 3826<H>    CT Abdomen and Pelvis w/ IV Cont  IMPRESSION:  Acute interstitial edematous pancreatitis. No evidence of peripancreatic fluid collection or necrosis. 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

## 2020-09-24 NOTE — CONSULT NOTE ADULT - SUBJECTIVE AND OBJECTIVE BOX
[facility] General Surgery Consultation     Patient is a 60y old Male who presents with a chief complaint of Pancreatitis 2/2 hypertriglyceridemia (24 Sep 2020 11:08)      HPI:  60M w/ pmh DM (not on medication) presenting with abdominal pain. Pt reported that he was sitting at computer yesterday morning at around 10 AM when he spontaneously experienced a 10/10 generalized abdominal pain (nonradiating). Pt reported that he only drank coffee in the AM. Pt reported no fevers, chills, CP, SOB, rash, HA. Pt reported associated nausea (no emesis), dizziness. Pt reported he has experience one similar episode of abdominal pain in the past, however the pain resolved after a bowel movement. Pt currently reporting constipation. Patient is active smoker with 40 pack year history and drinks 2-3 drinks 2-3x per week. Patient admitted to MICU for insulin gtt in setting of hypertriglyceridemia causing pancreatitis and hyperglycemia. CT demonstrating celiac artery dissection. Vascular consulted for evaluation.      In the ED,  Vital Signs Last 24 Hrs  T(C): 36.9 (23 Sep 2020 11:35), Max: 36.9 (23 Sep 2020 11:35)  T(F): 98.5 (23 Sep 2020 11:35), Max: 98.5 (23 Sep 2020 11:35)  HR: 82 (23 Sep 2020 14:52) (82 - 86)  BP: 149/87 (23 Sep 2020 14:52) (131/89 - 149/87)  BP(mean): --  RR: 18 (23 Sep 2020 14:52) (16 - 18)  SpO2: 97% (23 Sep 2020 14:52) (97% - 99%)  Pt found to have triglyceridemia, elevated lipase. Currently pending CT A/P scan.  Pt treated w/ morphine, zofran, and LR bolus  (23 Sep 2020 15:23)      PAST MEDICAL & SURGICAL HISTORY:  Diabetes mellitus    No significant past surgical history        FAMILY HISTORY:  FH: type 2 diabetes        SOCIAL HISTORY:    MEDICATIONS  (STANDING):  chlorhexidine 4% Liquid 1 Application(s) Topical <User Schedule>  dextrose 5% + sodium chloride 0.45%. 1000 milliLiter(s) (125 mL/Hr) IV Continuous <Continuous>  enoxaparin Injectable 40 milliGRAM(s) SubCutaneous daily  folic acid 1 milliGRAM(s) Oral daily  influenza   Vaccine 0.5 milliLiter(s) IntraMuscular once  insulin regular Infusion 6 Unit(s)/Hr (6 mL/Hr) IV Continuous <Continuous>  multivitamin 1 Tablet(s) Oral daily  thiamine 100 milliGRAM(s) Oral daily    MEDICATIONS  (PRN):  oxyCODONE    IR 5 milliGRAM(s) Oral every 4 hours PRN Severe Pain (7 - 10)    Allergies    penicillin (Unknown)    Intolerances        Vital Signs Last 24 Hrs  T(C): 37.2 (24 Sep 2020 08:00), Max: 38.1 (24 Sep 2020 03:00)  T(F): 98.9 (24 Sep 2020 08:00), Max: 100.5 (24 Sep 2020 03:00)  HR: 84 (24 Sep 2020 11:00) (82 - 97)  BP: 123/90 (24 Sep 2020 11:00) (107/74 - 149/89)  BP(mean): 91 (24 Sep 2020 11:00) (79 - 94)  RR: 17 (24 Sep 2020 11:00) (11 - 27)  SpO2: 94% (24 Sep 2020 11:00) (89% - 100%)  Daily Height in cm: 177.8 (23 Sep 2020 22:10)    Daily     General: NAD, well-nourished  HEENT: Atraumatic, EOMI  Resp: Breathing comfortably on RA  CV: Normal sinus rhythm  Abd: soft, mildly distended, mildly tender in epigastrium, no RT/guarding  Ext: ROMIx4, motor strength intact x 4                          15.0   11.87 )-----------( 255      ( 24 Sep 2020 01:00 )             41.1     09-24    135  |  98  |  7   ----------------------------<  110<H>  3.4<L>   |  22  |  0.60    Ca    8.4      24 Sep 2020 06:25  Phos  2.8     09-24  Mg     2.0     09-24    TPro  5.8<L>  /  Alb  4.1  /  TBili  0.7  /  DBili  x   /  AST  45<H>  /  ALT  21  /  AlkPhos  47  09-23    PT/INR - ( 23 Sep 2020 12:24 )   PT: Test not performed SAMPLE IS GROSSLY LIPEMIC  TRY TO RUN ON ST4...REJECTED SEC;   INR: Test not performed         PTT - ( 23 Sep 2020 12:24 )  PTT:Test not performed NOTIFIED GARY TIRADO  09/23/20 1354:  APTT previously reported as: Test not performed  SEC SEC  Urinalysis Basic - ( 24 Sep 2020 03:20 )    Color: YELLOW / Appearance: CLEAR / SG: > 1.040 / pH: 6.0  Gluc: 1000 / Ketone: TRACE  / Bili: NEGATIVE / Urobili: NORMAL   Blood: MODERATE / Protein: 200 / Nitrite: NEGATIVE   Leuk Esterase: NEGATIVE / RBC: 6-10 / WBC 3-5   Sq Epi: FEW / Non Sq Epi: x / Bacteria: NEGATIVE        Radiographic Findings:       Assessment:     IMPRESSION:  Acute interstitial edematous pancreatitis. No evidence of peripancreatic fluid collection or necrosis. 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    These findings were discussed with Dr. Bates at 9/24/2020 10:19 AM by Dr. Tinajero with read back confirmation.            TING TINAJERO M.D., RADIOLOGYRESIDENT  This document has been electronically signed.  DAMIAN NGUYỄN M.D. ATTENDING RADIOLOGIST  This document has been electronically signed. Sep 24 2020 11:03AM

## 2020-09-24 NOTE — CONSULT NOTE ADULT - ASSESSMENT
59 yo M with PMHx of Type2 Diabetes and HLD, not on any medications presenting with abdominal pain. Patient found to have pancreatitis in the setting of elevated triglyceride levels, now on insulin gtt.      Problem 1: Hypertriglyceridemia      Problem 2: Type 2 Diabetes      59 yo M with PMHx of Type2 Diabetes and HLD, not on any medications presenting with abdominal pain. Patient found to have pancreatitis in the setting of elevated triglyceride levels, now on insulin gtt.      Problem 1: Hypertriglyceridemia c/b pancreatitis  - Continue insulin gtt until TG <500  - Start Atorvastatin 20mg nightly  - Start Fenofibrate 145 mg daily  - Start Lovaza 2g BID    Problem 2: Type 2 Diabetes (HbA1c 11.8)  - Patient currently on insulin gtt for hypertriglyceridemia  - When patient is ready to come off of gtt, will calculate amount of insulin required on gtt and transition to basal/bolus regimen  - Nutrition consult  - Consistent carb diet when patient is transitioned to a diet    Discharge plan:  - Patient should be discharged on basal/bolus or basal + oral agents-- TBD  - Patient should f/u with Endocrine at:    [facility]   [address] [place] [address] [phone]    Discussed with Primary Team    Nery Reed M.D  Endocrine Fellow  Pager #[phone]       61 yo M with PMHx of Type2 Diabetes and HLD, not on any medications presenting with abdominal pain. Patient found to have pancreatitis in the setting of elevated triglyceride levels, now on insulin gtt.      Problem 1: Hypertriglyceridemia c/b pancreatitis  - Continue insulin gtt until TG <500  - Start Atorvastatin 20mg nightly  - Start Fenofibrate 145 mg daily  - Start Lovaza 2g BID    Problem 2: Type 2 Diabetes (HbA1c 11.8)  - Patient currently on insulin gtt for hypertriglyceridemia  - When patient is ready to come off of gtt, will calculate amount of insulin required on gtt and transition to basal/bolus regimen  - Nutrition consult  - Consistent carb diet when patient is transitioned to a diet    Discharge plan:  - Patient should be discharged on basal/bolus (pens) or basal + oral agents-- TBD  - Patient should f/u with Endocrine at:    [facility]   [address] [place] [address] [phone]    Discussed with Primary Team    Nery Reed M.D  Endocrine Fellow  Pager #[phone]       61 yo M with PMHx of Type2 Diabetes and HLD, not on any medications presenting with abdominal pain. Patient found to have pancreatitis in the setting of elevated triglyceride levels, now on insulin gtt.      Problem 1: Hypertriglyceridemia c/b pancreatitis  - Continue insulin gtt until TG <500  - Start Atorvastatin 20mg nightly  - Start Fenofibrate 145 mg daily  - Start Lovaza 2g BID    Problem 2: Type 2 Diabetes (HbA1c 11.8)  - Patient currently on insulin gtt for hypertriglyceridemia  - When patient is ready to come off of gtt, will calculate amount of insulin required on gtt and transition to basal/bolus regimen  - Nutrition consult  - Consistent carb diet when patient is transitioned to a diet    Discharge plan:  - Patient should be discharged on basal/bolus (pens) or basal + oral agents-- TBD  - Patient should f/u with Endocrine at:    [facility]   [address] [place] [address]  [phone]    Discussed with Primary Team    Nery Reed M.D  Endocrine Fellow  Pager #[phone]

## 2020-09-25 LAB
ANION GAP SERPL CALC-SCNC: 13 MMO/L — SIGNIFICANT CHANGE UP (ref 7–14)
ANION GAP SERPL CALC-SCNC: 14 MMO/L — SIGNIFICANT CHANGE UP (ref 7–14)
ANION GAP SERPL CALC-SCNC: 14 MMO/L — SIGNIFICANT CHANGE UP (ref 7–14)
BASOPHILS # BLD AUTO: 0.05 K/UL — SIGNIFICANT CHANGE UP (ref 0–0.2)
BASOPHILS NFR BLD AUTO: 0.4 % — SIGNIFICANT CHANGE UP (ref 0–2)
BASOPHILS NFR SPEC: 0.9 % — SIGNIFICANT CHANGE UP (ref 0–2)
BLASTS # FLD: 0 % — SIGNIFICANT CHANGE UP (ref 0–0)
BUN SERPL-MCNC: 7 MG/DL — SIGNIFICANT CHANGE UP (ref 7–23)
BUN SERPL-MCNC: 7 MG/DL — SIGNIFICANT CHANGE UP (ref 7–23)
BUN SERPL-MCNC: 8 MG/DL — SIGNIFICANT CHANGE UP (ref 7–23)
CALCIUM SERPL-MCNC: 8.1 MG/DL — LOW (ref 8.4–10.5)
CALCIUM SERPL-MCNC: 8.2 MG/DL — LOW (ref 8.4–10.5)
CALCIUM SERPL-MCNC: 8.4 MG/DL — SIGNIFICANT CHANGE UP (ref 8.4–10.5)
CHLORIDE SERPL-SCNC: 98 MMOL/L — SIGNIFICANT CHANGE UP (ref 98–107)
CHLORIDE SERPL-SCNC: 99 MMOL/L — SIGNIFICANT CHANGE UP (ref 98–107)
CHLORIDE SERPL-SCNC: 99 MMOL/L — SIGNIFICANT CHANGE UP (ref 98–107)
CO2 SERPL-SCNC: 21 MMOL/L — LOW (ref 22–31)
CO2 SERPL-SCNC: 21 MMOL/L — LOW (ref 22–31)
CO2 SERPL-SCNC: 22 MMOL/L — SIGNIFICANT CHANGE UP (ref 22–31)
CREAT SERPL-MCNC: 0.73 MG/DL — SIGNIFICANT CHANGE UP (ref 0.5–1.3)
CREAT SERPL-MCNC: 0.74 MG/DL — SIGNIFICANT CHANGE UP (ref 0.5–1.3)
CREAT SERPL-MCNC: 0.78 MG/DL — SIGNIFICANT CHANGE UP (ref 0.5–1.3)
CULTURE RESULTS: NO GROWTH — SIGNIFICANT CHANGE UP
EOSINOPHIL # BLD AUTO: 0.09 K/UL — SIGNIFICANT CHANGE UP (ref 0–0.5)
EOSINOPHIL NFR BLD AUTO: 0.8 % — SIGNIFICANT CHANGE UP (ref 0–6)
EOSINOPHIL NFR FLD: 0 % — SIGNIFICANT CHANGE UP (ref 0–6)
GIANT PLATELETS BLD QL SMEAR: PRESENT — SIGNIFICANT CHANGE UP
GLUCOSE BLDC GLUCOMTR-MCNC: 111 MG/DL — HIGH (ref 70–99)
GLUCOSE BLDC GLUCOMTR-MCNC: 113 MG/DL — HIGH (ref 70–99)
GLUCOSE BLDC GLUCOMTR-MCNC: 117 MG/DL — HIGH (ref 70–99)
GLUCOSE BLDC GLUCOMTR-MCNC: 136 MG/DL — HIGH (ref 70–99)
GLUCOSE BLDC GLUCOMTR-MCNC: 141 MG/DL — HIGH (ref 70–99)
GLUCOSE BLDC GLUCOMTR-MCNC: 155 MG/DL — HIGH (ref 70–99)
GLUCOSE BLDC GLUCOMTR-MCNC: 160 MG/DL — HIGH (ref 70–99)
GLUCOSE BLDC GLUCOMTR-MCNC: 161 MG/DL — HIGH (ref 70–99)
GLUCOSE BLDC GLUCOMTR-MCNC: 162 MG/DL — HIGH (ref 70–99)
GLUCOSE BLDC GLUCOMTR-MCNC: 197 MG/DL — HIGH (ref 70–99)
GLUCOSE BLDC GLUCOMTR-MCNC: 229 MG/DL — HIGH (ref 70–99)
GLUCOSE BLDC GLUCOMTR-MCNC: 85 MG/DL — SIGNIFICANT CHANGE UP (ref 70–99)
GLUCOSE BLDC GLUCOMTR-MCNC: 93 MG/DL — SIGNIFICANT CHANGE UP (ref 70–99)
GLUCOSE BLDC GLUCOMTR-MCNC: 98 MG/DL — SIGNIFICANT CHANGE UP (ref 70–99)
GLUCOSE SERPL-MCNC: 111 MG/DL — HIGH (ref 70–99)
GLUCOSE SERPL-MCNC: 175 MG/DL — HIGH (ref 70–99)
GLUCOSE SERPL-MCNC: 90 MG/DL — SIGNIFICANT CHANGE UP (ref 70–99)
HCT VFR BLD CALC: 43.3 % — SIGNIFICANT CHANGE UP (ref 39–50)
HGB BLD-MCNC: 14.2 G/DL — SIGNIFICANT CHANGE UP (ref 13–17)
IMM GRANULOCYTES NFR BLD AUTO: 0.4 % — SIGNIFICANT CHANGE UP (ref 0–1.5)
LYMPHOCYTES # BLD AUTO: 19.9 % — SIGNIFICANT CHANGE UP (ref 13–44)
LYMPHOCYTES # BLD AUTO: 2.31 K/UL — SIGNIFICANT CHANGE UP (ref 1–3.3)
LYMPHOCYTES NFR SPEC AUTO: 11.2 % — LOW (ref 13–44)
MAGNESIUM SERPL-MCNC: 1.7 MG/DL — SIGNIFICANT CHANGE UP (ref 1.6–2.6)
MAGNESIUM SERPL-MCNC: 1.8 MG/DL — SIGNIFICANT CHANGE UP (ref 1.6–2.6)
MAGNESIUM SERPL-MCNC: 1.8 MG/DL — SIGNIFICANT CHANGE UP (ref 1.6–2.6)
MCHC RBC-ENTMCNC: 28.8 PG — SIGNIFICANT CHANGE UP (ref 27–34)
MCHC RBC-ENTMCNC: 32.8 % — SIGNIFICANT CHANGE UP (ref 32–36)
MCV RBC AUTO: 87.8 FL — SIGNIFICANT CHANGE UP (ref 80–100)
METAMYELOCYTES # FLD: 0.9 % — SIGNIFICANT CHANGE UP (ref 0–1)
MONOCYTES # BLD AUTO: 1.02 K/UL — HIGH (ref 0–0.9)
MONOCYTES NFR BLD AUTO: 8.8 % — SIGNIFICANT CHANGE UP (ref 2–14)
MONOCYTES NFR BLD: 7.5 % — SIGNIFICANT CHANGE UP (ref 2–9)
MYELOCYTES NFR BLD: 0 % — SIGNIFICANT CHANGE UP (ref 0–0)
NEUTROPHIL AB SER-ACNC: 73.8 % — SIGNIFICANT CHANGE UP (ref 43–77)
NEUTROPHILS # BLD AUTO: 8.11 K/UL — HIGH (ref 1.8–7.4)
NEUTROPHILS NFR BLD AUTO: 69.7 % — SIGNIFICANT CHANGE UP (ref 43–77)
NEUTS BAND # BLD: 1.9 % — SIGNIFICANT CHANGE UP (ref 0–6)
NRBC # BLD: 1 /100WBC — SIGNIFICANT CHANGE UP
NRBC # FLD: 0 K/UL — SIGNIFICANT CHANGE UP (ref 0–0)
OTHER - HEMATOLOGY %: 0 — SIGNIFICANT CHANGE UP
PHOSPHATE SERPL-MCNC: 1.8 MG/DL — LOW (ref 2.5–4.5)
PHOSPHATE SERPL-MCNC: 1.8 MG/DL — LOW (ref 2.5–4.5)
PHOSPHATE SERPL-MCNC: 2.9 MG/DL — SIGNIFICANT CHANGE UP (ref 2.5–4.5)
PLATELET # BLD AUTO: 123 K/UL — LOW (ref 150–400)
PLATELET COUNT - ESTIMATE: SIGNIFICANT CHANGE UP
PMV BLD: 11.3 FL — SIGNIFICANT CHANGE UP (ref 7–13)
POIKILOCYTOSIS BLD QL AUTO: SLIGHT — SIGNIFICANT CHANGE UP
POTASSIUM SERPL-MCNC: 3.3 MMOL/L — LOW (ref 3.5–5.3)
POTASSIUM SERPL-MCNC: 3.6 MMOL/L — SIGNIFICANT CHANGE UP (ref 3.5–5.3)
POTASSIUM SERPL-MCNC: 3.7 MMOL/L — SIGNIFICANT CHANGE UP (ref 3.5–5.3)
POTASSIUM SERPL-SCNC: 3.3 MMOL/L — LOW (ref 3.5–5.3)
POTASSIUM SERPL-SCNC: 3.6 MMOL/L — SIGNIFICANT CHANGE UP (ref 3.5–5.3)
POTASSIUM SERPL-SCNC: 3.7 MMOL/L — SIGNIFICANT CHANGE UP (ref 3.5–5.3)
PROMYELOCYTES # FLD: 0 % — SIGNIFICANT CHANGE UP (ref 0–0)
RBC # BLD: 4.93 M/UL — SIGNIFICANT CHANGE UP (ref 4.2–5.8)
RBC # FLD: 13 % — SIGNIFICANT CHANGE UP (ref 10.3–14.5)
SARS-COV-2 IGG SERPL QL IA: NEGATIVE — SIGNIFICANT CHANGE UP
SARS-COV-2 IGM SERPL IA-ACNC: <0.1 INDEX — SIGNIFICANT CHANGE UP
SMUDGE CELLS # BLD: PRESENT — SIGNIFICANT CHANGE UP
SODIUM SERPL-SCNC: 133 MMOL/L — LOW (ref 135–145)
SODIUM SERPL-SCNC: 134 MMOL/L — LOW (ref 135–145)
SODIUM SERPL-SCNC: 134 MMOL/L — LOW (ref 135–145)
SPECIMEN SOURCE: SIGNIFICANT CHANGE UP
TRIGL SERPL-MCNC: 363 MG/DL — HIGH (ref 10–149)
TRIGL SERPL-MCNC: 524 MG/DL — HIGH (ref 10–149)
TRIGL SERPL-MCNC: 594 MG/DL — HIGH (ref 10–149)
VARIANT LYMPHS # BLD: 3.8 % — SIGNIFICANT CHANGE UP
WBC # BLD: 11.63 K/UL — HIGH (ref 3.8–10.5)
WBC # FLD AUTO: 11.63 K/UL — HIGH (ref 3.8–10.5)

## 2020-09-25 PROCEDURE — 99233 SBSQ HOSP IP/OBS HIGH 50: CPT | Mod: GC

## 2020-09-25 PROCEDURE — 93306 TTE W/DOPPLER COMPLETE: CPT | Mod: 26

## 2020-09-25 RX ORDER — INSULIN LISPRO 100/ML
VIAL (ML) SUBCUTANEOUS AT BEDTIME
Refills: 0 | Status: DISCONTINUED | OUTPATIENT
Start: 2020-09-25 | End: 2020-09-27

## 2020-09-25 RX ORDER — MAGNESIUM SULFATE 500 MG/ML
1 VIAL (ML) INJECTION ONCE
Refills: 0 | Status: COMPLETED | OUTPATIENT
Start: 2020-09-25 | End: 2020-09-25

## 2020-09-25 RX ORDER — DEXTROSE 50 % IN WATER 50 %
25 SYRINGE (ML) INTRAVENOUS ONCE
Refills: 0 | Status: DISCONTINUED | OUTPATIENT
Start: 2020-09-25 | End: 2020-09-27

## 2020-09-25 RX ORDER — POTASSIUM PHOSPHATE, MONOBASIC POTASSIUM PHOSPHATE, DIBASIC 236; 224 MG/ML; MG/ML
30 INJECTION, SOLUTION INTRAVENOUS ONCE
Refills: 0 | Status: COMPLETED | OUTPATIENT
Start: 2020-09-25 | End: 2020-09-25

## 2020-09-25 RX ORDER — SODIUM CHLORIDE 9 MG/ML
1000 INJECTION, SOLUTION INTRAVENOUS
Refills: 0 | Status: DISCONTINUED | OUTPATIENT
Start: 2020-09-25 | End: 2020-09-27

## 2020-09-25 RX ORDER — INSULIN LISPRO 100/ML
6 VIAL (ML) SUBCUTANEOUS
Refills: 0 | Status: DISCONTINUED | OUTPATIENT
Start: 2020-09-25 | End: 2020-09-27

## 2020-09-25 RX ORDER — POTASSIUM PHOSPHATE, MONOBASIC POTASSIUM PHOSPHATE, DIBASIC 236; 224 MG/ML; MG/ML
15 INJECTION, SOLUTION INTRAVENOUS ONCE
Refills: 0 | Status: COMPLETED | OUTPATIENT
Start: 2020-09-25 | End: 2020-09-25

## 2020-09-25 RX ORDER — FENOFIBRATE,MICRONIZED 130 MG
145 CAPSULE ORAL DAILY
Refills: 0 | Status: DISCONTINUED | OUTPATIENT
Start: 2020-09-25 | End: 2020-09-27

## 2020-09-25 RX ORDER — GLUCAGON INJECTION, SOLUTION 0.5 MG/.1ML
1 INJECTION, SOLUTION SUBCUTANEOUS ONCE
Refills: 0 | Status: DISCONTINUED | OUTPATIENT
Start: 2020-09-25 | End: 2020-09-27

## 2020-09-25 RX ORDER — INSULIN LISPRO 100/ML
VIAL (ML) SUBCUTANEOUS
Refills: 0 | Status: DISCONTINUED | OUTPATIENT
Start: 2020-09-25 | End: 2020-09-27

## 2020-09-25 RX ORDER — INSULIN LISPRO 100/ML
VIAL (ML) SUBCUTANEOUS
Refills: 0 | Status: DISCONTINUED | OUTPATIENT
Start: 2020-09-25 | End: 2020-09-25

## 2020-09-25 RX ORDER — DEXTROSE 50 % IN WATER 50 %
12.5 SYRINGE (ML) INTRAVENOUS ONCE
Refills: 0 | Status: DISCONTINUED | OUTPATIENT
Start: 2020-09-25 | End: 2020-09-27

## 2020-09-25 RX ORDER — ACETAMINOPHEN 500 MG
650 TABLET ORAL EVERY 6 HOURS
Refills: 0 | Status: DISCONTINUED | OUTPATIENT
Start: 2020-09-25 | End: 2020-09-26

## 2020-09-25 RX ORDER — POTASSIUM CHLORIDE 20 MEQ
40 PACKET (EA) ORAL EVERY 4 HOURS
Refills: 0 | Status: COMPLETED | OUTPATIENT
Start: 2020-09-25 | End: 2020-09-25

## 2020-09-25 RX ORDER — INSULIN LISPRO 100/ML
VIAL (ML) SUBCUTANEOUS AT BEDTIME
Refills: 0 | Status: DISCONTINUED | OUTPATIENT
Start: 2020-09-25 | End: 2020-09-25

## 2020-09-25 RX ORDER — ACETAMINOPHEN 500 MG
500 TABLET ORAL ONCE
Refills: 0 | Status: COMPLETED | OUTPATIENT
Start: 2020-09-25 | End: 2020-09-25

## 2020-09-25 RX ORDER — INSULIN GLARGINE 100 [IU]/ML
20 INJECTION, SOLUTION SUBCUTANEOUS
Refills: 0 | Status: DISCONTINUED | OUTPATIENT
Start: 2020-09-25 | End: 2020-09-27

## 2020-09-25 RX ORDER — SODIUM CHLORIDE 9 MG/ML
1000 INJECTION, SOLUTION INTRAVENOUS
Refills: 0 | Status: DISCONTINUED | OUTPATIENT
Start: 2020-09-25 | End: 2020-09-25

## 2020-09-25 RX ORDER — LANOLIN ALCOHOL/MO/W.PET/CERES
3 CREAM (GRAM) TOPICAL AT BEDTIME
Refills: 0 | Status: DISCONTINUED | OUTPATIENT
Start: 2020-09-25 | End: 2020-09-27

## 2020-09-25 RX ORDER — DEXTROSE 50 % IN WATER 50 %
15 SYRINGE (ML) INTRAVENOUS ONCE
Refills: 0 | Status: DISCONTINUED | OUTPATIENT
Start: 2020-09-25 | End: 2020-09-27

## 2020-09-25 RX ADMIN — POTASSIUM PHOSPHATE, MONOBASIC POTASSIUM PHOSPHATE, DIBASIC 62.5 MILLIMOLE(S): 236; 224 INJECTION, SOLUTION INTRAVENOUS at 02:04

## 2020-09-25 RX ADMIN — ENOXAPARIN SODIUM 40 MILLIGRAM(S): 100 INJECTION SUBCUTANEOUS at 11:30

## 2020-09-25 RX ADMIN — INSULIN GLARGINE 20 UNIT(S): 100 INJECTION, SOLUTION SUBCUTANEOUS at 22:28

## 2020-09-25 RX ADMIN — Medication 3 MILLIGRAM(S): at 01:04

## 2020-09-25 RX ADMIN — Medication 2 GRAM(S): at 19:14

## 2020-09-25 RX ADMIN — OXYCODONE HYDROCHLORIDE 5 MILLIGRAM(S): 5 TABLET ORAL at 16:47

## 2020-09-25 RX ADMIN — Medication 30 MILLILITER(S): at 20:00

## 2020-09-25 RX ADMIN — Medication 650 MILLIGRAM(S): at 01:34

## 2020-09-25 RX ADMIN — Medication 40 MILLIEQUIVALENT(S): at 05:49

## 2020-09-25 RX ADMIN — ATORVASTATIN CALCIUM 20 MILLIGRAM(S): 80 TABLET, FILM COATED ORAL at 21:21

## 2020-09-25 RX ADMIN — Medication 650 MILLIGRAM(S): at 19:00

## 2020-09-25 RX ADMIN — CHLORHEXIDINE GLUCONATE 1 APPLICATION(S): 213 SOLUTION TOPICAL at 06:02

## 2020-09-25 RX ADMIN — POTASSIUM PHOSPHATE, MONOBASIC POTASSIUM PHOSPHATE, DIBASIC 83.33 MILLIMOLE(S): 236; 224 INJECTION, SOLUTION INTRAVENOUS at 13:30

## 2020-09-25 RX ADMIN — Medication 2 GRAM(S): at 05:49

## 2020-09-25 RX ADMIN — OXYCODONE HYDROCHLORIDE 5 MILLIGRAM(S): 5 TABLET ORAL at 17:30

## 2020-09-25 RX ADMIN — Medication 40 MILLIEQUIVALENT(S): at 02:03

## 2020-09-25 RX ADMIN — Medication 100 MILLIGRAM(S): at 11:29

## 2020-09-25 RX ADMIN — Medication 650 MILLIGRAM(S): at 19:53

## 2020-09-25 RX ADMIN — Medication 500 MILLIGRAM(S): at 21:21

## 2020-09-25 RX ADMIN — Medication 81 MILLIGRAM(S): at 11:29

## 2020-09-25 RX ADMIN — Medication 100 GRAM(S): at 10:58

## 2020-09-25 RX ADMIN — Medication 100 GRAM(S): at 11:42

## 2020-09-25 RX ADMIN — Medication 500 MILLIGRAM(S): at 21:51

## 2020-09-25 RX ADMIN — Medication 1 TABLET(S): at 11:30

## 2020-09-25 RX ADMIN — Medication 650 MILLIGRAM(S): at 01:04

## 2020-09-25 RX ADMIN — Medication 1 MILLIGRAM(S): at 11:30

## 2020-09-25 RX ADMIN — Medication 145 MILLIGRAM(S): at 11:30

## 2020-09-25 NOTE — CHART NOTE - NSCHARTNOTEFT_GEN_A_CORE
MICU Transfer Note    Transfer from: MICU    Transfer to: ( x ) Medicine    (  ) Telemetry     (   ) RCU        (    ) Palliative         (   ) Stroke Unit          (   ) __________________    Accepting physician:      MICU COURSE:  61yo M w/ [id number] (not on medication) p/w abdominal pain for 1 day a/w pancreatitis likely 2/2 hypertriglyceridemia. In the MICU, patient treated w/ IVF and placed on Insulin gtt. Initial TGs were > 300. Patient taken off insulin gtt when TGs were found to be < 500. 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 Vascular surgery was consulted and recommended ASA. Endocrine was also consulted for uncontrolled T2DM (patient non-adherent w/ home metformin). Pt also oliguric, treated w/ continued IVF @ 200/hr for 6 hours on 9/25. Patient was febrile during MICU course, however fever attributed to pancreatitis. Pt w/ leukocytosis (~11K), however non septic appearing. Blood and urine cultures remained negative. Antibiotics were not given; patient monitored.     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    PLAN  NEUROLOGIC:  -A&Ox3   -No active issues    SKIN:  Lines: 2 peripheral IVs  Decubiti: none    GI:  #Pancreatitis  -Pain control with oxycodone  -Clear liquid diet  -TGs downtrending 1067 <-- 1843 <-- 2494 <-- 3826  -Q12 TG levels  -C/w IVF, D5 1/2NS  -Endocrine consulted    #Celiac artery dissection  -Vascular surgery consulted  -F/u recommendations      METABOLIC:  #Diabetes  -Glucose levels stable in low 100s  -On insulin drip 6units  -No anion gap  -A1C 11.8  -Potassium repleted  -Endocrine consulted   -Q4 BMPs, POCT glucose q1      CMP 09-24-20 @ 06:25    135  |  98  |  7   ----------------------------<  110<H>  3.4<L>   |  22  |  0.60    Ca    8.4      09-24-20 @ 06:25  Phos  2.8     09-24  Mg     2.0     09-24    TPro  5.8<L>  /  Alb  4.1  /  TBili  0.7  /  DBili  x   /  AST  45<H>  /  ALT  21  /  AlkPhos  47  09-23      Serum Cr trend: 0.60 <-- , 0.59 <-- , 0.51 <-- , 0.52 <-- , 0.53 <--     VOLUME ASSESSMENT:  -No peripheral edema  -No evidence of disturbance of effective circulating volume  -Adequate urine output  -C/w IVF    HEMATOLOGIC:  -No active issues  -DVT prophylaxis with enaxoparin     CBC 09-24-20 @ 01:00                        15.0   11.87 )-----------( 255                   41.1       Hgb trend: 15.0 <-- , 15.1 <--   WBC trend: 11.87 <-- , 11.48 <--     PT/INR - ( 23 Sep 2020 12:24 )   PT: Test not performed SAMPLE IS GROSSLY LIPEMIC  TRY TO RUN ON ST4...REJECTED SEC;   INR: Test not performed         PTT - ( 23 Sep 2020 12:24 ):Test not performed NOTIFIED ,B  09/23/20 1354:  APTT previously reported as: Test not performed  SEC SEC    INFECTIOUS DISEASE:  -F/u urine and blood clx    HEMODYNAMICS:  -Stable  -Patient not on pressors    CARDIOVASCULAR:  -New systolic murmur  -F/u TTE    RESPIRATORY:  -Saturating high 90s on 2L NC    ETHICS:   -No active issues      Vital Signs Last 24 Hrs  T(C): 37.5 (25 Sep 2020 08:00), Max: 38.7 (25 Sep 2020 00:00)  T(F): 99.5 (25 Sep 2020 08:00), Max: 101.7 (25 Sep 2020 00:00)  HR: 95 (25 Sep 2020 09:00) (84 - 115)  BP: 114/91 (25 Sep 2020 09:00) (101/66 - 150/90)  BP(mean): 99 (25 Sep 2020 09:00) (75 - 107)  RR: 23 (25 Sep 2020 09:00) (17 - 31)  SpO2: 94% (25 Sep 2020 09:00) (92% - 97%)  I&O's Summary    24 Sep 2020 07:01  -  25 Sep 2020 07:00  --------------------------------------------------------  IN: 3383 mL / OUT: 750 mL / NET: 2633 mL        MEDICATIONS  (STANDING):  aspirin enteric coated 81 milliGRAM(s) Oral daily  atorvastatin 20 milliGRAM(s) Oral at bedtime  bisacodyl 5 milliGRAM(s) Oral at bedtime  chlorhexidine 4% Liquid 1 Application(s) Topical <User Schedule>  dextrose 5% + sodium chloride 0.45%. 1000 milliLiter(s) (200 mL/Hr) IV Continuous <Continuous>  enoxaparin Injectable 40 milliGRAM(s) SubCutaneous daily  fenofibrate Tablet 145 milliGRAM(s) Oral daily  folic acid 1 milliGRAM(s) Oral daily  influenza   Vaccine 0.5 milliLiter(s) IntraMuscular once  insulin regular Infusion 3 Unit(s)/Hr (3 mL/Hr) IV Continuous <Continuous>  magnesium sulfate  IVPB 1 Gram(s) IV Intermittent once  multivitamin 1 Tablet(s) Oral daily  omega-3-Acid Ethyl Esters 2 Gram(s) Oral two times a day  thiamine 100 milliGRAM(s) Oral daily    MEDICATIONS  (PRN):  acetaminophen   Tablet .. 650 milliGRAM(s) Oral every 6 hours PRN Temp greater or equal to 38C (100.4F), Mild Pain (1 - 3)  melatonin 3 milliGRAM(s) Oral at bedtime PRN Insomnia  oxyCODONE    IR 5 milliGRAM(s) Oral every 4 hours PRN Severe Pain (7 - 10)        LABS                                            14.2                  Neurophils% (auto):   69.7   (09-25 @ 04:18):    11.63)-----------(123          Lymphocytes% (auto):  19.9                                          43.3                   Eosinphils% (auto):   0.8      Manual%: Neutrophils 73.8 ; Lymphocytes 11.2 ; Eosinophils 0.0  ; Bands%: 1.9  ; Blasts 0                                    134    |  99     |  7                   Calcium: 8.4   / iCa: x      (09-25 @ 04:18)    ----------------------------<  90        Magnesium: 1.8                              3.7     |  21     |  0.78             Phosphorous: 2.9 MICU Transfer Note    Transfer from: MICU    Transfer to: ( x ) Medicine    (  ) Telemetry     (   ) RCU        (    ) Palliative         (   ) Stroke Unit          (   ) __________________    Accepting physician:      MICU COURSE:  61yo M w/ [id number] (not on medication) p/w abdominal pain for 1 day a/w pancreatitis likely 2/2 hypertriglyceridemia. In the MICU, patient treated w/ IVF and placed on Insulin gtt. Initial TGs were > 300. Patient taken off insulin gtt when TGs were found to be < 500. 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 Vascular surgery was consulted and recommended ASA and mesenteric duplex. Endocrine was also consulted for uncontrolled T2DM (A1c 11.8, patient non-adherent w/ home metformin). Pt also oliguric, treated w/ continued IVF @ 200/hr for 6 hours on 9/25. Patient was febrile during MICU course, however fever attributed to pancreatitis. Pt w/ leukocytosis (~11K), however non septic appearing. Blood and urine cultures remained negative. Antibiotics were not given; patient monitored. Insulin gtt turned off at 12PM.     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    PLAN  NEUROLOGIC:  -A&Ox3   -No active issues    SKIN:  Lines: 2 peripheral IVs  Decubiti: none    GI:  #Pancreatitis  -Pain control with oxycodone  -Clear liquid diet  -TGs downtrending 1067 <-- 1843 <-- 2494 <-- 3826  -Q12 TG levels  -C/w IVF, D5 1/2NS  -Endocrine consulted    #Celiac artery dissection  -Vascular surgery consulted  -F/u recommendations      METABOLIC:  #Diabetes  -Glucose levels stable in low 100s  -On insulin drip 6units  -No anion gap  -A1C 11.8  -Potassium repleted  -Endocrine consulted   -Q4 BMPs, POCT glucose q1      CMP 09-24-20 @ 06:25    135  |  98  |  7   ----------------------------<  110<H>  3.4<L>   |  22  |  0.60    Ca    8.4      09-24-20 @ 06:25  Phos  2.8     09-24  Mg     2.0     09-24    TPro  5.8<L>  /  Alb  4.1  /  TBili  0.7  /  DBili  x   /  AST  45<H>  /  ALT  21  /  AlkPhos  47  09-23      Serum Cr trend: 0.60 <-- , 0.59 <-- , 0.51 <-- , 0.52 <-- , 0.53 <--     VOLUME ASSESSMENT:  -No peripheral edema  -No evidence of disturbance of effective circulating volume  -Adequate urine output  -C/w IVF    HEMATOLOGIC:  -No active issues  -DVT prophylaxis with enaxoparin     CBC 09-24-20 @ 01:00                        15.0   11.87 )-----------( 255                   41.1       Hgb trend: 15.0 <-- , 15.1 <--   WBC trend: 11.87 <-- , 11.48 <--     PT/INR - ( 23 Sep 2020 12:24 )   PT: Test not performed SAMPLE IS GROSSLY LIPEMIC  TRY TO RUN ON ST4...REJECTED SEC;   INR: Test not performed         PTT - ( 23 Sep 2020 12:24 ):Test not performed NOTIFIED ,B  09/23/20 1354:  APTT previously reported as: Test not performed  SEC SEC    INFECTIOUS DISEASE:  -F/u urine and blood clx    HEMODYNAMICS:  -Stable  -Patient not on pressors    CARDIOVASCULAR:  -New systolic murmur  -F/u TTE    RESPIRATORY:  -Saturating high 90s on 2L NC    ETHICS:   -No active issues      Vital Signs Last 24 Hrs  T(C): 37.5 (25 Sep 2020 08:00), Max: 38.7 (25 Sep 2020 00:00)  T(F): 99.5 (25 Sep 2020 08:00), Max: 101.7 (25 Sep 2020 00:00)  HR: 95 (25 Sep 2020 09:00) (84 - 115)  BP: 114/91 (25 Sep 2020 09:00) (101/66 - 150/90)  BP(mean): 99 (25 Sep 2020 09:00) (75 - 107)  RR: 23 (25 Sep 2020 09:00) (17 - 31)  SpO2: 94% (25 Sep 2020 09:00) (92% - 97%)  I&O's Summary    24 Sep 2020 07:01  -  25 Sep 2020 07:00  --------------------------------------------------------  IN: 3383 mL / OUT: 750 mL / NET: 2633 mL        MEDICATIONS  (STANDING):  aspirin enteric coated 81 milliGRAM(s) Oral daily  atorvastatin 20 milliGRAM(s) Oral at bedtime  bisacodyl 5 milliGRAM(s) Oral at bedtime  chlorhexidine 4% Liquid 1 Application(s) Topical <User Schedule>  dextrose 5% + sodium chloride 0.45%. 1000 milliLiter(s) (200 mL/Hr) IV Continuous <Continuous>  enoxaparin Injectable 40 milliGRAM(s) SubCutaneous daily  fenofibrate Tablet 145 milliGRAM(s) Oral daily  folic acid 1 milliGRAM(s) Oral daily  influenza   Vaccine 0.5 milliLiter(s) IntraMuscular once  insulin regular Infusion 3 Unit(s)/Hr (3 mL/Hr) IV Continuous <Continuous>  magnesium sulfate  IVPB 1 Gram(s) IV Intermittent once  multivitamin 1 Tablet(s) Oral daily  omega-3-Acid Ethyl Esters 2 Gram(s) Oral two times a day  thiamine 100 milliGRAM(s) Oral daily    MEDICATIONS  (PRN):  acetaminophen   Tablet .. 650 milliGRAM(s) Oral every 6 hours PRN Temp greater or equal to 38C (100.4F), Mild Pain (1 - 3)  melatonin 3 milliGRAM(s) Oral at bedtime PRN Insomnia  oxyCODONE    IR 5 milliGRAM(s) Oral every 4 hours PRN Severe Pain (7 - 10)        LABS                                            14.2                  Neurophils% (auto):   69.7   (09-25 @ 04:18):    11.63)-----------(123          Lymphocytes% (auto):  19.9                                          43.3                   Eosinphils% (auto):   0.8      Manual%: Neutrophils 73.8 ; Lymphocytes 11.2 ; Eosinophils 0.0  ; Bands%: 1.9  ; Blasts 0                                    134    |  99     |  7                   Calcium: 8.4   / iCa: x      (09-25 @ 04:18)    ----------------------------<  90        Magnesium: 1.8                              3.7     |  21     |  0.78             Phosphorous: 2.9

## 2020-09-25 NOTE — PROGRESS NOTE ADULT - ASSESSMENT
59 yo M with PMHx of Type2 Diabetes and HLD, not on any medications presenting with abdominal pain. Patient found to have pancreatitis in the setting of elevated triglyceride levels, now on insulin gtt.      Problem 1: Hypertriglyceridemia c/b pancreatitis, now improving, s/p insulin gtt  - Atorvastatin 20mg nightly  - Fenofibrate 145 mg daily  - Lovaza 2g BID    Problem 2: Type 2 Diabetes (HbA1c 11.8)  - Basal/Bolus  - Low dose correctional AC meals and qhs  - Fingersticks AC meals and qhs  - Nutrition consult  - Consistent carb diet    Discharge plan:  - Patient should be discharged on basal/bolus (pens) or basal + oral agents-- TBD  - Patient should f/u with Endocrine at:    [facility]   [address] [place] [address]  [phone]    Nery Reed M.D  Endocrine Fellow  Pager #[phone]       61 yo M with PMHx of Type2 Diabetes and HLD, not on any medications presenting with abdominal pain. Patient found to have pancreatitis in the setting of elevated triglyceride levels, now on insulin gtt.      Problem 1: Hypertriglyceridemia c/b pancreatitis, now improving, s/p insulin gtt  - Atorvastatin 20mg nightly  - Fenofibrate 145 mg daily  - Lovaza 2g BID    Problem 2: Type 2 Diabetes (HbA1c 11.8)  - Start Lantus 20 units daily (First dose now)  - Humalog 6 units AC meals (Hold if patient is NPO)  - Moderate dose correctional AC meals and qhs  - Fingersticks AC meals and qhs  - Nutrition consult  - Consistent carb diet    Discharge plan:  - Patient should be discharged on basal + oral agents-- TBD  - Patient should f/u with Endocrine at:    [facility]   [address] [place] [id number] [phone]    Nery Reed M.D  Endocrine Fellow  Pager #[phone]

## 2020-09-25 NOTE — PROGRESS NOTE ADULT - ASSESSMENT
60M w/ pmh DM (not on medication) presenting with abdominal pain 2/2 pancreatitis. CT demonstrating celiac artery dissection.     - c/w ASA/statin  - CTA reviewed  -please obtain mesenteric duplex to evaluate if celiac artery dissection can be seen on duplex, this will function as a baseline duplex for follow up as outpatient    Discussed with Dr. Valdez      Vascular Surgery (C Team Surgery)  [id number] with any questions

## 2020-09-25 NOTE — DIETITIAN INITIAL EVALUATION ADULT. - OTHER INFO
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 Pt. visited and discussed the need for therapeutic diet , pt. admitted non compliance to therapeutic diet , but states he received diet education from out patient , able to state most of the carb containing foods but unable to verbalize carbohydrate allowances for meals of label reading . Pt. was verbalizing plate model but portion control not aware. Pt. instructed to diet - written and verbal and encouraged to follow up @ endocrine office .

## 2020-09-25 NOTE — PROGRESS NOTE ADULT - SUBJECTIVE AND OBJECTIVE BOX
Vascular Surgery Progress Note     S: Patient resting comfortably on morning rounds. Pain well-controlled currently.   insulin gtt  says pain is much improved since admission   +/+    Physical Exam:    General: NAD, AOx3  abd: softly distended, minimally TTP epigastric    T(C): 37.3 (09-25-20 @ 04:00), Max: 38.7 (09-25-20 @ 00:00)  HR: 95 (09-25-20 @ 06:00) (83 - 115)  BP: 112/86 (09-25-20 @ 06:00) (101/66 - 150/90)  RR: 19 (09-25-20 @ 06:00) (17 - 31)  SpO2: 94% (09-25-20 @ 06:00) (91% - 97%)    09-24-20 @ 07:01  -  09-25-20 @ 07:00  --------------------------------------------------------  IN: 3383 mL / OUT: 750 mL / NET: 2633 mL        LABS:                        14.2   11.63 )-----------( 123      ( 25 Sep 2020 04:18 )             43.3     09-25    134<L>  |  99  |  7   ----------------------------<  90  3.7   |  21<L>  |  0.78    Ca    8.4      25 Sep 2020 04:18  Phos  2.9     09-25  Mg     1.8     09-25    TPro  5.8<L>  /  Alb  4.1  /  TBili  0.7  /  DBili  x   /  AST  45<H>  /  ALT  21  /  AlkPhos  47  09-23

## 2020-09-25 NOTE — PROGRESS NOTE ADULT - SUBJECTIVE AND OBJECTIVE BOX
Endocrine F/u Note:    Chief Complaint:  59 yo M with PMHx of Type2 Diabetes and HLD, not on any medications presenting with abdominal pain. Patient found to have pancreatitis in the setting of elevated triglyceride levels, now on insulin gtt.      Interval History:  Patient transitioned off of insulin gtt. TG now ~300s.  Patient reports improvement in abdominal pain and denies n/v    MEDICATIONS  (STANDING):  aspirin enteric coated 81 milliGRAM(s) Oral daily  atorvastatin 20 milliGRAM(s) Oral at bedtime  bisacodyl 5 milliGRAM(s) Oral at bedtime  chlorhexidine 4% Liquid 1 Application(s) Topical <User Schedule>  dextrose 5% + sodium chloride 0.45%. 1000 milliLiter(s) (200 mL/Hr) IV Continuous <Continuous>  dextrose 5%. 1000 milliLiter(s) (50 mL/Hr) IV Continuous <Continuous>  dextrose 50% Injectable 12.5 Gram(s) IV Push once  dextrose 50% Injectable 25 Gram(s) IV Push once  dextrose 50% Injectable 25 Gram(s) IV Push once  enoxaparin Injectable 40 milliGRAM(s) SubCutaneous daily  fenofibrate Tablet 145 milliGRAM(s) Oral daily  folic acid 1 milliGRAM(s) Oral daily  influenza   Vaccine 0.5 milliLiter(s) IntraMuscular once  insulin lispro (HumaLOG) corrective regimen sliding scale   SubCutaneous three times a day before meals  insulin lispro (HumaLOG) corrective regimen sliding scale   SubCutaneous at bedtime  multivitamin 1 Tablet(s) Oral daily  omega-3-Acid Ethyl Esters 2 Gram(s) Oral two times a day  potassium phosphate IVPB 30 milliMole(s) IV Intermittent once  thiamine 100 milliGRAM(s) Oral daily    MEDICATIONS  (PRN):  acetaminophen   Tablet .. 650 milliGRAM(s) Oral every 6 hours PRN Temp greater or equal to 38C (100.4F), Mild Pain (1 - 3)  dextrose 40% Gel 15 Gram(s) Oral once PRN Blood Glucose LESS THAN 70 milliGRAM(s)/deciliter  glucagon  Injectable 1 milliGRAM(s) IntraMuscular once PRN Glucose LESS THAN 70 milligrams/deciliter  melatonin 3 milliGRAM(s) Oral at bedtime PRN Insomnia  oxyCODONE    IR 5 milliGRAM(s) Oral every 4 hours PRN Severe Pain (7 - 10)    Review of Systems:  Constitutional: No fever  Cardiovascular: No chest pain, palpitations  Respiratory: No SOB, no cough  GI: no nausea, vomiting, + abdominal pain  : No dysuria  Endocrine: no polyuria, polydipsia    PHYSICAL EXAM:  Vital Signs Last 24 Hrs  T(C): 37.5 (25 Sep 2020 08:00), Max: 38.7 (25 Sep 2020 00:00)  T(F): 99.5 (25 Sep 2020 08:00), Max: 101.7 (25 Sep 2020 00:00)  HR: 89 (25 Sep 2020 13:00) (86 - 105)  BP: 133/90 (25 Sep 2020 13:00) (101/66 - 150/90)  BP(mean): 103 (25 Sep 2020 13:00) (75 - 107)  RR: 28 (25 Sep 2020 13:00) (18 - 31)  SpO2: 93% (25 Sep 2020 13:00) (92% - 97%)  GENERAL: NAD, well-developed  EYES: No proptosis, anicteric  HEENT:  Atraumatic, Normocephalic, moist mucous membranes  RESPIRATORY: Clear to auscultation bilaterally; No rales, rhonchi, wheezing  CARDIOVASCULAR: Regular rate and rhythm; no peripheral edema  GI: Soft, +BS, tender to palpation  PSYCH: Alert and oriented x 3, normal affect, normal mood    CAPILLARY BLOOD GLUCOSE    POCT Blood Glucose.: 197 mg/dL (25 Sep 2020 12:09)  POCT Blood Glucose.: 162 mg/dL (25 Sep 2020 10:52)  POCT Blood Glucose.: 160 mg/dL (25 Sep 2020 10:11)  POCT Blood Glucose.: 161 mg/dL (25 Sep 2020 09:07)  POCT Blood Glucose.: 155 mg/dL (25 Sep 2020 07:56)  POCT Blood Glucose.: 136 mg/dL (25 Sep 2020 06:53)  POCT Blood Glucose.: 113 mg/dL (25 Sep 2020 06:01)  POCT Blood Glucose.: 98 mg/dL (25 Sep 2020 04:54)  POCT Blood Glucose.: 93 mg/dL (25 Sep 2020 04:05)  POCT Blood Glucose.: 85 mg/dL (25 Sep 2020 03:10)  POCT Blood Glucose.: 141 mg/dL (25 Sep 2020 02:02)  POCT Blood Glucose.: 117 mg/dL (25 Sep 2020 01:02)  POCT Blood Glucose.: 111 mg/dL (25 Sep 2020 00:15)  POCT Blood Glucose.: 121 mg/dL (24 Sep 2020 23:06)  POCT Blood Glucose.: 114 mg/dL (24 Sep 2020 22:06)  POCT Blood Glucose.: 115 mg/dL (24 Sep 2020 20:58)  POCT Blood Glucose.: 105 mg/dL (24 Sep 2020 19:56)  POCT Blood Glucose.: 104 mg/dL (24 Sep 2020 18:51)  POCT Blood Glucose.: 104 mg/dL (24 Sep 2020 18:05)  POCT Blood Glucose.: 113 mg/dL (24 Sep 2020 17:13)    09-25    133<L>  |  99  |  8   ----------------------------<  175<H>  3.6   |  21<L>  |  0.74    Ca    8.1<L>      25 Sep 2020 10:15  Phos  1.8     09-25  Mg     1.7     09-25    TPro  5.8<L>  /  Alb  4.1  /  TBili  0.7  /  DBili  x   /  AST  45<H>  /  ALT  21  /  AlkPhos  47  09-23    HbA1c 11.8    09-24 Chol -- LDL -- HDL -- Trig 1067<H>, 09-24 Chol -- LDL -- HDL -- Trig 1843<H>, 09-23 Chol -- LDL -- HDL -- Trig 2494<H>, 09-23 Chol 559<H> LDL 27 HDL 17<L> Trig 3826<H>    CT Abdomen and Pelvis w/ IV Cont  IMPRESSION:  Acute interstitial edematous pancreatitis. No evidence of peripancreatic fluid collection or necrosis. 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

## 2020-09-25 NOTE — PROGRESS NOTE ADULT - SUBJECTIVE AND OBJECTIVE BOX
CHIEF COMPLAINT:    Interval Events:    REVIEW OF SYSTEMS:  Constitutional: [ ] negative [ ] fevers [ ] chills [ ] weight loss [ ] weight gain  HEENT: [ ] negative [ ] dry eyes [ ] eye irritation [ ] postnasal drip [ ] nasal congestion  CV: [ ] negative  [ ] chest pain [ ] orthopnea [ ] palpitations [ ] murmur  Resp: [ ] negative [ ] cough [ ] shortness of breath [ ] dyspnea [ ] wheezing [ ] sputum [ ] hemoptysis  GI: [ ] negative [ ] nausea [ ] vomiting [ ] diarrhea [ ] constipation [ ] abd pain [ ] dysphagia   : [ ] negative [ ] dysuria [ ] nocturia [ ] hematuria [ ] increased urinary frequency  Musculoskeletal: [ ] negative [ ] back pain [ ] myalgias [ ] arthralgias [ ] fracture  Skin: [ ] negative [ ] rash [ ] itch  Neurological: [ ] negative [ ] headache [ ] dizziness [ ] syncope [ ] weakness [ ] numbness  Psychiatric: [ ] negative [ ] anxiety [ ] depression  Endocrine: [ ] negative [ ] diabetes [ ] thyroid problem  Hematologic/Lymphatic: [ ] negative [ ] anemia [ ] bleeding problem  Allergic/Immunologic: [ ] negative [ ] itchy eyes [ ] nasal discharge [ ] hives [ ] angioedema  [ ] All other systems negative  [ ] Unable to assess ROS because ________    OBJECTIVE:  ICU Vital Signs Last 24 Hrs  T(C): 37.3 (25 Sep 2020 04:00), Max: 38.7 (25 Sep 2020 00:00)  T(F): 99.2 (25 Sep 2020 04:00), Max: 101.7 (25 Sep 2020 00:00)  HR: 95 (25 Sep 2020 06:00) (83 - 115)  BP: 112/86 (25 Sep 2020 06:00) (101/66 - 150/90)  BP(mean): 95 (25 Sep 2020 06:00) (75 - 107)  ABP: --  ABP(mean): --  RR: 19 (25 Sep 2020 06:00) (17 - 31)  SpO2: 94% (25 Sep 2020 06:00) (91% - 97%)        09-23 @ 07:01  -  09-24 @ 07:00  --------------------------------------------------------  IN: 1452 mL / OUT: 550 mL / NET: 902 mL    09-24 @ 07:01  -  09-25 @ 06:18  --------------------------------------------------------  IN: 3383 mL / OUT: 750 mL / NET: 2633 mL      CAPILLARY BLOOD GLUCOSE      POCT Blood Glucose.: 113 mg/dL (25 Sep 2020 06:01)      PHYSICAL EXAM:  General:   HEENT:   Lymph Nodes:  Neck:   Respiratory:   Cardiovascular:   Abdomen:   Extremities:   Skin:   Neurological:  Psychiatry:    LINES:    HOSPITAL MEDICATIONS:  Standing Meds:  aspirin enteric coated 81 milliGRAM(s) Oral daily  atorvastatin 20 milliGRAM(s) Oral at bedtime  bisacodyl 5 milliGRAM(s) Oral at bedtime  chlorhexidine 4% Liquid 1 Application(s) Topical <User Schedule>  dextrose 5% + sodium chloride 0.45%. 1000 milliLiter(s) IV Continuous <Continuous>  enoxaparin Injectable 40 milliGRAM(s) SubCutaneous daily  folic acid 1 milliGRAM(s) Oral daily  influenza   Vaccine 0.5 milliLiter(s) IntraMuscular once  insulin regular Infusion 6 Unit(s)/Hr IV Continuous <Continuous>  multivitamin 1 Tablet(s) Oral daily  omega-3-Acid Ethyl Esters 2 Gram(s) Oral two times a day  thiamine 100 milliGRAM(s) Oral daily      PRN Meds:  acetaminophen   Tablet .. 650 milliGRAM(s) Oral every 6 hours PRN  melatonin 3 milliGRAM(s) Oral at bedtime PRN  oxyCODONE    IR 5 milliGRAM(s) Oral every 4 hours PRN      LABS:                        14.2   11.63 )-----------( 123      ( 25 Sep 2020 04:18 )             43.3     Hgb Trend: 14.2<--, 15.0<--, 15.1<--  09-25    134<L>  |  99  |  7   ----------------------------<  90  3.7   |  21<L>  |  0.78    Ca    8.4      25 Sep 2020 04:18  Phos  2.9     09-25  Mg     1.8     09-25    TPro  5.8<L>  /  Alb  4.1  /  TBili  0.7  /  DBili  x   /  AST  45<H>  /  ALT  21  /  AlkPhos  47  09-23    Creatinine Trend: 0.78<--, 0.73<--, 0.70<--, 0.68<--, 0.60<--, 0.60<--  PT/INR - ( 23 Sep 2020 12:24 )   PT: Test not performed SAMPLE IS GROSSLY LIPEMIC  TRY TO RUN ON ST4...REJECTED SEC;   INR: Test not performed         PTT - ( 23 Sep 2020 12:24 )  PTT:Test not performed NOTIFIED GARY TIRADO  09/23/20 1354:  APTT previously reported as: Test not performed  SEC SEC  Urinalysis Basic - ( 24 Sep 2020 03:20 )    Color: YELLOW / Appearance: CLEAR / SG: > 1.040 / pH: 6.0  Gluc: 1000 / Ketone: TRACE  / Bili: NEGATIVE / Urobili: NORMAL   Blood: MODERATE / Protein: 200 / Nitrite: NEGATIVE   Leuk Esterase: NEGATIVE / RBC: 6-10 / WBC 3-5   Sq Epi: FEW / Non Sq Epi: x / Bacteria: NEGATIVE        Venous Blood Gas:  09-23 @ 19:46  7.43/35/54/24/91.0  VBG Lactate: 1.5  Venous Blood Gas:  09-23 @ 14:48  7.31/48/26/21/42.9  VBG Lactate: 1.8      MICROBIOLOGY:     RADIOLOGY:  [ ] Reviewed and interpreted by me    EKG: Sari Guardado MD  Emergency Medicine PGY-1    CHIEF COMPLAINT: 61 y/o M w/ [id number] (not adherent to Metformin) p/w abdominal pain for 1 day a/w pancreatitis likely 2/2 hypertriglyceridemia, 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    Interval Events:  Overnight patient spiked fever to 100.7. Given Tylenol.  Patient remains on 2L NC, saturating mid to high 90s.  Abdominal CTA 9/23 showed progression of changes 2/2 acute pancreatitis, but not necrosis or fluid collection. Celiac artery dissection unchanged.  Insulin drip dc'd, TGs below 500. Endocrine following.     REVIEW OF SYSTEMS:  CONST: no chills  EYES: no pain, no vision changes  ENT: no sore throat, no ear pain, no change in hearing  CV: no chest pain, no leg swelling  RESP: no shortness of breath, no cough  ABD: + abdominal pain, no nausea, no vomiting, no diarrhea  : no dysuria, no flank pain, no hematuria  MSK: no back pain, no extremity pain  NEURO: no headache or additional neurologic complaints  HEME: no easy bleeding  SKIN:  no rash       OBJECTIVE:  ICU Vital Signs Last 24 Hrs  T(C): 37.3 (25 Sep 2020 04:00), Max: 38.7 (25 Sep 2020 00:00)  T(F): 99.2 (25 Sep 2020 04:00), Max: 101.7 (25 Sep 2020 00:00)  HR: 95 (25 Sep 2020 06:00) (83 - 115)  BP: 112/86 (25 Sep 2020 06:00) (101/66 - 150/90)  BP(mean): 95 (25 Sep 2020 06:00) (75 - 107)  ABP: --  ABP(mean): --  RR: 19 (25 Sep 2020 06:00) (17 - 31)  SpO2: 94% (25 Sep 2020 06:00) (91% - 97%)        09-23 @ 07:01  -  09-24 @ 07:00  --------------------------------------------------------  IN: 1452 mL / OUT: 550 mL / NET: 902 mL    09-24 @ 07:01  - 09-25 @ 06:18  --------------------------------------------------------  IN: 3383 mL / OUT: 750 mL / NET: 2633 mL      CAPILLARY BLOOD GLUCOSE      POCT Blood Glucose.: 113 mg/dL (25 Sep 2020 06:01)      PHYSICAL EXAM:  GENERAL: Awake, alert, NAD  HEENT: NC/AT, moist mucous membranes, PERRL, EOMI  LUNGS: CTAB, no wheezes or crackles   CARDIAC: RRR, normal S1/S2. Systolic murmur.  ABDOMEN: RUQ TTP. Soft, normal BS, non distended, no rebound, no guarding  EXT: No edema, no calf tenderness, 2+ DP pulses bilaterally, no deformities.  NEURO: A&Ox3. Moving all extremities. CN II-XII grossly intact.   SKIN: Warm and dry. No rash.  PSYCH: Normal affect.       LINES: 2 peripheral IVs    HOSPITAL MEDICATIONS:  Standing Meds:  aspirin enteric coated 81 milliGRAM(s) Oral daily  atorvastatin 20 milliGRAM(s) Oral at bedtime  bisacodyl 5 milliGRAM(s) Oral at bedtime  chlorhexidine 4% Liquid 1 Application(s) Topical <User Schedule>  dextrose 5% + sodium chloride 0.45%. 1000 milliLiter(s) IV Continuous <Continuous>  enoxaparin Injectable 40 milliGRAM(s) SubCutaneous daily  folic acid 1 milliGRAM(s) Oral daily  influenza   Vaccine 0.5 milliLiter(s) IntraMuscular once  insulin regular Infusion 6 Unit(s)/Hr IV Continuous <Continuous>  multivitamin 1 Tablet(s) Oral daily  omega-3-Acid Ethyl Esters 2 Gram(s) Oral two times a day  thiamine 100 milliGRAM(s) Oral daily      PRN Meds:  acetaminophen   Tablet .. 650 milliGRAM(s) Oral every 6 hours PRN  melatonin 3 milliGRAM(s) Oral at bedtime PRN  oxyCODONE    IR 5 milliGRAM(s) Oral every 4 hours PRN      LABS:                        14.2   11.63 )-----------( 123      ( 25 Sep 2020 04:18 )             43.3     Hgb Trend: 14.2<--, 15.0<--, 15.1<--  09-25    134<L>  |  99  |  7   ----------------------------<  90  3.7   |  21<L>  |  0.78    Ca    8.4      25 Sep 2020 04:18  Phos  2.9     09-25  Mg     1.8     09-25    TPro  5.8<L>  /  Alb  4.1  /  TBili  0.7  /  DBili  x   /  AST  45<H>  /  ALT  21  /  AlkPhos  47  09-23    Creatinine Trend: 0.78<--, 0.73<--, 0.70<--, 0.68<--, 0.60<--, 0.60<--  PT/INR - ( 23 Sep 2020 12:24 )   PT: Test not performed SAMPLE IS GROSSLY LIPEMIC  TRY TO RUN ON ST4...REJECTED SEC;   INR: Test not performed         PTT - ( 23 Sep 2020 12:24 )  PTT:Test not performed NOTIFIED ,B  09/23/20 1354:  APTT previously reported as: Test not performed  SEC SEC  Urinalysis Basic - ( 24 Sep 2020 03:20 )    Color: YELLOW / Appearance: CLEAR / SG: > 1.040 / pH: 6.0  Gluc: 1000 / Ketone: TRACE  / Bili: NEGATIVE / Urobili: NORMAL   Blood: MODERATE / Protein: 200 / Nitrite: NEGATIVE   Leuk Esterase: NEGATIVE / RBC: 6-10 / WBC 3-5   Sq Epi: FEW / Non Sq Epi: x / Bacteria: NEGATIVE        Venous Blood Gas:  09-23 @ 19:46  7.43/35/54/24/91.0  VBG Lactate: 1.5  Venous Blood Gas:  09-23 @ 14:48  7.31/48/26/21/42.9  VBG Lactate: 1.8      MICROBIOLOGY:       RADIOLOGY:  CTA 9/23: Interval progression of changes of acute pancreatitis. No evidence of pancreatic necrosis or fluid collection.  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 No additional dissection or thrombus.      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    Interval Events:  Overnight patient spiked fever to 100.7. Given Tylenol.  Patient remains on 2L NC, saturating mid to high 90s.  Abdominal CTA 9/23 showed progression of changes 2/2 acute pancreatitis, but not necrosis or fluid collection. Celiac artery dissection unchanged.  Insulin drip dc'd, TGs below 500. Endocrine following. Diet changed to clears.     REVIEW OF SYSTEMS:  CONST: no chills  EYES: no pain, no vision changes  ENT: no sore throat, no ear pain, no change in hearing  CV: no chest pain, no leg swelling  RESP: no shortness of breath, no cough  ABD: + abdominal pain, no nausea, no vomiting, no diarrhea  : no dysuria, no flank pain, no hematuria  MSK: no back pain, no extremity pain  NEURO: no headache or additional neurologic complaints  HEME: no easy bleeding  SKIN:  no rash       OBJECTIVE:  ICU Vital Signs Last 24 Hrs  T(C): 37.3 (25 Sep 2020 04:00), Max: 38.7 (25 Sep 2020 00:00)  T(F): 99.2 (25 Sep 2020 04:00), Max: 101.7 (25 Sep 2020 00:00)  HR: 95 (25 Sep 2020 06:00) (83 - 115)  BP: 112/86 (25 Sep 2020 06:00) (101/66 - 150/90)  BP(mean): 95 (25 Sep 2020 06:00) (75 - 107)  ABP: --  ABP(mean): --  RR: 19 (25 Sep 2020 06:00) (17 - 31)  SpO2: 94% (25 Sep 2020 06:00) (91% - 97%)        09-23 @ 07:01  -  09-24 @ 07:00  --------------------------------------------------------  IN: 1452 mL / OUT: 550 mL / NET: 902 mL    09-24 @ 07:01  -  09-25 @ 06:18  --------------------------------------------------------  IN: 3383 mL / OUT: 750 mL / NET: 2633 mL      CAPILLARY BLOOD GLUCOSE      POCT Blood Glucose.: 113 mg/dL (25 Sep 2020 06:01)      PHYSICAL EXAM:  GENERAL: Awake, alert, NAD  HEENT: NC/AT, moist mucous membranes, PERRL, EOMI  LUNGS: CTAB, no wheezes or crackles   CARDIAC: RRR, normal S1/S2. Systolic murmur.  ABDOMEN: RUQ TTP. Soft, normal BS, non distended, no rebound, no guarding  EXT: No edema, no calf tenderness, 2+ DP pulses bilaterally, no deformities.  NEURO: A&Ox3. Moving all extremities. CN II-XII grossly intact.   SKIN: Warm and dry. No rash.  PSYCH: Normal affect.       LINES: 2 peripheral IVs    HOSPITAL MEDICATIONS:  Standing Meds:  aspirin enteric coated 81 milliGRAM(s) Oral daily  atorvastatin 20 milliGRAM(s) Oral at bedtime  bisacodyl 5 milliGRAM(s) Oral at bedtime  chlorhexidine 4% Liquid 1 Application(s) Topical <User Schedule>  dextrose 5% + sodium chloride 0.45%. 1000 milliLiter(s) IV Continuous <Continuous>  enoxaparin Injectable 40 milliGRAM(s) SubCutaneous daily  folic acid 1 milliGRAM(s) Oral daily  influenza   Vaccine 0.5 milliLiter(s) IntraMuscular once  insulin regular Infusion 6 Unit(s)/Hr IV Continuous <Continuous>  multivitamin 1 Tablet(s) Oral daily  omega-3-Acid Ethyl Esters 2 Gram(s) Oral two times a day  thiamine 100 milliGRAM(s) Oral daily      PRN Meds:  acetaminophen   Tablet .. 650 milliGRAM(s) Oral every 6 hours PRN  melatonin 3 milliGRAM(s) Oral at bedtime PRN  oxyCODONE    IR 5 milliGRAM(s) Oral every 4 hours PRN      LABS:                        14.2   11.63 )-----------( 123      ( 25 Sep 2020 04:18 )             43.3     Hgb Trend: 14.2<--, 15.0<--, 15.1<--  09-25    134<L>  |  99  |  7   ----------------------------<  90  3.7   |  21<L>  |  0.78    Ca    8.4      25 Sep 2020 04:18  Phos  2.9     09-25  Mg     1.8     09-25    TPro  5.8<L>  /  Alb  4.1  /  TBili  0.7  /  DBili  x   /  AST  45<H>  /  ALT  21  /  AlkPhos  47  09-23    Creatinine Trend: 0.78<--, 0.73<--, 0.70<--, 0.68<--, 0.60<--, 0.60<--  PT/INR - ( 23 Sep 2020 12:24 )   PT: Test not performed SAMPLE IS GROSSLY LIPEMIC  TRY TO RUN ON ST4...REJECTED SEC;   INR: Test not performed         PTT - ( 23 Sep 2020 12:24 )  PTT:Test not performed NOTIFIED ,B  09/23/20 1354:  APTT previously reported as: Test not performed  SEC SEC  Urinalysis Basic - ( 24 Sep 2020 03:20 )    Color: YELLOW / Appearance: CLEAR / SG: > 1.040 / pH: 6.0  Gluc: 1000 / Ketone: TRACE  / Bili: NEGATIVE / Urobili: NORMAL   Blood: MODERATE / Protein: 200 / Nitrite: NEGATIVE   Leuk Esterase: NEGATIVE / RBC: 6-10 / WBC 3-5   Sq Epi: FEW / Non Sq Epi: x / Bacteria: NEGATIVE        Venous Blood Gas:  09-23 @ 19:46  7.43/35/54/24/91.0  VBG Lactate: 1.5  Venous Blood Gas:  09-23 @ 14:48  7.31/48/26/21/42.9  VBG Lactate: 1.8      MICROBIOLOGY:       RADIOLOGY:  CTA 9/23: Interval progression of changes of acute pancreatitis. No evidence of pancreatic necrosis or fluid collection.  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 No additional dissection or thrombus.      EKG:

## 2020-09-25 NOTE — CHART NOTE - NSCHARTNOTEFT_GEN_A_CORE
MICU course  59yo M w/ [id number] (not on medication) p/w abdominal pain for 1 day a/w pancreatitis likely 2/2 hypertriglyceridemia. In the MICU, patient treated w/ IVF and placed on Insulin gtt. Initial TGs were > 300. Patient taken off insulin gtt when TGs were found to be < 500. 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 Vascular surgery was consulted and recommended ASA and mesenteric duplex. Endocrine was also consulted for uncontrolled T2DM (A1c 11.8, patient non-adherent w/ home metformin). Pt also oliguric, treated w/ continued IVF @ 200/hr for 6 hours on 9/25. Patient was febrile during MICU course, however fever attributed to pancreatitis. Pt w/ leukocytosis (~11K), however non septic appearing. Blood and urine cultures remained negative. Antibiotics were not given; patient monitored. Insulin gtt turned off at 12PM. Pt is stable for transfer to [facility].     Follow up:   [ ] f/u endocrine recs   [ ] f/u mesenteric US duplex  [ ] f/u vascular recs     Tavo Yu, Internal Medicine  MAR [id number], pager [phone]

## 2020-09-26 DIAGNOSIS — E11.9 TYPE 2 DIABETES MELLITUS WITHOUT COMPLICATIONS: ICD-10-CM

## 2020-09-26 DIAGNOSIS — Z29.9 ENCOUNTER FOR PROPHYLACTIC MEASURES, UNSPECIFIED: ICD-10-CM

## 2020-09-26 DIAGNOSIS — K85.90 ACUTE PANCREATITIS WITHOUT NECROSIS OR INFECTION, UNSPECIFIED: ICD-10-CM

## 2020-09-26 DIAGNOSIS — Z02.9 ENCOUNTER FOR ADMINISTRATIVE EXAMINATIONS, UNSPECIFIED: ICD-10-CM

## 2020-09-26 DIAGNOSIS — E83.39 OTHER DISORDERS OF PHOSPHORUS METABOLISM: ICD-10-CM

## 2020-09-26 DIAGNOSIS — D69.6 THROMBOCYTOPENIA, UNSPECIFIED: ICD-10-CM

## 2020-09-26 DIAGNOSIS — I77.79 DISSECTION OF OTHER SPECIFIED ARTERY: ICD-10-CM

## 2020-09-26 LAB
ALBUMIN SERPL ELPH-MCNC: 3.2 G/DL — LOW (ref 3.3–5)
ALP SERPL-CCNC: 48 U/L — SIGNIFICANT CHANGE UP (ref 40–120)
ALT FLD-CCNC: 17 U/L — SIGNIFICANT CHANGE UP (ref 4–41)
ANION GAP SERPL CALC-SCNC: 14 MMO/L — SIGNIFICANT CHANGE UP (ref 7–14)
AST SERPL-CCNC: 16 U/L — SIGNIFICANT CHANGE UP (ref 4–40)
BASOPHILS # BLD AUTO: 0.02 K/UL — SIGNIFICANT CHANGE UP (ref 0–0.2)
BASOPHILS NFR BLD AUTO: 0.2 % — SIGNIFICANT CHANGE UP (ref 0–2)
BILIRUB SERPL-MCNC: 1.5 MG/DL — HIGH (ref 0.2–1.2)
BUN SERPL-MCNC: 9 MG/DL — SIGNIFICANT CHANGE UP (ref 7–23)
CALCIUM SERPL-MCNC: 8.4 MG/DL — SIGNIFICANT CHANGE UP (ref 8.4–10.5)
CHLORIDE SERPL-SCNC: 97 MMOL/L — LOW (ref 98–107)
CO2 SERPL-SCNC: 21 MMOL/L — LOW (ref 22–31)
CREAT SERPL-MCNC: 0.8 MG/DL — SIGNIFICANT CHANGE UP (ref 0.5–1.3)
EOSINOPHIL # BLD AUTO: 0.1 K/UL — SIGNIFICANT CHANGE UP (ref 0–0.5)
EOSINOPHIL NFR BLD AUTO: 1.2 % — SIGNIFICANT CHANGE UP (ref 0–6)
GLUCOSE BLDC GLUCOMTR-MCNC: 137 MG/DL — HIGH (ref 70–99)
GLUCOSE BLDC GLUCOMTR-MCNC: 141 MG/DL — HIGH (ref 70–99)
GLUCOSE BLDC GLUCOMTR-MCNC: 160 MG/DL — HIGH (ref 70–99)
GLUCOSE BLDC GLUCOMTR-MCNC: 160 MG/DL — HIGH (ref 70–99)
GLUCOSE SERPL-MCNC: 174 MG/DL — HIGH (ref 70–99)
HCT VFR BLD CALC: 40.4 % — SIGNIFICANT CHANGE UP (ref 39–50)
HGB BLD-MCNC: 13.2 G/DL — SIGNIFICANT CHANGE UP (ref 13–17)
IMM GRANULOCYTES NFR BLD AUTO: 0.4 % — SIGNIFICANT CHANGE UP (ref 0–1.5)
LYMPHOCYTES # BLD AUTO: 0.81 K/UL — LOW (ref 1–3.3)
LYMPHOCYTES # BLD AUTO: 9.9 % — LOW (ref 13–44)
MAGNESIUM SERPL-MCNC: 2.1 MG/DL — SIGNIFICANT CHANGE UP (ref 1.6–2.6)
MCHC RBC-ENTMCNC: 29 PG — SIGNIFICANT CHANGE UP (ref 27–34)
MCHC RBC-ENTMCNC: 32.7 % — SIGNIFICANT CHANGE UP (ref 32–36)
MCV RBC AUTO: 88.8 FL — SIGNIFICANT CHANGE UP (ref 80–100)
MONOCYTES # BLD AUTO: 0.86 K/UL — SIGNIFICANT CHANGE UP (ref 0–0.9)
MONOCYTES NFR BLD AUTO: 10.5 % — SIGNIFICANT CHANGE UP (ref 2–14)
NEUTROPHILS # BLD AUTO: 6.39 K/UL — SIGNIFICANT CHANGE UP (ref 1.8–7.4)
NEUTROPHILS NFR BLD AUTO: 77.8 % — HIGH (ref 43–77)
NRBC # FLD: 0 K/UL — SIGNIFICANT CHANGE UP (ref 0–0)
PHOSPHATE SERPL-MCNC: 1.8 MG/DL — LOW (ref 2.5–4.5)
PLATELET # BLD AUTO: 111 K/UL — LOW (ref 150–400)
PMV BLD: 11.5 FL — SIGNIFICANT CHANGE UP (ref 7–13)
POTASSIUM SERPL-MCNC: 3.5 MMOL/L — SIGNIFICANT CHANGE UP (ref 3.5–5.3)
POTASSIUM SERPL-SCNC: 3.5 MMOL/L — SIGNIFICANT CHANGE UP (ref 3.5–5.3)
PROT SERPL-MCNC: 6.6 G/DL — SIGNIFICANT CHANGE UP (ref 6–8.3)
RBC # BLD: 4.55 M/UL — SIGNIFICANT CHANGE UP (ref 4.2–5.8)
RBC # FLD: 13 % — SIGNIFICANT CHANGE UP (ref 10.3–14.5)
SODIUM SERPL-SCNC: 132 MMOL/L — LOW (ref 135–145)
WBC # BLD: 8.21 K/UL — SIGNIFICANT CHANGE UP (ref 3.8–10.5)
WBC # FLD AUTO: 8.21 K/UL — SIGNIFICANT CHANGE UP (ref 3.8–10.5)

## 2020-09-26 PROCEDURE — 99233 SBSQ HOSP IP/OBS HIGH 50: CPT

## 2020-09-26 RX ORDER — ATORVASTATIN CALCIUM 80 MG/1
80 TABLET, FILM COATED ORAL AT BEDTIME
Refills: 0 | Status: DISCONTINUED | OUTPATIENT
Start: 2020-09-26 | End: 2020-09-27

## 2020-09-26 RX ORDER — MORPHINE SULFATE 50 MG/1
2 CAPSULE, EXTENDED RELEASE ORAL ONCE
Refills: 0 | Status: DISCONTINUED | OUTPATIENT
Start: 2020-09-26 | End: 2020-09-26

## 2020-09-26 RX ORDER — MORPHINE SULFATE 50 MG/1
1 CAPSULE, EXTENDED RELEASE ORAL ONCE
Refills: 0 | Status: DISCONTINUED | OUTPATIENT
Start: 2020-09-26 | End: 2020-09-26

## 2020-09-26 RX ORDER — ACETAMINOPHEN 500 MG
650 TABLET ORAL EVERY 6 HOURS
Refills: 0 | Status: DISCONTINUED | OUTPATIENT
Start: 2020-09-26 | End: 2020-09-27

## 2020-09-26 RX ORDER — SENNA PLUS 8.6 MG/1
2 TABLET ORAL AT BEDTIME
Refills: 0 | Status: DISCONTINUED | OUTPATIENT
Start: 2020-09-26 | End: 2020-09-27

## 2020-09-26 RX ADMIN — OXYCODONE HYDROCHLORIDE 5 MILLIGRAM(S): 5 TABLET ORAL at 06:11

## 2020-09-26 RX ADMIN — Medication 5 MILLIGRAM(S): at 22:16

## 2020-09-26 RX ADMIN — Medication 2 GRAM(S): at 06:11

## 2020-09-26 RX ADMIN — Medication 100 MILLIGRAM(S): at 13:07

## 2020-09-26 RX ADMIN — Medication 6 UNIT(S): at 09:12

## 2020-09-26 RX ADMIN — ENOXAPARIN SODIUM 40 MILLIGRAM(S): 100 INJECTION SUBCUTANEOUS at 13:07

## 2020-09-26 RX ADMIN — Medication 2: at 09:12

## 2020-09-26 RX ADMIN — Medication 63.75 MILLIMOLE(S): at 09:13

## 2020-09-26 RX ADMIN — Medication 1 MILLIGRAM(S): at 13:07

## 2020-09-26 RX ADMIN — MORPHINE SULFATE 1 MILLIGRAM(S): 50 CAPSULE, EXTENDED RELEASE ORAL at 12:08

## 2020-09-26 RX ADMIN — Medication 6 UNIT(S): at 18:08

## 2020-09-26 RX ADMIN — OXYCODONE HYDROCHLORIDE 5 MILLIGRAM(S): 5 TABLET ORAL at 06:41

## 2020-09-26 RX ADMIN — MORPHINE SULFATE 1 MILLIGRAM(S): 50 CAPSULE, EXTENDED RELEASE ORAL at 11:38

## 2020-09-26 RX ADMIN — MORPHINE SULFATE 2 MILLIGRAM(S): 50 CAPSULE, EXTENDED RELEASE ORAL at 13:01

## 2020-09-26 RX ADMIN — Medication 81 MILLIGRAM(S): at 13:07

## 2020-09-26 RX ADMIN — Medication 650 MILLIGRAM(S): at 22:45

## 2020-09-26 RX ADMIN — Medication 145 MILLIGRAM(S): at 13:07

## 2020-09-26 RX ADMIN — Medication 6 UNIT(S): at 12:35

## 2020-09-26 RX ADMIN — ATORVASTATIN CALCIUM 80 MILLIGRAM(S): 80 TABLET, FILM COATED ORAL at 22:17

## 2020-09-26 RX ADMIN — INSULIN GLARGINE 20 UNIT(S): 100 INJECTION, SOLUTION SUBCUTANEOUS at 16:23

## 2020-09-26 RX ADMIN — Medication 650 MILLIGRAM(S): at 22:15

## 2020-09-26 RX ADMIN — Medication 2 GRAM(S): at 18:07

## 2020-09-26 RX ADMIN — Medication 1 TABLET(S): at 13:06

## 2020-09-26 RX ADMIN — MORPHINE SULFATE 2 MILLIGRAM(S): 50 CAPSULE, EXTENDED RELEASE ORAL at 12:31

## 2020-09-26 RX ADMIN — Medication 2: at 12:35

## 2020-09-26 NOTE — PROGRESS NOTE ADULT - PROBLEM SELECTOR PLAN 4
Incidentally found on CT  -Vascular on board, will continue to manage as outpatient  -Mesenteric US ahead of discharge

## 2020-09-26 NOTE — PROGRESS NOTE ADULT - PROBLEM SELECTOR PLAN 3
Nonadherent to home metformin, A1c 11.8  -Endo on board, c/w Lantus 20u QHS + Humalog 6u TIDAC + ISS

## 2020-09-26 NOTE — PROGRESS NOTE ADULT - PROBLEM SELECTOR PLAN 8
Plan: Transitions of Care Status:  1.  Name of PCP:   2.  PCP Contacted on Admission: [ ] Y    [ ] N    3.  PCP contacted at Discharge: [ ] Y    [ ] N    [ ] N/A  4.  Post-Discharge Appointment Date and Location:  5.  Summary of Handoff given to PCP:

## 2020-09-26 NOTE — PROGRESS NOTE ADULT - ASSESSMENT
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

## 2020-09-26 NOTE — PROGRESS NOTE ADULT - SUBJECTIVE AND OBJECTIVE BOX
PROGRESS NOTE:   Authored by Bobo Caceres MD  Pager: [phone]; [facility] [phone]    Patient is a 60y old  Male who presents with a chief complaint of Pancreatitis 2/2 hypertriglyceridemia (25 Sep 2020 16:21)      SUBJECTIVE / OVERNIGHT EVENTS: No acute events overnight. This morning, patient reports 5/10 abdominal pain, improved from 9/10 yesterday. Is concerned that he started eating too much and will be more careful to eat more moderately. Denies nausea and vomiting. Denies fever, chills, and diaphoresis.    ADDITIONAL REVIEW OF SYSTEMS: Denies chest pain, dyspnea, headache, vision change, diarrhea, constipation, hematochezia, melena, dysuria, and weakness.     MEDICATIONS  (STANDING):  aspirin enteric coated 81 milliGRAM(s) Oral daily  atorvastatin 20 milliGRAM(s) Oral at bedtime  bisacodyl 5 milliGRAM(s) Oral at bedtime  dextrose 5%. 1000 milliLiter(s) (50 mL/Hr) IV Continuous <Continuous>  dextrose 50% Injectable 12.5 Gram(s) IV Push once  dextrose 50% Injectable 25 Gram(s) IV Push once  dextrose 50% Injectable 25 Gram(s) IV Push once  enoxaparin Injectable 40 milliGRAM(s) SubCutaneous daily  fenofibrate Tablet 145 milliGRAM(s) Oral daily  folic acid 1 milliGRAM(s) Oral daily  influenza   Vaccine 0.5 milliLiter(s) IntraMuscular once  insulin glargine Injectable (LANTUS) 20 Unit(s) SubCutaneous <User Schedule>  insulin lispro (HumaLOG) corrective regimen sliding scale   SubCutaneous three times a day before meals  insulin lispro (HumaLOG) corrective regimen sliding scale   SubCutaneous at bedtime  insulin lispro Injectable (HumaLOG) 6 Unit(s) SubCutaneous three times a day before meals  multivitamin 1 Tablet(s) Oral daily  omega-3-Acid Ethyl Esters 2 Gram(s) Oral two times a day  thiamine 100 milliGRAM(s) Oral daily    MEDICATIONS  (PRN):  acetaminophen   Tablet .. 650 milliGRAM(s) Oral every 6 hours PRN Temp greater or equal to 38C (100.4F), Mild Pain (1 - 3)  aluminum hydroxide/magnesium hydroxide/simethicone Suspension 30 milliLiter(s) Oral every 4 hours PRN Dyspepsia  dextrose 40% Gel 15 Gram(s) Oral once PRN Blood Glucose LESS THAN 70 milliGRAM(s)/deciliter  glucagon  Injectable 1 milliGRAM(s) IntraMuscular once PRN Glucose LESS THAN 70 milligrams/deciliter  melatonin 3 milliGRAM(s) Oral at bedtime PRN Insomnia  oxyCODONE    IR 5 milliGRAM(s) Oral every 4 hours PRN Severe Pain (7 - 10)      CAPILLARY BLOOD GLUCOSE      POCT Blood Glucose.: 160 mg/dL (26 Sep 2020 08:36)  POCT Blood Glucose.: 229 mg/dL (25 Sep 2020 22:08)  POCT Blood Glucose.: 197 mg/dL (25 Sep 2020 12:09)  POCT Blood Glucose.: 162 mg/dL (25 Sep 2020 10:52)    I&O's Summary    25 Sep 2020 07:01  -  26 Sep 2020 07:00  --------------------------------------------------------  IN: 1693 mL / OUT: 900 mL / NET: 793 mL        PHYSICAL EXAM:  Vital Signs Last 24 Hrs  T(C): 37.4 (26 Sep 2020 10:00), Max: 38.3 (25 Sep 2020 20:00)  T(F): 99.4 (26 Sep 2020 10:00), Max: 101 (25 Sep 2020 20:00)  HR: 92 (26 Sep 2020 10:00) (84 - 104)  BP: 125/82 (26 Sep 2020 10:00) (104/89 - 139/94)  BP(mean): 94 (25 Sep 2020 20:00) (93 - 111)  RR: 28 (26 Sep 2020 10:00) (17 - 36)  SpO2: 94% (26 Sep 2020 10:00) (93% - 96%)    GENERAL: No acute distress, well-developed  HEAD:  Atraumatic, Normocephalic  CHEST/LUNG: CTAB; No wheezes, rales, or rhonchi  HEART: Regular rate and rhythm; No murmurs, rubs, or gallops  ABDOMEN: Soft, diffuse tenderness to palpation, non-distended; increased bowel sounds, no organomegaly  EXTREMITIES:  2+ peripheral pulses b/l, No clubbing, cyanosis, or edema  NEUROLOGY: A&O x 3, no focal deficits  SKIN: No rashes or lesions    LABS:                        13.2   8.21  )-----------( 111      ( 26 Sep 2020 06:57 )             40.4     09-26    132<L>  |  97<L>  |  9   ----------------------------<  174<H>  3.5   |  21<L>  |  0.80    Ca    8.4      26 Sep 2020 06:57  Phos  1.8     09-26  Mg     2.1     09-26    TPro  6.6  /  Alb  3.2<L>  /  TBili  1.5<H>  /  DBili  x   /  AST  16  /  ALT  17  /  AlkPhos  48  09-26              Culture - Blood (collected 24 Sep 2020 06:40)  Source: .Blood Blood-Peripheral  Preliminary Report (25 Sep 2020 07:01):    No growth to date.    Culture - Urine (collected 24 Sep 2020 03:15)  Source: .Urine Clean Catch (Midstream)  Final Report (25 Sep 2020 09:20):    No growth        RADIOLOGY & ADDITIONAL TESTS:    < from: CT Angio Abdomen and Pelvis w/ IV Cont (09.24.20 @ 20:13) >  IMPRESSION:  Interval progression of changes of acute pancreatitis. No evidence of pancreatic necrosis or fluid collection.  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 No additional dissection or thrombus.    < end of copied text >    < from: Transthoracic Echocardiogram (09.25.20 @ 14:41) >  CONCLUSIONS:  1. Normal mitral valve. Minimal mitral regurgitation.  2. Normal left ventricular internal dimensions and wall  thicknesses.  3. Normal left ventricular systolic function. No segmental  wall motion abnormalities.  4. Normal right ventricular size and function.  ---------------------------------------------------------------------    < end of copied text >      Results Reviewed: y  Imaging Personally Reviewed: y  Electrocardiogram Personally Reviewed: y    COORDINATION OF CARE:  Care Discussed with Consultants/Other Providers [Y/N]: y  Prior or Outpatient Records Reviewed [Y/N]: y   PROGRESS NOTE:   Authored by Bobo Caceres MD  Pager: [phone]; [facility] [phone]    Patient is a 60y old  Male who presents with a chief complaint of Pancreatitis 2/2 hypertriglyceridemia (25 Sep 2020 16:21)      SUBJECTIVE / OVERNIGHT EVENTS: No acute events overnight. This morning, patient reports 5/10 abdominal pain radiating to his back, improved from 9/10 yesterday. Is concerned that he started eating too much and will be more careful to eat more moderately. Denies nausea and vomiting. Denies fever, chills, and diaphoresis.    ADDITIONAL REVIEW OF SYSTEMS: Denies chest pain, dyspnea, headache, vision change, diarrhea, constipation, hematochezia, melena, dysuria, and weakness.     MEDICATIONS  (STANDING):  aspirin enteric coated 81 milliGRAM(s) Oral daily  atorvastatin 20 milliGRAM(s) Oral at bedtime  bisacodyl 5 milliGRAM(s) Oral at bedtime  dextrose 5%. 1000 milliLiter(s) (50 mL/Hr) IV Continuous <Continuous>  dextrose 50% Injectable 12.5 Gram(s) IV Push once  dextrose 50% Injectable 25 Gram(s) IV Push once  dextrose 50% Injectable 25 Gram(s) IV Push once  enoxaparin Injectable 40 milliGRAM(s) SubCutaneous daily  fenofibrate Tablet 145 milliGRAM(s) Oral daily  folic acid 1 milliGRAM(s) Oral daily  influenza   Vaccine 0.5 milliLiter(s) IntraMuscular once  insulin glargine Injectable (LANTUS) 20 Unit(s) SubCutaneous <User Schedule>  insulin lispro (HumaLOG) corrective regimen sliding scale   SubCutaneous three times a day before meals  insulin lispro (HumaLOG) corrective regimen sliding scale   SubCutaneous at bedtime  insulin lispro Injectable (HumaLOG) 6 Unit(s) SubCutaneous three times a day before meals  multivitamin 1 Tablet(s) Oral daily  omega-3-Acid Ethyl Esters 2 Gram(s) Oral two times a day  thiamine 100 milliGRAM(s) Oral daily    MEDICATIONS  (PRN):  acetaminophen   Tablet .. 650 milliGRAM(s) Oral every 6 hours PRN Temp greater or equal to 38C (100.4F), Mild Pain (1 - 3)  aluminum hydroxide/magnesium hydroxide/simethicone Suspension 30 milliLiter(s) Oral every 4 hours PRN Dyspepsia  dextrose 40% Gel 15 Gram(s) Oral once PRN Blood Glucose LESS THAN 70 milliGRAM(s)/deciliter  glucagon  Injectable 1 milliGRAM(s) IntraMuscular once PRN Glucose LESS THAN 70 milligrams/deciliter  melatonin 3 milliGRAM(s) Oral at bedtime PRN Insomnia  oxyCODONE    IR 5 milliGRAM(s) Oral every 4 hours PRN Severe Pain (7 - 10)      CAPILLARY BLOOD GLUCOSE      POCT Blood Glucose.: 160 mg/dL (26 Sep 2020 08:36)  POCT Blood Glucose.: 229 mg/dL (25 Sep 2020 22:08)  POCT Blood Glucose.: 197 mg/dL (25 Sep 2020 12:09)  POCT Blood Glucose.: 162 mg/dL (25 Sep 2020 10:52)    I&O's Summary    25 Sep 2020 07:01  -  26 Sep 2020 07:00  --------------------------------------------------------  IN: 1693 mL / OUT: 900 mL / NET: 793 mL        PHYSICAL EXAM:  Vital Signs Last 24 Hrs  T(C): 37.4 (26 Sep 2020 10:00), Max: 38.3 (25 Sep 2020 20:00)  T(F): 99.4 (26 Sep 2020 10:00), Max: 101 (25 Sep 2020 20:00)  HR: 92 (26 Sep 2020 10:00) (84 - 104)  BP: 125/82 (26 Sep 2020 10:00) (104/89 - 139/94)  BP(mean): 94 (25 Sep 2020 20:00) (93 - 111)  RR: 28 (26 Sep 2020 10:00) (17 - 36)  SpO2: 94% (26 Sep 2020 10:00) (93% - 96%)    GENERAL: No acute distress, well-developed  HEAD:  Atraumatic, Normocephalic  CHEST/LUNG: CTAB; No wheezes, rales, or rhonchi  HEART: Regular rate and rhythm; No murmurs, rubs, or gallops  ABDOMEN: Soft, diffuse tenderness to palpation, non-distended; increased bowel sounds, no organomegaly  EXTREMITIES:  2+ peripheral pulses b/l, No clubbing, cyanosis, or edema  NEUROLOGY: A&O x 3, no focal deficits  SKIN: No rashes or lesions    LABS:                        13.2   8.21  )-----------( 111      ( 26 Sep 2020 06:57 )             40.4     09-26    132<L>  |  97<L>  |  9   ----------------------------<  174<H>  3.5   |  21<L>  |  0.80    Ca    8.4      26 Sep 2020 06:57  Phos  1.8     09-26  Mg     2.1     09-26    TPro  6.6  /  Alb  3.2<L>  /  TBili  1.5<H>  /  DBili  x   /  AST  16  /  ALT  17  /  AlkPhos  48  09-26              Culture - Blood (collected 24 Sep 2020 06:40)  Source: .Blood Blood-Peripheral  Preliminary Report (25 Sep 2020 07:01):    No growth to date.    Culture - Urine (collected 24 Sep 2020 03:15)  Source: .Urine Clean Catch (Midstream)  Final Report (25 Sep 2020 09:20):    No growth        RADIOLOGY & ADDITIONAL TESTS:    < from: CT Angio Abdomen and Pelvis w/ IV Cont (09.24.20 @ 20:13) >  IMPRESSION:  Interval progression of changes of acute pancreatitis. No evidence of pancreatic necrosis or fluid collection.  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 No additional dissection or thrombus.    < end of copied text >    < from: Transthoracic Echocardiogram (09.25.20 @ 14:41) >  CONCLUSIONS:  1. Normal mitral valve. Minimal mitral regurgitation.  2. Normal left ventricular internal dimensions and wall  thicknesses.  3. Normal left ventricular systolic function. No segmental  wall motion abnormalities.  4. Normal right ventricular size and function.  ---------------------------------------------------------------------    < end of copied text >      Results Reviewed: y  Imaging Personally Reviewed: y  Electrocardiogram Personally Reviewed: y    COORDINATION OF CARE:  Care Discussed with Consultants/Other Providers [Y/N]: y  Prior or Outpatient Records Reviewed [Y/N]: y

## 2020-09-26 NOTE — PROGRESS NOTE ADULT - PROBLEM SELECTOR PLAN 1
Presented with 10/10 abdominal pain associated with nausea, found to have TG 3800 and Lipase 2900, CT c/w pancreatitis, likely 2/2 hypertriglyceridemia  -In MICU received insulin gtt and IVF x2 days  -Since being downgraded, patient tolerating PO and pain improving  -Continue to advance diet as tolerated Presented with 10/10 abdominal pain associated with nausea, found to have TG 3800 and Lipase 2900, CT c/w pancreatitis, likely 2/2 hypertriglyceridemia  -In MICU received insulin gtt and IVF x2 days  -Since being downgraded, patient tolerating PO and pain improving  -Continue to advance diet as tolerated, pain control as needed

## 2020-09-26 NOTE — PROGRESS NOTE ADULT - PROBLEM SELECTOR PLAN 2
TG 3800 on admission, likely etiology for acute pancreatitis  -Endo on board, started on Lipitor 20mg QHS, Fenofibrate 145mg daily, Lovaza 2g daily  -Downgraded from MICU yesterday as TG = 363  -Continue regimen as above TG 3800 on admission, likely etiology for acute pancreatitis  -Endo on board, started on Lipitor 20mg QHS, Fenofibrate 145mg daily, Lovaza 2g daily  [facility]  -Increase to Atorvastatin 80mg QHS as ASCVD Risk 82.4%   -Continue Fenofibrate 145mg daily and Lovaza 2g BID

## 2020-09-26 NOTE — PROGRESS NOTE ADULT - ASSESSMENT
60M w/ pmh DM (not on medication) presenting with abdominal pain 2/2 pancreatitis. CT demonstrating celiac artery dissection.     - c/w ASA/statin  - CTA reviewed  -please obtain mesenteric duplex to evaluate if celiac artery dissection can be seen on duplex, this will function as a baseline duplex for follow up as outpatient  -no urgent vascular surgery intervention necessary at this time    Discussed with vascular surgery fellows      Vascular Surgery (C Team Surgery)  [phone] with any questions

## 2020-09-26 NOTE — PROGRESS NOTE ADULT - SUBJECTIVE AND OBJECTIVE BOX
Vascular Surgery Progress Note     S: Patient resting comfortably on morning rounds. Pain well-controlled currently.      Physical Exam:    General: NAD, AOx3  LE:   Pulses exam:     T(C): 37.4 (09-26-20 @ 10:00), Max: 38.3 (09-25-20 @ 20:00)  HR: 92 (09-26-20 @ 10:00) (84 - 104)  BP: 125/82 (09-26-20 @ 10:00) (104/89 - 139/94)  RR: 28 (09-26-20 @ 10:00) (17 - 36)  SpO2: 94% (09-26-20 @ 10:00) (93% - 96%)    09-25-20 @ 07:01  -  09-26-20 @ 07:00  --------------------------------------------------------  IN: 1693 mL / OUT: 900 mL / NET: 793 mL        LABS:                        13.2   8.21  )-----------( 111      ( 26 Sep 2020 06:57 )             40.4     09-26    132<L>  |  97<L>  |  9   ----------------------------<  174<H>  3.5   |  21<L>  |  0.80    Ca    8.4      26 Sep 2020 06:57  Phos  1.8     09-26  Mg     2.1     09-26    TPro  6.6  /  Alb  3.2<L>  /  TBili  1.5<H>  /  DBili  x   /  AST  16  /  ALT  17  /  AlkPhos  48  09-26         Vascular Surgery Progress Note     S: Patient resting comfortably on morning rounds. Pain well-controlled currently.    says ab pain much improved since admission  +/+, denies melena/hematochezia  tolerating liquids, denies n/v    Physical Exam:    General: NAD, AOx3  Abd: softly distended, nontender  Ext: moving all extremities    T(C): 37.4 (09-26-20 @ 10:00), Max: 38.3 (09-25-20 @ 20:00)  HR: 92 (09-26-20 @ 10:00) (84 - 104)  BP: 125/82 (09-26-20 @ 10:00) (104/89 - 139/94)  RR: 28 (09-26-20 @ 10:00) (17 - 36)  SpO2: 94% (09-26-20 @ 10:00) (93% - 96%)    09-25-20 @ 07:01  -  09-26-20 @ 07:00  --------------------------------------------------------  IN: 1693 mL / OUT: 900 mL / NET: 793 mL        LABS:                        13.2   8.21  )-----------( 111      ( 26 Sep 2020 06:57 )             40.4     09-26    132<L>  |  97<L>  |  9   ----------------------------<  174<H>  3.5   |  21<L>  |  0.80    Ca    8.4      26 Sep 2020 06:57  Phos  1.8     09-26  Mg     2.1     09-26    TPro  6.6  /  Alb  3.2<L>  /  TBili  1.5<H>  /  DBili  x   /  AST  16  /  ALT  17  /  AlkPhos  48  09-26

## 2020-09-27 ENCOUNTER — TRANSCRIPTION ENCOUNTER (OUTPATIENT)
Age: 60
End: 2020-09-27

## 2020-09-27 VITALS
SYSTOLIC BLOOD PRESSURE: 135 MMHG | RESPIRATION RATE: 20 BRPM | TEMPERATURE: 99 F | HEART RATE: 85 BPM | OXYGEN SATURATION: 95 % | DIASTOLIC BLOOD PRESSURE: 80 MMHG

## 2020-09-27 DIAGNOSIS — E87.6 HYPOKALEMIA: ICD-10-CM

## 2020-09-27 LAB
ALBUMIN SERPL ELPH-MCNC: 3.4 G/DL — SIGNIFICANT CHANGE UP (ref 3.3–5)
ALP SERPL-CCNC: 59 U/L — SIGNIFICANT CHANGE UP (ref 40–120)
ALT FLD-CCNC: 15 U/L — SIGNIFICANT CHANGE UP (ref 4–41)
ANION GAP SERPL CALC-SCNC: 19 MMO/L — HIGH (ref 7–14)
AST SERPL-CCNC: 17 U/L — SIGNIFICANT CHANGE UP (ref 4–40)
BASOPHILS # BLD AUTO: 0.04 K/UL — SIGNIFICANT CHANGE UP (ref 0–0.2)
BASOPHILS NFR BLD AUTO: 0.4 % — SIGNIFICANT CHANGE UP (ref 0–2)
BILIRUB SERPL-MCNC: 1.1 MG/DL — SIGNIFICANT CHANGE UP (ref 0.2–1.2)
BUN SERPL-MCNC: 12 MG/DL — SIGNIFICANT CHANGE UP (ref 7–23)
CALCIUM SERPL-MCNC: 8.7 MG/DL — SIGNIFICANT CHANGE UP (ref 8.4–10.5)
CHLORIDE SERPL-SCNC: 93 MMOL/L — LOW (ref 98–107)
CO2 SERPL-SCNC: 20 MMOL/L — LOW (ref 22–31)
CREAT SERPL-MCNC: 0.78 MG/DL — SIGNIFICANT CHANGE UP (ref 0.5–1.3)
EOSINOPHIL # BLD AUTO: 0.18 K/UL — SIGNIFICANT CHANGE UP (ref 0–0.5)
EOSINOPHIL NFR BLD AUTO: 1.9 % — SIGNIFICANT CHANGE UP (ref 0–6)
GLUCOSE BLDC GLUCOMTR-MCNC: 152 MG/DL — HIGH (ref 70–99)
GLUCOSE SERPL-MCNC: 150 MG/DL — HIGH (ref 70–99)
HCT VFR BLD CALC: 40.2 % — SIGNIFICANT CHANGE UP (ref 39–50)
HGB BLD-MCNC: 13.1 G/DL — SIGNIFICANT CHANGE UP (ref 13–17)
IMM GRANULOCYTES NFR BLD AUTO: 0.5 % — SIGNIFICANT CHANGE UP (ref 0–1.5)
LYMPHOCYTES # BLD AUTO: 0.95 K/UL — LOW (ref 1–3.3)
LYMPHOCYTES # BLD AUTO: 9.9 % — LOW (ref 13–44)
MAGNESIUM SERPL-MCNC: 1.9 MG/DL — SIGNIFICANT CHANGE UP (ref 1.6–2.6)
MCHC RBC-ENTMCNC: 28.9 PG — SIGNIFICANT CHANGE UP (ref 27–34)
MCHC RBC-ENTMCNC: 32.6 % — SIGNIFICANT CHANGE UP (ref 32–36)
MCV RBC AUTO: 88.5 FL — SIGNIFICANT CHANGE UP (ref 80–100)
MONOCYTES # BLD AUTO: 1.18 K/UL — HIGH (ref 0–0.9)
MONOCYTES NFR BLD AUTO: 12.3 % — SIGNIFICANT CHANGE UP (ref 2–14)
NEUTROPHILS # BLD AUTO: 7.23 K/UL — SIGNIFICANT CHANGE UP (ref 1.8–7.4)
NEUTROPHILS NFR BLD AUTO: 75 % — SIGNIFICANT CHANGE UP (ref 43–77)
NRBC # FLD: 0 K/UL — SIGNIFICANT CHANGE UP (ref 0–0)
PHOSPHATE SERPL-MCNC: 2.5 MG/DL — SIGNIFICANT CHANGE UP (ref 2.5–4.5)
PLATELET # BLD AUTO: 128 K/UL — LOW (ref 150–400)
PMV BLD: 11.7 FL — SIGNIFICANT CHANGE UP (ref 7–13)
POTASSIUM SERPL-MCNC: 3.2 MMOL/L — LOW (ref 3.5–5.3)
POTASSIUM SERPL-SCNC: 3.2 MMOL/L — LOW (ref 3.5–5.3)
PROT SERPL-MCNC: 6.8 G/DL — SIGNIFICANT CHANGE UP (ref 6–8.3)
RBC # BLD: 4.54 M/UL — SIGNIFICANT CHANGE UP (ref 4.2–5.8)
RBC # FLD: 12.8 % — SIGNIFICANT CHANGE UP (ref 10.3–14.5)
SODIUM SERPL-SCNC: 132 MMOL/L — LOW (ref 135–145)
TRIGL SERPL-MCNC: 225 MG/DL — HIGH (ref 10–149)
WBC # BLD: 9.63 K/UL — SIGNIFICANT CHANGE UP (ref 3.8–10.5)
WBC # FLD AUTO: 9.63 K/UL — SIGNIFICANT CHANGE UP (ref 3.8–10.5)

## 2020-09-27 PROCEDURE — 99239 HOSP IP/OBS DSCHRG MGMT >30: CPT

## 2020-09-27 RX ORDER — FENOFIBRATE,MICRONIZED 130 MG
1 CAPSULE ORAL
Qty: 30 | Refills: 0
Start: 2020-09-27 | End: 2020-10-26

## 2020-09-27 RX ORDER — METFORMIN HYDROCHLORIDE 850 MG/1
1 TABLET ORAL
Qty: 98 | Refills: 0
Start: 2020-09-27 | End: 2020-10-26

## 2020-09-27 RX ORDER — OMEGA-3 ACID ETHYL ESTERS 1 G
2 CAPSULE ORAL
Qty: 0 | Refills: 0 | DISCHARGE
Start: 2020-09-27

## 2020-09-27 RX ORDER — METFORMIN HYDROCHLORIDE 850 MG/1
0 TABLET ORAL
Qty: 0 | Refills: 0 | DISCHARGE

## 2020-09-27 RX ORDER — FOLIC ACID 0.8 MG
1 TABLET ORAL
Qty: 0 | Refills: 0 | DISCHARGE
Start: 2020-09-27

## 2020-09-27 RX ORDER — ATORVASTATIN CALCIUM 80 MG/1
1 TABLET, FILM COATED ORAL
Qty: 0 | Refills: 0 | DISCHARGE
Start: 2020-09-27

## 2020-09-27 RX ORDER — POTASSIUM CHLORIDE 20 MEQ
40 PACKET (EA) ORAL ONCE
Refills: 0 | Status: COMPLETED | OUTPATIENT
Start: 2020-09-27 | End: 2020-09-27

## 2020-09-27 RX ORDER — ISOPROPYL ALCOHOL, BENZOCAINE .7; .06 ML/ML; ML/ML
1 SWAB TOPICAL
Qty: 100 | Refills: 1
Start: 2020-09-27 | End: 2020-11-15

## 2020-09-27 RX ORDER — INSULIN GLARGINE 100 [IU]/ML
20 INJECTION, SOLUTION SUBCUTANEOUS
Qty: 3 | Refills: 0
Start: 2020-09-27

## 2020-09-27 RX ORDER — INSULIN GLARGINE 100 [IU]/ML
20 INJECTION, SOLUTION SUBCUTANEOUS
Qty: 5 | Refills: 0
Start: 2020-09-27

## 2020-09-27 RX ORDER — OMEGA-3 ACID ETHYL ESTERS 1 G
2 CAPSULE ORAL
Qty: 120 | Refills: 0
Start: 2020-09-27 | End: 2020-10-26

## 2020-09-27 RX ORDER — FENOFIBRATE,MICRONIZED 130 MG
1 CAPSULE ORAL
Qty: 0 | Refills: 0 | DISCHARGE
Start: 2020-09-27

## 2020-09-27 RX ORDER — ASPIRIN/CALCIUM CARB/MAGNESIUM 324 MG
1 TABLET ORAL
Qty: 0 | Refills: 0 | DISCHARGE
Start: 2020-09-27

## 2020-09-27 RX ORDER — ASPIRIN/CALCIUM CARB/MAGNESIUM 324 MG
1 TABLET ORAL
Qty: 30 | Refills: 0
Start: 2020-09-27 | End: 2020-10-26

## 2020-09-27 RX ORDER — ATORVASTATIN CALCIUM 80 MG/1
1 TABLET, FILM COATED ORAL
Qty: 30 | Refills: 0
Start: 2020-09-27 | End: 2020-10-26

## 2020-09-27 RX ORDER — THIAMINE MONONITRATE (VIT B1) 100 MG
1 TABLET ORAL
Qty: 0 | Refills: 0 | DISCHARGE
Start: 2020-09-27

## 2020-09-27 RX ADMIN — Medication 6 UNIT(S): at 09:05

## 2020-09-27 RX ADMIN — Medication 2: at 09:05

## 2020-09-27 RX ADMIN — Medication 100 MILLIGRAM(S): at 11:45

## 2020-09-27 RX ADMIN — Medication 3 MILLIGRAM(S): at 02:20

## 2020-09-27 RX ADMIN — OXYCODONE HYDROCHLORIDE 5 MILLIGRAM(S): 5 TABLET ORAL at 00:24

## 2020-09-27 RX ADMIN — Medication 40 MILLIEQUIVALENT(S): at 09:20

## 2020-09-27 RX ADMIN — Medication 81 MILLIGRAM(S): at 11:45

## 2020-09-27 RX ADMIN — Medication 1 TABLET(S): at 11:46

## 2020-09-27 RX ADMIN — Medication 145 MILLIGRAM(S): at 11:45

## 2020-09-27 RX ADMIN — ENOXAPARIN SODIUM 40 MILLIGRAM(S): 100 INJECTION SUBCUTANEOUS at 11:45

## 2020-09-27 RX ADMIN — Medication 2 GRAM(S): at 06:17

## 2020-09-27 RX ADMIN — OXYCODONE HYDROCHLORIDE 5 MILLIGRAM(S): 5 TABLET ORAL at 00:54

## 2020-09-27 RX ADMIN — Medication 40 MILLIEQUIVALENT(S): at 10:42

## 2020-09-27 RX ADMIN — Medication 1 MILLIGRAM(S): at 11:45

## 2020-09-27 NOTE — PROGRESS NOTE ADULT - ASSESSMENT
59 y/o M w/ [id number] (not adherent to Metformin, A1c 11.8) p/w abdominal pain for 1 day a/w pancreatitis likely 2/2 hypertriglyceridemia, 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

## 2020-09-27 NOTE — PROGRESS NOTE ADULT - PROVIDER SPECIALTY LIST ADULT
Endocrinology
MICU
MICU
Vascular Surgery
Internal Medicine
Internal Medicine

## 2020-09-27 NOTE — PROGRESS NOTE ADULT - ATTENDING COMMENTS
Patient examined and care reviewed in detail on bedside rounds
Patient examined and care reviewed in detail on bedside rounds  Critically ill with severe hypertriglyceridemia and pancreatitis  Frequent bedside visits with therapy change today.   I have personally provided 35+ minutes of critical care time concurrently with the resident/fellow; this excludes time spent on separate procedures.
Uncontrolled DM2, severe hypertriglyceridemia induced pancreatitis. S/p Insulin drip now off - agree with basal bolus insulin inpatient. Continue lipid lowering agents as tolerated. Insulin pen teaching, RD consult.   DC on basal plus orals such as metformin.  Patient is high risk and high level decision making, requiring ICU level of care.    Herminio Mack MD  Division of Endocrinology  Pager: [phone]    If after 6PM or before 9AM, or on weekends/holidays, please call endocrine answering service for assistance ([phone]).  For nonurgent matters email [email or web address] for assistance.
Patient transferred to medical floor overnight. Pt febrile overnight but symptomatically improved. Noted mild thrombocytopenia and asymptomatic hyponatremia. Will continue to moniotor   - pt pending mesenteric ultrasoud, will f/u
Pt symptomatically improved and wants to go home. Pt reportedly demonstrated proficiency in self administration of insulin. NOted mild hyponatremia and hypokalemia.   - replete KCl 40mg x2 doses  - encourage adequate oral hydration and food intake  - pt pending mesenteric duplex to evaluate if celiac artery dissection can be seen on duplex. However, pt not willing to stay inpatient to get it done. Will need outpatient f/u  - continue current insulin regimen  REmainder of plan as discussed above

## 2020-09-27 NOTE — PROGRESS NOTE ADULT - PROBLEM SELECTOR PLAN 7
DVT: lovenox sc  Diet: CC as tolerated  Code: full DVT: Lovenox sc  Diet: CC as tolerated  Code: full

## 2020-09-27 NOTE — PROGRESS NOTE ADULT - SUBJECTIVE AND OBJECTIVE BOX
Mirlande Kapadia MD-PGY3  Department of Internal Medicine  Pager [phone]    PROGRESS NOTE:       Patient is a 60y old  Male who presents with a chief complaint of Pancreatitis 2/2 hypertriglyceridemia (26 Sep 2020 12:03)      SUBJECTIVE / OVERNIGHT EVENTS: Febrile overnight    ADDITIONAL REVIEW OF SYSTEMS:    MEDICATIONS  (STANDING):  aspirin enteric coated 81 milliGRAM(s) Oral daily  atorvastatin 80 milliGRAM(s) Oral at bedtime  bisacodyl 5 milliGRAM(s) Oral at bedtime  dextrose 5%. 1000 milliLiter(s) (50 mL/Hr) IV Continuous <Continuous>  dextrose 50% Injectable 12.5 Gram(s) IV Push once  dextrose 50% Injectable 25 Gram(s) IV Push once  dextrose 50% Injectable 25 Gram(s) IV Push once  enoxaparin Injectable 40 milliGRAM(s) SubCutaneous daily  fenofibrate Tablet 145 milliGRAM(s) Oral daily  folic acid 1 milliGRAM(s) Oral daily  influenza   Vaccine 0.5 milliLiter(s) IntraMuscular once  insulin glargine Injectable (LANTUS) 20 Unit(s) SubCutaneous <User Schedule>  insulin lispro (HumaLOG) corrective regimen sliding scale   SubCutaneous three times a day before meals  insulin lispro (HumaLOG) corrective regimen sliding scale   SubCutaneous at bedtime  insulin lispro Injectable (HumaLOG) 6 Unit(s) SubCutaneous three times a day before meals  multivitamin 1 Tablet(s) Oral daily  omega-3-Acid Ethyl Esters 2 Gram(s) Oral two times a day  thiamine 100 milliGRAM(s) Oral daily    MEDICATIONS  (PRN):  acetaminophen   Tablet .. 650 milliGRAM(s) Oral every 6 hours PRN Temp greater or equal to 38C (100.4F), Mild Pain (1 - 3), Moderate Pain (4 - 6)  aluminum hydroxide/magnesium hydroxide/simethicone Suspension 30 milliLiter(s) Oral every 4 hours PRN Dyspepsia  dextrose 40% Gel 15 Gram(s) Oral once PRN Blood Glucose LESS THAN 70 milliGRAM(s)/deciliter  glucagon  Injectable 1 milliGRAM(s) IntraMuscular once PRN Glucose LESS THAN 70 milligrams/deciliter  melatonin 3 milliGRAM(s) Oral at bedtime PRN Insomnia  oxyCODONE    IR 5 milliGRAM(s) Oral every 4 hours PRN Severe Pain (7 - 10)  senna 2 Tablet(s) Oral at bedtime PRN Constipation      CAPILLARY BLOOD GLUCOSE      POCT Blood Glucose.: 141 mg/dL (26 Sep 2020 22:26)  POCT Blood Glucose.: 137 mg/dL (26 Sep 2020 17:21)  POCT Blood Glucose.: 160 mg/dL (26 Sep 2020 12:29)  POCT Blood Glucose.: 160 mg/dL (26 Sep 2020 08:36)    I&O's Summary    25 Sep 2020 07:01  -  26 Sep 2020 07:00  --------------------------------------------------------  IN: 1693 mL / OUT: 900 mL / NET: 793 mL    26 Sep 2020 07:01  -  27 Sep 2020 06:56  --------------------------------------------------------  IN: 150 mL / OUT: 0 mL / NET: 150 mL        PHYSICAL EXAM:  Vital Signs Last 24 Hrs  T(C): 37.1 (27 Sep 2020 05:57), Max: 38.2 (26 Sep 2020 21:57)  T(F): 98.7 (27 Sep 2020 05:57), Max: 100.7 (26 Sep 2020 21:57)  HR: 87 (27 Sep 2020 05:57) (75 - 92)  BP: 119/72 (27 Sep 2020 05:57) (119/72 - 135/85)  BP(mean): --  RR: 16 (27 Sep 2020 05:57) (16 - 28)  SpO2: 96% (27 Sep 2020 05:57) (94% - 98%)    CONSTITUTIONAL: NAD, well-developed  RESPIRATORY: Normal respiratory effort; lungs are clear to auscultation bilaterally  CARDIOVASCULAR: Regular rate and rhythm, normal S1 and S2, no murmur/rub/gallop; No lower extremity edema; Peripheral pulses are 2+ bilaterally  ABDOMEN: Nontender to palpation, normoactive bowel sounds, no rebound/guarding; No hepatosplenomegaly  MUSCLOSKELETAL: no clubbing or cyanosis of digits; no joint swelling or tenderness to palpation  PSYCH: A+O to person, place, and time; affect appropriate    LABS:                        13.2   8.21  )-----------( 111      ( 26 Sep 2020 06:57 )             40.4     09-26    132<L>  |  97<L>  |  9   ----------------------------<  174<H>  3.5   |  21<L>  |  0.80    Ca    8.4      26 Sep 2020 06:57  Phos  1.8     09-26  Mg     2.1     09-26    TPro  6.6  /  Alb  3.2<L>  /  TBili  1.5<H>  /  DBili  x   /  AST  16  /  ALT  17  /  AlkPhos  48  09-26                RADIOLOGY & ADDITIONAL TESTS:  Results Reviewed:   Imaging Personally Reviewed:  Electrocardiogram Personally Reviewed:    COORDINATION OF CARE:  Care Discussed with Consultants/Other Providers [Y/N]:  Prior or Outpatient Records Reviewed [Y/N]:   Mirlande Kapadia MD-PGY3  Department of Internal Medicine  Pager [phone]    PROGRESS NOTE:       Patient is a 60y old  Male who presents with a chief complaint of Pancreatitis 2/2 hypertriglyceridemia (26 Sep 2020 12:03)      SUBJECTIVE / OVERNIGHT EVENTS: Febrile overnight to 100.7F. Pt reports having normal BM, ambulating without limitation. Reports 6/10 mid-abdominal and back pain, same as yesterday. Tolerating po intake.    ADDITIONAL REVIEW OF SYSTEMS:    CONSTITUTIONAL: No weakness, fevers or chills  EYES/ENT: No visual changes;  No vertigo or throat pain   NECK: No pain or stiffness  RESPIRATORY: No cough, wheezing, hemoptysis; No shortness of breath  CARDIOVASCULAR: No chest pain or palpitations  GASTROINTESTINAL: + abd pain 6/10. No nausea, vomiting, or hematemesis; No diarrhea or constipation. No melena or hematochezia.  GENITOURINARY: No dysuria, frequency or hematuria  NEUROLOGICAL: No numbness or weakness  SKIN: No itching, burning, rashes, or lesions   All other review of systems is negative unless indicated above.    MEDICATIONS  (STANDING):  aspirin enteric coated 81 milliGRAM(s) Oral daily  atorvastatin 80 milliGRAM(s) Oral at bedtime  bisacodyl 5 milliGRAM(s) Oral at bedtime  dextrose 5%. 1000 milliLiter(s) (50 mL/Hr) IV Continuous <Continuous>  dextrose 50% Injectable 12.5 Gram(s) IV Push once  dextrose 50% Injectable 25 Gram(s) IV Push once  dextrose 50% Injectable 25 Gram(s) IV Push once  enoxaparin Injectable 40 milliGRAM(s) SubCutaneous daily  fenofibrate Tablet 145 milliGRAM(s) Oral daily  folic acid 1 milliGRAM(s) Oral daily  influenza   Vaccine 0.5 milliLiter(s) IntraMuscular once  insulin glargine Injectable (LANTUS) 20 Unit(s) SubCutaneous <User Schedule>  insulin lispro (HumaLOG) corrective regimen sliding scale   SubCutaneous three times a day before meals  insulin lispro (HumaLOG) corrective regimen sliding scale   SubCutaneous at bedtime  insulin lispro Injectable (HumaLOG) 6 Unit(s) SubCutaneous three times a day before meals  multivitamin 1 Tablet(s) Oral daily  omega-3-Acid Ethyl Esters 2 Gram(s) Oral two times a day  thiamine 100 milliGRAM(s) Oral daily    MEDICATIONS  (PRN):  acetaminophen   Tablet .. 650 milliGRAM(s) Oral every 6 hours PRN Temp greater or equal to 38C (100.4F), Mild Pain (1 - 3), Moderate Pain (4 - 6)  aluminum hydroxide/magnesium hydroxide/simethicone Suspension 30 milliLiter(s) Oral every 4 hours PRN Dyspepsia  dextrose 40% Gel 15 Gram(s) Oral once PRN Blood Glucose LESS THAN 70 milliGRAM(s)/deciliter  glucagon  Injectable 1 milliGRAM(s) IntraMuscular once PRN Glucose LESS THAN 70 milligrams/deciliter  melatonin 3 milliGRAM(s) Oral at bedtime PRN Insomnia  oxyCODONE    IR 5 milliGRAM(s) Oral every 4 hours PRN Severe Pain (7 - 10)  senna 2 Tablet(s) Oral at bedtime PRN Constipation      CAPILLARY BLOOD GLUCOSE      POCT Blood Glucose.: 141 mg/dL (26 Sep 2020 22:26)  POCT Blood Glucose.: 137 mg/dL (26 Sep 2020 17:21)  POCT Blood Glucose.: 160 mg/dL (26 Sep 2020 12:29)  POCT Blood Glucose.: 160 mg/dL (26 Sep 2020 08:36)    I&O's Summary    25 Sep 2020 07:01  -  26 Sep 2020 07:00  --------------------------------------------------------  IN: 1693 mL / OUT: 900 mL / NET: 793 mL    26 Sep 2020 07:01  -  27 Sep 2020 06:56  --------------------------------------------------------  IN: 150 mL / OUT: 0 mL / NET: 150 mL        PHYSICAL EXAM:  Vital Signs Last 24 Hrs  T(C): 37.1 (27 Sep 2020 05:57), Max: 38.2 (26 Sep 2020 21:57)  T(F): 98.7 (27 Sep 2020 05:57), Max: 100.7 (26 Sep 2020 21:57)  HR: 87 (27 Sep 2020 05:57) (75 - 92)  BP: 119/72 (27 Sep 2020 05:57) (119/72 - 135/85)  BP(mean): --  RR: 16 (27 Sep 2020 05:57) (16 - 28)  SpO2: 96% (27 Sep 2020 05:57) (94% - 98%)    CONSTITUTIONAL: NAD, well-developed  RESPIRATORY: Normal respiratory effort; lungs are clear to auscultation bilaterally  CARDIOVASCULAR: Regular rate and rhythm, normal S1 and S2, no murmur/rub/gallop; No lower extremity edema; Peripheral pulses are 2+ bilaterally  ABDOMEN: Nontender to palpation, normoactive bowel sounds, no rebound/guarding; No hepatosplenomegaly  MUSCLOSKELETAL: no clubbing or cyanosis of digits; no joint swelling or tenderness to palpation  PSYCH: A+O to person, place, and time; affect appropriate    LABS:                        13.2   8.21  )-----------( 111      ( 26 Sep 2020 06:57 )             40.4     09-26    132<L>  |  97<L>  |  9   ----------------------------<  174<H>  3.5   |  21<L>  |  0.80    Ca    8.4      26 Sep 2020 06:57  Phos  1.8     09-26  Mg     2.1     09-26    TPro  6.6  /  Alb  3.2<L>  /  TBili  1.5<H>  /  DBili  x   /  AST  16  /  ALT  17  /  AlkPhos  48  09-26                RADIOLOGY & ADDITIONAL TESTS:  Results Reviewed:   Imaging Personally Reviewed:  Electrocardiogram Personally Reviewed:    COORDINATION OF CARE:  Care Discussed with Consultants/Other Providers [Y/N]:  Prior or Outpatient Records Reviewed [Y/N]:   Mirlande Kapadia MD-PGY3  Department of Internal Medicine  Pager [phone]    PROGRESS NOTE:     Patient is a 60y old  Male who presents with a chief complaint of Pancreatitis 2/2 hypertriglyceridemia (26 Sep 2020 12:03)      SUBJECTIVE / OVERNIGHT EVENTS: Febrile overnight to 100.7F. Pt reports having normal BM, ambulating without limitation. Reports 6/10 mid-abdominal and back pain, same as yesterday. Tolerating po intake.    ADDITIONAL REVIEW OF SYSTEMS:    CONSTITUTIONAL: No weakness, fevers or chills  EYES/ENT: No visual changes;  No vertigo or throat pain   NECK: No pain or stiffness  RESPIRATORY: No cough, wheezing, hemoptysis; No shortness of breath  CARDIOVASCULAR: No chest pain or palpitations  GASTROINTESTINAL: + abd pain 6/10. No nausea, vomiting, or hematemesis; No diarrhea or constipation. No melena or hematochezia.  GENITOURINARY: No dysuria, frequency or hematuria  NEUROLOGICAL: No numbness or weakness  SKIN: No itching, burning, rashes, or lesions   All other review of systems is negative unless indicated above.    MEDICATIONS  (STANDING):  aspirin enteric coated 81 milliGRAM(s) Oral daily  atorvastatin 80 milliGRAM(s) Oral at bedtime  bisacodyl 5 milliGRAM(s) Oral at bedtime  dextrose 5%. 1000 milliLiter(s) (50 mL/Hr) IV Continuous <Continuous>  dextrose 50% Injectable 12.5 Gram(s) IV Push once  dextrose 50% Injectable 25 Gram(s) IV Push once  dextrose 50% Injectable 25 Gram(s) IV Push once  enoxaparin Injectable 40 milliGRAM(s) SubCutaneous daily  fenofibrate Tablet 145 milliGRAM(s) Oral daily  folic acid 1 milliGRAM(s) Oral daily  influenza   Vaccine 0.5 milliLiter(s) IntraMuscular once  insulin glargine Injectable (LANTUS) 20 Unit(s) SubCutaneous <User Schedule>  insulin lispro (HumaLOG) corrective regimen sliding scale   SubCutaneous three times a day before meals  insulin lispro (HumaLOG) corrective regimen sliding scale   SubCutaneous at bedtime  insulin lispro Injectable (HumaLOG) 6 Unit(s) SubCutaneous three times a day before meals  multivitamin 1 Tablet(s) Oral daily  omega-3-Acid Ethyl Esters 2 Gram(s) Oral two times a day  thiamine 100 milliGRAM(s) Oral daily    MEDICATIONS  (PRN):  acetaminophen   Tablet .. 650 milliGRAM(s) Oral every 6 hours PRN Temp greater or equal to 38C (100.4F), Mild Pain (1 - 3), Moderate Pain (4 - 6)  aluminum hydroxide/magnesium hydroxide/simethicone Suspension 30 milliLiter(s) Oral every 4 hours PRN Dyspepsia  dextrose 40% Gel 15 Gram(s) Oral once PRN Blood Glucose LESS THAN 70 milliGRAM(s)/deciliter  glucagon  Injectable 1 milliGRAM(s) IntraMuscular once PRN Glucose LESS THAN 70 milligrams/deciliter  melatonin 3 milliGRAM(s) Oral at bedtime PRN Insomnia  oxyCODONE    IR 5 milliGRAM(s) Oral every 4 hours PRN Severe Pain (7 - 10)  senna 2 Tablet(s) Oral at bedtime PRN Constipation      CAPILLARY BLOOD GLUCOSE      POCT Blood Glucose.: 141 mg/dL (26 Sep 2020 22:26)  POCT Blood Glucose.: 137 mg/dL (26 Sep 2020 17:21)  POCT Blood Glucose.: 160 mg/dL (26 Sep 2020 12:29)  POCT Blood Glucose.: 160 mg/dL (26 Sep 2020 08:36)    I&O's Summary    25 Sep 2020 07:01  -  26 Sep 2020 07:00  --------------------------------------------------------  IN: 1693 mL / OUT: 900 mL / NET: 793 mL    26 Sep 2020 07:01  -  27 Sep 2020 06:56  --------------------------------------------------------  IN: 150 mL / OUT: 0 mL / NET: 150 mL        PHYSICAL EXAM:  Vital Signs Last 24 Hrs  T(C): 37.1 (27 Sep 2020 05:57), Max: 38.2 (26 Sep 2020 21:57)  T(F): 98.7 (27 Sep 2020 05:57), Max: 100.7 (26 Sep 2020 21:57)  HR: 87 (27 Sep 2020 05:57) (75 - 92)  BP: 119/72 (27 Sep 2020 05:57) (119/72 - 135/85)  BP(mean): --  RR: 16 (27 Sep 2020 05:57) (16 - 28)  SpO2: 96% (27 Sep 2020 05:57) (94% - 98%)    CONSTITUTIONAL: NAD, well-developed  RESPIRATORY: Normal respiratory effort; lungs are clear to auscultation bilaterally  CARDIOVASCULAR: Regular rate and rhythm, normal S1 and S2, no murmur/rub/gallop; No lower extremity edema; Peripheral pulses are 2+ bilaterally  ABDOMEN: mild tenderness to palpation in mid abdomen, normoactive bowel sounds, no rebound/guarding; No hepatosplenomegaly  MUSCULOSKELETAL no clubbing or cyanosis of digits; no joint swelling or tenderness to palpation  PSYCH: A+O to person, place, and time; affect appropriate    LABS:                        13.2   8.21  )-----------( 111      ( 26 Sep 2020 06:57 )             40.4     09-26    132<L>  |  97<L>  |  9   ----------------------------<  174<H>  3.5   |  21<L>  |  0.80    Ca    8.4      26 Sep 2020 06:57  Phos  1.8     09-26  Mg     2.1     09-26    TPro  6.6  /  Alb  3.2<L>  /  TBili  1.5<H>  /  DBili  x   /  AST  16  /  ALT  17  /  AlkPhos  48  09-26                RADIOLOGY & ADDITIONAL TESTS:  Results Reviewed:   Imaging Personally Reviewed:  Electrocardiogram Personally Reviewed:    COORDINATION OF CARE:  Care Discussed with Consultants/Other Providers [Y/N]:  Prior or Outpatient Records Reviewed [Y/N]:

## 2020-09-27 NOTE — DISCHARGE NOTE PROVIDER - NSDCFUADDAPPT_GEN_ALL_CORE_FT
Please call [phone] Please call [phone] to schedule an appointment with vascular surgery. Please call number above for Dr. Valdez or [phone] to schedule an appointment with vascular surgery.

## 2020-09-27 NOTE — PROGRESS NOTE ADULT - PROBLEM SELECTOR PLAN 2
TG 3800 on admission, likely etiology for acute pancreatitis  -Endo on board, started on Lipitor 20mg QHS, Fenofibrate 145mg daily, Lovaza 2g daily  [facility]  -Increase to Atorvastatin 80mg QHS as ASCVD Risk 82.4%   -Continue Fenofibrate 145mg daily and Lovaza 2g BID -Continue Fenofibrate 145mg daily and Lovaza 2g BID,  Atorvastatin 80mg QHS

## 2020-09-27 NOTE — PROGRESS NOTE ADULT - PROBLEM SELECTOR PLAN 4
Incidentally found on CT  -Vascular on board, will continue to manage as outpatient  -Mesenteric US ahead of discharge Incidentally found on CT  -Vascular on board, will continue to manage as outpatient  -pending Mesenteric US

## 2020-09-27 NOTE — PROGRESS NOTE ADULT - ASSESSMENT
60M w/ pmh DM (not on medication) presenting with abdominal pain 2/2 pancreatitis. CT demonstrating celiac artery dissection.     - c/w ASA/statin  - CTA reviewed  -please obtain mesenteric duplex to evaluate if celiac artery dissection can be seen on duplex, this will function as a baseline duplex for follow up as outpatient  -no urgent vascular surgery intervention necessary at this time  -will call vascular lab to attempt to get duplex this AM  -after mesenteric duplex, no vascular surgery contraindication to discharge  follow up with vascular surgeon Dr. Valdez as outpatient in 2 weeks    Discussed with vascular surgery fellow      Vascular Surgery (C Team Surgery)  [phone] with any questions

## 2020-09-27 NOTE — DISCHARGE NOTE PROVIDER - PROVIDER TOKENS
PROVIDER:[TOKEN:[[id number]]],PROVIDER:[TOKEN:[[id number]]] PROVIDER:[TOKEN:[[id number]]],PROVIDER:[TOKEN:[[id number]]]

## 2020-09-27 NOTE — DISCHARGE NOTE PROVIDER - CARE PROVIDERS DIRECT ADDRESSES
,DirectAddress_Unknown,[email or web address] ,DirectAddress_Unknown,[email or web address]

## 2020-09-27 NOTE — PROGRESS NOTE ADULT - PROBLEM SELECTOR PLAN 1
Presented with 10/10 abdominal pain associated with nausea, found to have TG 3800 and Lipase 2900, CT c/w pancreatitis, likely 2/2 hypertriglyceridemia  -In MICU received insulin gtt and IVF x2 days  -Since being downgraded, patient tolerating PO and pain improving  -Continue to advance diet as tolerated, pain control as needed Presented with 10/10 abdominal pain associated with nausea, found to have TG 3800 and Lipase 2900, CT c/w pancreatitis, likely 2/2 hypertriglyceridemia  -In MICU received insulin gtt and IVF x2 days  -Since being downgraded, patient tolerating PO and pain improving  -Continue to advance diet as tolerated, pain control as needed  -still spiking low grade fevers, continue to monitor Presented with 10/10 abdominal pain associated with nausea, found to have TG 3800 and Lipase 2900, CT c/w pancreatitis, likely 2/2 hypertriglyceridemia  -In MICU received insulin gtt and IVF x2 days  -Since being downgraded, patient tolerating PO and pain stable  -Continue to advance diet as tolerated, pain control as needed  -still spiking low grade fevers, continue to monitor  -repeat TG level today per Endocrine

## 2020-09-27 NOTE — DISCHARGE NOTE NURSING/CASE MANAGEMENT/SOCIAL WORK - PATIENT PORTAL LINK FT
You can access the FollowMyHealth Patient Portal offered by [facility] by registering at the following website: [email or web address]. By joining [facility]’s FollowMyHealth portal, you will also be able to view your health information using other applications (apps) compatible with our system.

## 2020-09-27 NOTE — PROGRESS NOTE ADULT - PROBLEM SELECTOR PROBLEM 3
Type 2 diabetes mellitus with hyperglycemia, without long-term current use of insulin
Diabetes mellitus
Diabetes mellitus

## 2020-09-27 NOTE — PROGRESS NOTE ADULT - PROBLEM SELECTOR PLAN 8
Plan: Transitions of Care Status:  1.  Name of PCP:   2.  PCP Contacted on Admission: [ ] Y    [ ] N    3.  PCP contacted at Discharge: [ ] Y    [ ] N    [ ] N/A  4.  Post-Discharge Appointment Date and Location:  5.  Summary of Handoff given to PCP: Plan: Transitions of Care Status:  1.  Name of PCP: Dr. Dinora Tucker  2.  PCP Contacted on Admission: [ ] Y    [ X] N    3.  PCP contacted at Discharge: [ ] Y    [ ] N    [ ] N/A  4.  Post-Discharge Appointment Date and Location:  5.  Summary of Handoff given to PCP:

## 2020-09-27 NOTE — PROGRESS NOTE ADULT - SUBJECTIVE AND OBJECTIVE BOX
Vascular Surgery Progress Note     S: Patient resting comfortably on morning rounds. Pain well-controlled currently.    improved daily and much improved since admission  understands plan for mesenteric duplex this AM    Physical Exam:    General: NAD, AOx3  Resp: breathing comfortably  CV: appears well perfused  Abd: minimal guarding RUQ, otherwise soft nontender nondistended    T(C): 37.1 (09-27-20 @ 05:57), Max: 38.2 (09-26-20 @ 21:57)  HR: 87 (09-27-20 @ 05:57) (75 - 92)  BP: 119/72 (09-27-20 @ 05:57) (119/72 - 135/85)  RR: 16 (09-27-20 @ 05:57) (16 - 28)  SpO2: 96% (09-27-20 @ 05:57) (94% - 98%)    09-26-20 @ 07:01  -  09-27-20 @ 07:00  --------------------------------------------------------  IN: 300 mL / OUT: 0 mL / NET: 300 mL        LABS:                        13.2   8.21  )-----------( 111      ( 26 Sep 2020 06:57 )             40.4     09-26    132<L>  |  97<L>  |  9   ----------------------------<  174<H>  3.5   |  21<L>  |  0.80    Ca    8.4      26 Sep 2020 06:57  Phos  1.8     09-26  Mg     2.1     09-26    TPro  6.6  /  Alb  3.2<L>  /  TBili  1.5<H>  /  DBili  x   /  AST  16  /  ALT  17  /  AlkPhos  48  09-26

## 2020-09-27 NOTE — DISCHARGE NOTE PROVIDER - NSDCMRMEDTOKEN_GEN_ALL_CORE_FT
aspirin 81 mg oral delayed release tablet: 1 tab(s) orally once a day  atorvastatin 80 mg oral tablet: 1 tab(s) orally once a day (at bedtime)  fenofibrate 145 mg oral tablet: 1 tab(s) orally once a day  folic acid 1 mg oral tablet: 1 tab(s) orally once a day  metFORMIN:   Multiple Vitamins oral tablet: 1 tab(s) orally once a day  omega-3 polyunsaturated fatty acids ethyl esters 1000 mg oral capsule: 2 cap(s) orally 2 times a day  thiamine 100 mg oral tablet: 1 tab(s) orally once a day   alcohol swabs : Apply topically to affected area 4 times a day   aspirin 81 mg oral delayed release tablet: 1 tab(s) orally once a day  atorvastatin 80 mg oral tablet: 1 tab(s) orally once a day (at bedtime)  fenofibrate 145 mg oral tablet: 1 tab(s) orally once a day  folic acid 1 mg oral tablet: 1 tab(s) orally once a day  glucometer (per patient&#x27;s insurance): Test blood sugars four times a day. Dispense #1 glucometer.  glucose tablets: Follow instructions on bottle when sugar is low.  Insulin Pen Needles, 4mm: 1 application subcutaneously 4 times a day. ** Use with insulin pen **   lancets: 1 application subcutaneously 4 times a day   Lantus 100 units/mL subcutaneous solution: 20 unit(s) subcutaneous once a day (at bedtime)   metFORMIN 500 mg oral tablet: 1 tab(s) orally 2 times a day for the first week then increase to 2 tabs a day.  Multiple Vitamins oral tablet: 1 tab(s) orally once a day  omega-3 polyunsaturated fatty acids ethyl esters 1000 mg oral capsule: 2 cap(s) orally 2 times a day  test strips (per patient&#x27;s insurance): 1 application subcutaneously 4 times a day. ** Compatible with patient&#x27;s glucometer **  thiamine 100 mg oral tablet: 1 tab(s) orally once a day

## 2020-09-27 NOTE — PROGRESS NOTE ADULT - PROBLEM SELECTOR PLAN 6
Plt trending down, today 111, likely from inflammation  Will repeat CBC tomorrow Plt trending down, today 111, likely from inflammation Plt yesterday 111, likely from inflammation  pending repeat today Plt yesterday 111, likely from inflammation. Improved today at 128. K 3.2, likely due to decreased po intake.   will supplement with oral potassium 40 meQ x2  monitor bmp daily

## 2020-09-27 NOTE — DISCHARGE NOTE PROVIDER - NSFOLLOWUPCLINICS_GEN_ALL_ED_FT
[facility] Endocrinology  Endocrinology  [address] [place] [address]  Phone: [phone]  Fax:   Follow Up Time:

## 2020-09-27 NOTE — DISCHARGE NOTE PROVIDER - NSDCCPCAREPLAN_GEN_ALL_CORE_FT
PRINCIPAL DISCHARGE DIAGNOSIS  Diagnosis: Acute pancreatitis  Assessment and Plan of Treatment:       SECONDARY DISCHARGE DIAGNOSES  Diagnosis: Diabetes mellitus  Assessment and Plan of Treatment: Diabetes mellitus    Diagnosis: Celiac artery dissection  Assessment and Plan of Treatment: Celiac artery dissection     PRINCIPAL DISCHARGE DIAGNOSIS  Diagnosis: Acute pancreatitis  Assessment and Plan of Treatment: You had severe abdominal and back pain due to inflammation of your pancrease caused by extremely high triglyceride levels.  You were treated with an insulin drip until the triglycerides decreased. You were then started on 3 cholesterol lowering medications: please continue to take atorvastatin, fenofibrate and lovaza. Please follow up with endocrinology outpatient and with your PMD in 1 week.      SECONDARY DISCHARGE DIAGNOSES  Diagnosis: Diabetes mellitus  Assessment and Plan of Treatment: You have poorly controlled diabetes. Your A1C was over 11%. Goal A1C is less than 7%. You were started on insulin while in the hospital. Diabetes usually does not cause symptoms but slowly causes nerve damage, blindness, increases your risk for heart and kidney disease, and poor wound healing. Symtoms include needing to urinate more often, increased thirst and blurry vision. Please continue to take __ and __. Monitor your blood sugar by recording the fasting blood sugar before breakfast and sugars before meals and at bedtime. If your sugars are above 200 or less than 70 let your doctor know.    Diagnosis: Celiac artery dissection  Assessment and Plan of Treatment: You were found to have a dissection of your celiac artery. Please have an mesenteric duplex ultrasound done and follow up with Vascular Surgery. You were evaluated by Vascular surgery while in the hospital and they recommended starting aspirin 81 mg once a day and continuing atorvastin. If you have black tarry or bloody stools please seek medical attention immediately.     PRINCIPAL DISCHARGE DIAGNOSIS  Diagnosis: Acute pancreatitis  Assessment and Plan of Treatment: You had severe abdominal and back pain due to inflammation of your pancrease caused by extremely high triglyceride levels.  You were treated with an insulin drip until the triglycerides decreased. You were then started on 3 cholesterol lowering medications: please continue to take atorvastatin, fenofibrate and lovaza. Please follow up with endocrinology outpatient and with your PMD in 1 week.      SECONDARY DISCHARGE DIAGNOSES  Diagnosis: Celiac artery dissection  Assessment and Plan of Treatment: You were found to have a dissection of your celiac artery. Please have an mesenteric duplex ultrasound done and follow up with Vascular Surgery. You were evaluated by Vascular surgery while in the hospital and they recommended starting aspirin 81 mg once a day and continuing atorvastin. If you have black tarry or bloody stools please seek medical attention immediately.    Diagnosis: Diabetes mellitus  Assessment and Plan of Treatment: You have poorly controlled diabetes. Your A1C was over 11%. Goal A1C is less than 7%. You were started on insulin while in the hospital. Diabetes usually does not cause symptoms but slowly causes nerve damage, blindness, increases your risk for heart and kidney disease, and poor wound healing. Symtoms include needing to urinate more often, increased thirst and blurry vision. Please continue to take __ and __. Monitor your blood sugar by recording the fasting blood sugar before breakfast and sugars before meals and at bedtime. If your sugars are above 200 or less than 70 let your doctor know.    Diagnosis: Thrombocytopenia  Assessment and Plan of Treatment:      PRINCIPAL DISCHARGE DIAGNOSIS  Diagnosis: Acute pancreatitis  Assessment and Plan of Treatment: You had severe abdominal and back pain due to inflammation of your pancrease caused by extremely high triglyceride levels.  You were treated with an insulin drip until the triglycerides decreased. You were then started on 3 cholesterol lowering medications: please continue to take atorvastatin, fenofibrate and lovaza. Please follow up with endocrinology outpatient and with your PMD in 1 week.      SECONDARY DISCHARGE DIAGNOSES  Diagnosis: Celiac artery dissection  Assessment and Plan of Treatment: You were found to have a dissection of your celiac artery. Please have an mesenteric duplex ultrasound done and follow up with Vascular Surgery. You were evaluated by Vascular surgery while in the hospital and they recommended starting aspirin 81 mg once a day and continuing atorvastin. If you have black tarry or bloody stools please seek medical attention immediately.    Diagnosis: Diabetes mellitus  Assessment and Plan of Treatment: You have poorly controlled diabetes. Your A1C was over 11%. Goal A1C is less than 7%. You were started on insulin while in the hospital. Diabetes usually does not cause symptoms but slowly causes nerve damage, blindness, increases your risk for heart and kidney disease, and poor wound healing. Symtoms include needing to urinate more often, increased thirst and blurry vision. Please continue to take __ and __. Monitor your blood sugar by recording the fasting blood sugar before breakfast and sugars before meals and at bedtime. If your sugars are above 200 or less than 70 let your doctor know.    Diagnosis: Thrombocytopenia  Assessment and Plan of Treatment: You had low platelets while in the hospital. It may have been caused by acute inflammation from the pancreatitis but you were also found to have fatty liver. To prevent progression of liver disease you should lose weight and improve your cholesterol levels. Please discuss further with your primary care doctor.     PRINCIPAL DISCHARGE DIAGNOSIS  Diagnosis: Acute pancreatitis  Assessment and Plan of Treatment: You had severe abdominal and back pain due to inflammation of your pancrease caused by extremely high triglyceride levels.  You were treated with an insulin drip until the triglycerides decreased. You were then started on 3 cholesterol lowering medications: please continue to take atorvastatin, fenofibrate and lovaza. Please follow up with endocrinology outpatient and with your PMD in 1 week.      SECONDARY DISCHARGE DIAGNOSES  Diagnosis: Celiac artery dissection  Assessment and Plan of Treatment: You were found to have a dissection of your celiac artery. Please have an mesenteric duplex ultrasound done and follow up with Vascular Surgery. You were evaluated by Vascular surgery while in the hospital and they recommended starting aspirin 81 mg once a day and continuing atorvastin. If you have black tarry or bloody stools please seek medical attention immediately.    Diagnosis: Diabetes mellitus  Assessment and Plan of Treatment: You have poorly controlled diabetes. Your A1C was over 11%. Goal A1C is less than 7%. You were started on insulin while in the hospital. Diabetes usually does not cause symptoms but slowly causes nerve damage, blindness, increases your risk for heart and kidney disease, and poor wound healing. Symtoms include needing to urinate more often, increased thirst and blurry vision. Please continue to inject Lantus 20 units at bedtime and 500mg metformin twice a day for the first week and then increase it to two tablets (1000mg) two times a day. Monitor your blood sugar by recording the fasting blood sugar before breakfast and sugars before meals and at bedtime. If your sugars are above 200 or less than 70 let your doctor know.    Diagnosis: Thrombocytopenia  Assessment and Plan of Treatment: You had low platelets while in the hospital. It may have been caused by acute inflammation from the pancreatitis but you were also found to have a fatty liver which also can cause a low platelet count. To prevent progression of liver disease you should lose weight and improve your cholesterol levels. Please discuss further with your primary care doctor.

## 2020-09-27 NOTE — PROGRESS NOTE ADULT - PROBLEM SELECTOR PLAN 5
Ph 1.8 today, will replete  Will repeat lab tomorrow and replete as needed pending AM labs Plt yesterday 111, likely from inflammation. Improved today at 128.

## 2020-09-27 NOTE — DISCHARGE NOTE PROVIDER - HOSPITAL COURSE
59yo M w/ [id number] (not on medication) p/w abdominal pain for 1 day a/w pancreatitis likely 2/2 hypertriglyceridemia. In the MICU, patient treated w/ IVF and placed on Insulin gtt. Initial TGs were > 300. Patient taken off insulin gtt when TGs were found to be < 500. 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 Vascular surgery was consulted and recommended ASA and mesenteric duplex. Endocrine was also consulted for uncontrolled T2DM (A1c 11.8, patient non-adherent w/ home metformin). Pt also oliguric, treated w/ continued IVF @ 200/hr for 6 hours on 9/25. Patient was febrile during MICU course, however fever attributed to pancreatitis. Pt w/ leukocytosis (~11K), however non septic appearing. Blood and urine cultures remained negative. Antibiotics were not given; patient monitored. Insulin gtt turned off at 12PM. Pt transferred to [facility].      59yo M w/ [id number] (not on medication) p/w abdominal pain for 1 day a/w pancreatitis likely 2/2 hypertriglyceridemia. In the MICU, patient treated w/ IVF and placed on Insulin gtt. Initial TGs were > 300. Patient taken off insulin gtt when TGs were found to be < 500. 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 Vascular surgery was consulted and recommended ASA and mesenteric duplex. Endocrine was also consulted for uncontrolled T2DM (A1c 11.8, patient non-adherent w/ home metformin). Pt also oliguric, treated w/ continued IVF @ 200/hr for 6 hours on 9/25 with improvement. Patient was febrile during MICU course, however fever attributed to pancreatitis. Pt w/ leukocytosis (~11K), however non septic appearing. Blood and urine cultures remained negative. Antibiotics were not given; patient monitored. Pt transferred to [facility]. Pt started on Atorvastatin 20mg nightly, Fenofibrate 145 mg daily, and Lovaza 2g BID. Atorvastatin was increased to 80mg due to high ASCVD risk score. pt also started on Lantus 20 units daily, Humalog 6 units AC meals and Moderate dose correctional AC meals and qhs. Per Endocrine recs, pt to be discharged on on basal insulin plus orals such as metformin. Pt is medically stable and ready for discharge.     61yo M w/ [id number] (not on medication) p/w abdominal pain for 1 day a/w pancreatitis likely 2/2 hypertriglyceridemia. In the MICU, patient treated w/ IVF and placed on Insulin gtt. Initial TGs were > 300. Patient taken off insulin gtt when TGs were found to be < 500. 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 Vascular surgery was consulted and recommended ASA and mesenteric duplex. Endocrine was also consulted for uncontrolled T2DM (A1c 11.8, patient non-adherent w/ home metformin). Pt also oliguric, treated w/ continued IVF @ 200/hr for 6 hours on 9/25 with improvement. Patient was febrile during MICU course, however fever attributed to pancreatitis. Pt w/ leukocytosis (~11K), however non septic appearing. Blood and urine cultures remained negative. Antibiotics were not given; patient monitored. Pt transferred to [facility]. Pt started on Atorvastatin 20mg nightly, Fenofibrate 145 mg daily, and Lovaza 2g BID. Atorvastatin was increased to 80mg due to high ASCVD risk score. pt also started on Lantus 20 units daily, Humalog 6 units AC meals and Moderate dose correctional AC meals and qhs. Per Endocrine recs, pt to be discharged on on basal insulin plus orals such as metformin. Pt is medically stable and ready for discharge.      61yo M w/ [id number] (not on medication) p/w abdominal pain for 1 day a/w pancreatitis likely 2/2 hypertriglyceridemia. In the MICU, patient treated w/ IVF and placed on Insulin gtt. Initial TGs were > 300. Patient taken off insulin gtt when TGs were found to be < 500. 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 Vascular surgery was consulted and recommended ASA and mesenteric duplex. Endocrine was also consulted for uncontrolled T2DM (A1c 11.8, patient non-adherent w/ home metformin). Pt also oliguric, treated w/ continued IVF @ 200/hr for 6 hours on 9/25 with improvement. Patient was febrile during MICU course, however fever attributed to pancreatitis. Pt w/ leukocytosis (~11K), however non septic appearing. Blood and urine cultures remained negative. Antibiotics were not given; patient monitored. Pt transferred to [facility]. Pt started on Atorvastatin 20mg nightly, Fenofibrate 145 mg daily, and Lovaza 2g BID. Atorvastatin was increased to 80mg due to high ASCVD risk score. pt also started on Lantus 20 units daily, Humalog 6 units AC meals and Moderate dose correctional AC meals and qhs. Per Endocrine recs, pt to be discharged on on basal insulin Lantus 20 units plus metformin BID. The vascular surgery team recommended inpt mesenteric duplex to establish a baseline. No urgent surgical intervention was planned. Although multiple attempts by the primary team and vascular team were made to have US  expedited and performed on 9/27, vascular lab could not expedite it sooner than 9/28.  The pt expressed desire to have it done outpatient and was not willing to stay to complete it. He verbalized understanding the risks. His pain improved and was tolerating po intake. Mr. Mitchell was able to demonstrate injecting his insulin to the RN. He is medically stable and instructed to follow up with his PMD, Endocrine and Vascular surgery within 1 week.

## 2020-09-27 NOTE — PROGRESS NOTE ADULT - PROBLEM SELECTOR PLAN 3
Nonadherent to home metformin, A1c 11.8  -Endo on board, c/w Lantus 20u QHS + Humalog 6u TIDAC + ISS Nonadherent to home metformin, A1c 11.8  -Endo on board, c/w Lantus 20u QHS + Humalog 6u TIDAC + ISS  -Initiate diabetic teaching  -f/u Endo recs for basal and oral medications for pt to be discharged on

## 2020-09-27 NOTE — PROGRESS NOTE ADULT - REASON FOR ADMISSION
Pancreatitis 2/2 hypertriglyceridemia

## 2020-09-27 NOTE — DISCHARGE NOTE PROVIDER - CARE PROVIDER_API CALL
Dinora Tucker  INFECTIOUS DISEASE  [address], [address]  [place] [address]  Phone: [phone]  Fax: [phone]  Follow Up Time:     Andrey Lambert  SURGERY  [address], [address]  [place] [address]  Phone: [phone]  Fax: [phone]  Follow Up Time:    Dinora Tucker  INFECTIOUS DISEASE  [address], [address]  [place] [address]  Phone: [phone]  Fax: [phone]  Follow Up Time:     Suzette Valdez  SURGERY  [address], [address]  [place] [address]  Phone: [phone]  Fax: [phone]  Follow Up Time:

## 2020-09-28 PROBLEM — E11.9 TYPE 2 DIABETES MELLITUS WITHOUT COMPLICATIONS: Chronic | Status: ACTIVE | Noted: 2020-09-23

## 2020-09-29 LAB
CULTURE RESULTS: SIGNIFICANT CHANGE UP
SPECIMEN SOURCE: SIGNIFICANT CHANGE UP

## 2020-10-15 ENCOUNTER — APPOINTMENT (OUTPATIENT)
Dept: INTERNAL MEDICINE | Facility: CLINIC | Age: 60
End: 2020-10-15
Payer: MEDICAID

## 2020-10-15 VITALS
WEIGHT: 219 LBS | SYSTOLIC BLOOD PRESSURE: 126 MMHG | OXYGEN SATURATION: 97 % | DIASTOLIC BLOOD PRESSURE: 80 MMHG | HEART RATE: 93 BPM | BODY MASS INDEX: 30.54 KG/M2 | TEMPERATURE: 98.4 F

## 2020-10-15 PROCEDURE — 99214 OFFICE O/P EST MOD 30 MIN: CPT

## 2020-10-15 RX ORDER — PNV NO.95/FERROUS FUM/FOLIC AC 28MG-0.8MG
TABLET ORAL
Refills: 0 | Status: ACTIVE | COMMUNITY
Start: 2020-10-15

## 2020-10-15 NOTE — REVIEW OF SYSTEMS
[Negative] : Psychiatric [Fever] : no fever [Chills] : no chills [Fatigue] : no fatigue [Night Sweats] : no night sweats [Chest Pain] : no chest pain [Palpitations] : no palpitations [Lower Ext Edema] : no lower extremity edema [Orthopena] : no orthopnea [Constipation] : no constipation [Diarrhea] : no diarrhea [Vomiting] : no vomiting [Heartburn] : no heartburn [Headache] : no headache [Dizziness] : no dizziness [FreeTextEntry7] : abdominal pain initially, diarrhea initially now resolved

## 2020-10-15 NOTE — HISTORY OF PRESENT ILLNESS
[Admitted on: ___] : The patient was admitted on [unfilled] [Post-hospitalization from ___ Hospital] : Post-hospitalization from [unfilled] Hospital [Discharged on ___] : discharged on [unfilled] [Pertinent Labs] : pertinent labs [Discharge Summary] : discharge summary [Discharge Med List] : discharge medication list [Radiology Findings] : radiology findings [Med Reconciliation] : medication reconciliation has been completed [FreeTextEntry2] : Hospital Course: 59yo M w/ [id number] (not on medication) p/w abdominal pain for 1 day a/w pancreatitis likely 2/2 hypertriglyceridemia. In the MICU, patient treated w/ IVF and placed on Insulin gtt. Initial TGs were > 300. Patient taken off insulin gtt when TGs were found to be < 500. 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 Vascular surgery was consulted and recommended ASA and mesenteric duplex. Endocrine was also consulted for uncontrolled T2DM (A1c 11.8, patient non-adherent w/ home metformin). Pt also oliguric, treated w/ continued IVF @ 200/hr for 6 hours on 9/25 with improvement. Patient was febrile during MICU course, however fever attributed to pancreatitis. Pt w/ leukocytosis (~11K), however non septic appearing. Blood and urine cultures remained negative. Antibiotics were not given; patient monitored. Pt transferred to [facility]. Pt started on Atorvastatin 20mg nightly, Fenofibrate 145 mg daily, and Lovaza 2g BID. Atorvastatin was increased to 80mg due to high ASCVD risk score. pt also started on Lantus 20 units daily, Humalog 6 units AC meals and Moderate dose correctional AC meals and qhs. Per Endocrine recs, pt to be discharged on on basal insulin Lantus 20 units plus metformin BID. The vascular surgery team recommended inpt mesenteric duplex to establish a baseline. No urgent surgical intervention was planned. Although multiple attempts by the primary team and vascular team were made to have US expedited and performed on 9/27, vascular lab could not expedite it sooner than 9/28. The pt expressed desire to have it done outpatient and was not willing to stay to complete it. He verbalized understanding the risks. His pain improved and was tolerating po intake. Mr. Mitchell was able to demonstrate injecting his insulin to the RN. He is medically stable and instructed to follow up with his PMD, Endocrine and Vascular surgery within 1 week. \par Discharge Medications:\par aspirin 81 mg oral delayed release tablet: 1 tab(s) orally once a day \par atorvastatin 80 mg oral tablet: 1 tab(s) orally once a day (at bedtime) \par fenofibrate 145 mg oral tablet: 1 tab(s) orally once a day \par folic acid 1 mg oral tablet: 1 tab(s) orally once a day \par Lantus 100 units/mL subcutaneous solution: 20 unit(s) subcutaneous once a day \par (at bedtime) \par metFORMIN 500 mg oral tablet: 1 tab(s) orally 2 times a day for the first week then increase to 2 tabs a day. \par Multiple Vitamins oral tablet: 1 tab(s) orally once a day \par omega-3 polyunsaturated fatty acids ethyl esters 1000 mg oral capsule: 2 cap(s) orally 2 times a day \par thiamine 100 mg oral tablet: 1 tab(s) orally once a day \par alcohol swabs: Apply topically to affected area 4 times a day \par glucometer (per patient's insurance): Test blood sugars four times a day. \par test strips (per patient's insurance): 1 application subcutaneously 4 times a day.\par lancets: 1 application subcutaneously 4 times a day \par Insulin Pen Needles, 4mm: 1 application subcutaneously 4 times a day. ** Use with insulin pen ** \par glucose tablets: Follow instructions on bottle when sugar is low. \par  [de-identified] : Feeling well. \par Had some abdominal pain and diarrhea shortly after discharge, but that has resolved.  No fever/chills, no night sweats, no nausea, no vomiting.  Normal appetite, regular bowel movements. \par \par Has been checking BG regularly.  3-4x/day. \par Fasting numbers 128-172.\par Evening (prior to dinner) 138-185\par Bedtime numbers 133-286\par \par Smokes about 1/2 pack/day, down from 1ppd prior to hospitalization.  Not interested in assistance to quit at this time.   \par \par Prior to hospitalization admits to eating lots of pasta, meats, fried foods, carbs, some take out, some at home.  \par Now trying to eat much healthier.  Wife and he cook at home.  \par Works from home as a , lives with family, . \par \par Scheduled with vascular on 10/26/20.  \par Has not scheduled with endo yet, needs referral.  \par \par IV sites do not seem to be healing.  No oozing, no pain.

## 2020-10-15 NOTE — PHYSICAL EXAM
[Normal Voice/Communication] : normal voice/communication [No Carotid Bruits] : no carotid bruits [Pedal Pulses Present] : the pedal pulses are present [No Edema] : there was no peripheral edema [Normal] : normal gait, coordination grossly intact, no focal deficits and deep tendon reflexes were 2+ and symmetric

## 2020-10-21 LAB
ALBUMIN SERPL ELPH-MCNC: 4.8 G/DL
ALP BLD-CCNC: 48 U/L
ALT SERPL-CCNC: 19 U/L
AMYLASE/CREAT SERPL: 174 U/L
ANION GAP SERPL CALC-SCNC: 15 MMOL/L
AST SERPL-CCNC: 20 U/L
BASOPHILS # BLD AUTO: 0.08 K/UL
BASOPHILS NFR BLD AUTO: 1.3 %
BILIRUB SERPL-MCNC: 0.5 MG/DL
BUN SERPL-MCNC: 17 MG/DL
CALCIUM SERPL-MCNC: 10.2 MG/DL
CHLORIDE SERPL-SCNC: 102 MMOL/L
CHOLEST SERPL-MCNC: 107 MG/DL
CHOLEST/HDLC SERPL: 3.2 RATIO
CO2 SERPL-SCNC: 22 MMOL/L
CREAT SERPL-MCNC: 0.92 MG/DL
EOSINOPHIL # BLD AUTO: 0.25 K/UL
EOSINOPHIL NFR BLD AUTO: 4.2 %
FOLATE SERPL-MCNC: >20 NG/ML
GLUCOSE SERPL-MCNC: 172 MG/DL
HCT VFR BLD CALC: 43.9 %
HDLC SERPL-MCNC: 34 MG/DL
HGB BLD-MCNC: 13.6 G/DL
IMM GRANULOCYTES NFR BLD AUTO: 0.2 %
LDLC SERPL CALC-MCNC: 37 MG/DL
LPL SERPL-CCNC: 63 U/L
LYMPHOCYTES # BLD AUTO: 1.82 K/UL
LYMPHOCYTES NFR BLD AUTO: 30.5 %
MAN DIFF?: NORMAL
MCHC RBC-ENTMCNC: 28.6 PG
MCHC RBC-ENTMCNC: 31 GM/DL
MCV RBC AUTO: 92.2 FL
MONOCYTES # BLD AUTO: 0.42 K/UL
MONOCYTES NFR BLD AUTO: 7 %
NEUTROPHILS # BLD AUTO: 3.39 K/UL
NEUTROPHILS NFR BLD AUTO: 56.8 %
PLATELET # BLD AUTO: 277 K/UL
POTASSIUM SERPL-SCNC: 4.3 MMOL/L
PROT SERPL-MCNC: 7.5 G/DL
RBC # BLD: 4.76 M/UL
RBC # FLD: 13.2 %
SODIUM SERPL-SCNC: 139 MMOL/L
TRIGL SERPL-MCNC: 186 MG/DL
VIT B1 SERPL-MCNC: 137.7 NMOL/L
VIT B12 SERPL-MCNC: 349 PG/ML
WBC # FLD AUTO: 5.97 K/UL

## 2020-10-21 RX ORDER — FOLIC ACID 1 MG/1
1 TABLET ORAL
Qty: 90 | Refills: 0 | Status: DISCONTINUED | COMMUNITY
Start: 2020-10-15 | End: 2020-10-21

## 2020-10-26 ENCOUNTER — APPOINTMENT (OUTPATIENT)
Dept: VASCULAR SURGERY | Facility: CLINIC | Age: 60
End: 2020-10-26
Payer: MEDICAID

## 2020-10-26 VITALS
HEIGHT: 70 IN | SYSTOLIC BLOOD PRESSURE: 114 MMHG | BODY MASS INDEX: 31.35 KG/M2 | WEIGHT: 219 LBS | TEMPERATURE: 98.2 F | DIASTOLIC BLOOD PRESSURE: 81 MMHG | HEART RATE: 75 BPM

## 2020-10-26 PROCEDURE — 93976 VASCULAR STUDY: CPT

## 2020-10-26 PROCEDURE — 99213 OFFICE O/P EST LOW 20 MIN: CPT

## 2020-10-26 PROCEDURE — 99072 ADDL SUPL MATRL&STAF TM PHE: CPT

## 2020-10-26 NOTE — HISTORY OF PRESENT ILLNESS
[FreeTextEntry1] : Mr. Mitchell is a 60-year old gentleman who presents in follow up for celiac artery dissection. He was hospitalized at [facility] at the end of September for pancreatitis (likely related to elevated triglycerides) and underwent a CTA (9/24/2020) which demonstrated celiac dissection extending into the proximal splenic artery. The SMA was widely patent.\par \par Since discharge from the hospital, Mr. Mitchell is doing well. All prior abdominal pain has resolved. He is tolerating a regular diet and moving his bowels. He is following up with GI and IM. Unfortunately, he continues to smoke cigarettes (1/2 pack/day). He is taking Aspirin 81mg daily, as instructed.\par \par PMH: HLD, obesity, PINKY, DMII\par \par Medications: Aspirin, Lipitor, Vitamins, Lantus, Metformin\par \par All: PCN\par \par SH: as above\par \par FH: NC

## 2020-10-26 NOTE — PHYSICAL EXAM
[Normal Breath Sounds] : Normal breath sounds [Normal Heart Sounds] : normal heart sounds [de-identified] : NAD [de-identified] : WNL [de-identified] : The abdomen is obese. Non-tender. Non-distended.

## 2020-10-26 NOTE — ASSESSMENT
[FreeTextEntry1] : A mesenteric duplex showed patent celiac and SMA, without flow-limiting lesions. Dissection could not be visualized.\par \par We will obtain a follow up CTA in December. He should continue Aspirin 81mg daily.

## 2020-11-06 ENCOUNTER — RESULT CHARGE (OUTPATIENT)
Age: 60
End: 2020-11-06

## 2020-11-06 ENCOUNTER — APPOINTMENT (OUTPATIENT)
Dept: ENDOCRINOLOGY | Facility: CLINIC | Age: 60
End: 2020-11-06
Payer: MEDICAID

## 2020-11-06 VITALS — WEIGHT: 225 LBS | HEIGHT: 70 IN | BODY MASS INDEX: 32.21 KG/M2

## 2020-11-06 PROCEDURE — 99072 ADDL SUPL MATRL&STAF TM PHE: CPT

## 2020-11-06 PROCEDURE — G0108 DIAB MANAGE TRN  PER INDIV: CPT

## 2020-11-09 ENCOUNTER — RX RENEWAL (OUTPATIENT)
Age: 60
End: 2020-11-09

## 2020-11-17 ENCOUNTER — APPOINTMENT (OUTPATIENT)
Dept: ENDOCRINOLOGY | Facility: CLINIC | Age: 60
End: 2020-11-17

## 2020-12-14 LAB
ALBUMIN SERPL ELPH-MCNC: 4.7 G/DL
ALP BLD-CCNC: 38 U/L
ALT SERPL-CCNC: 27 U/L
AMYLASE/CREAT SERPL: 103 U/L
ANION GAP SERPL CALC-SCNC: 13 MMOL/L
AST SERPL-CCNC: 25 U/L
BILIRUB SERPL-MCNC: 0.5 MG/DL
BUN SERPL-MCNC: 14 MG/DL
CALCIUM SERPL-MCNC: 9.7 MG/DL
CHLORIDE SERPL-SCNC: 101 MMOL/L
CHOLEST SERPL-MCNC: 88 MG/DL
CO2 SERPL-SCNC: 27 MMOL/L
CREAT SERPL-MCNC: 1.02 MG/DL
ESTIMATED AVERAGE GLUCOSE: 169 MG/DL
GLUCOSE SERPL-MCNC: 215 MG/DL
HBA1C MFR BLD HPLC: 7.5 %
HDLC SERPL-MCNC: 44 MG/DL
LDLC SERPL CALC-MCNC: NORMAL MG/DL
LPL SERPL-CCNC: 51 U/L
NONHDLC SERPL-MCNC: 44 MG/DL
POTASSIUM SERPL-SCNC: 4 MMOL/L
PROT SERPL-MCNC: 7.1 G/DL
SODIUM SERPL-SCNC: 141 MMOL/L
TRIGL SERPL-MCNC: 232 MG/DL

## 2021-01-12 ENCOUNTER — APPOINTMENT (OUTPATIENT)
Dept: GASTROENTEROLOGY | Facility: CLINIC | Age: 61
End: 2021-01-12
Payer: MEDICAID

## 2021-01-12 VITALS
TEMPERATURE: 97.3 F | WEIGHT: 223 LBS | BODY MASS INDEX: 31.92 KG/M2 | HEART RATE: 87 BPM | DIASTOLIC BLOOD PRESSURE: 86 MMHG | HEIGHT: 70 IN | SYSTOLIC BLOOD PRESSURE: 124 MMHG

## 2021-01-12 PROCEDURE — 99214 OFFICE O/P EST MOD 30 MIN: CPT

## 2021-01-12 PROCEDURE — 82274 ASSAY TEST FOR BLOOD FECAL: CPT | Mod: QW

## 2021-01-12 PROCEDURE — 99072 ADDL SUPL MATRL&STAF TM PHE: CPT

## 2021-01-12 NOTE — CONSULT LETTER
[Dear  ___] : Dear  [unfilled], [Consult Letter:] : I had the pleasure of evaluating your patient, [unfilled]. [( Thank you for referring [unfilled] for consultation for _____ )] : Thank you for referring [unfilled] for consultation for [unfilled] [Please see my note below.] : Please see my note below. [Consult Closing:] : Thank you very much for allowing me to participate in the care of this patient.  If you have any questions, please do not hesitate to contact me. [Sincerely,] : Sincerely, [FreeTextEntry3] : Fredis Couch MD

## 2021-01-12 NOTE — PHYSICAL EXAM
[General Appearance - Alert] : alert [General Appearance - In No Acute Distress] : in no acute distress [General Appearance - Well Developed] : well developed [Sclera] : the sclera and conjunctiva were normal [PERRL With Normal Accommodation] : pupils were equal in size, round, and reactive to light [Strabismus] : no strabismus was seen [Outer Ear] : the ears and nose were normal in appearance [Examination Of The Oral Cavity] : the lips and gums were normal [Both Tympanic Membranes Were Examined] : both tympanic membranes were normal [Nasal Cavity] : the nasal mucosa and septum were normal [Oropharynx] : the oropharynx was normal [Neck Appearance] : the appearance of the neck was normal [Neck Cervical Mass (___cm)] : no neck mass was observed [Jugular Venous Distention Increased] : there was no jugular-venous distention [Thyroid Diffuse Enlargement] : the thyroid was not enlarged [Thyroid Nodule] : there were no palpable thyroid nodules [Auscultation Breath Sounds / Voice Sounds] : lungs were clear to auscultation bilaterally [Lungs Percussion] : the lungs were normal to percussion [Heart Rate And Rhythm] : heart rate was normal and rhythm regular [Heart Sounds] : normal S1 and S2 [Heart Sounds Gallop] : no gallops [Murmurs] : no murmurs [Heart Sounds Pericardial Friction Rub] : no pericardial rub [Arterial Pulses Carotid] : carotid pulses were normal with no bruits [Abdominal Aorta] : the abdominal aorta was normal [Edema] : there was no peripheral edema [Full Pulse] : the pedal pulses are present [Bowel Sounds] : normal bowel sounds [Abdomen Soft] : soft [Abdomen Tenderness] : non-tender [Abdomen Mass (___ Cm)] : no abdominal mass palpated [Abdomen Hernia] : no hernia was discovered [Normal Sphincter Tone] : normal sphincter tone [No Rectal Mass] : no rectal mass [Occult Blood Positive] : stool was negative for occult blood [FreeTextEntry1] : Minimal stool which was Hemoccult and iFOBT negative [Penis Abnormality] : the penis was normal [Scrotum] : the scrotum was normal [Testes Swelling] : the testicles were not swollen [Testes Mass (___cm)] : there were no testicular masses [Cervical Lymph Nodes Enlarged Posterior Bilaterally] : posterior cervical [Cervical Lymph Nodes Enlarged Anterior Bilaterally] : anterior cervical [Supraclavicular Lymph Nodes Enlarged Bilaterally] : supraclavicular [Femoral Lymph Nodes Enlarged Bilaterally] : femoral [Axillary Lymph Nodes Enlarged Bilaterally] : axillary [Inguinal Lymph Nodes Enlarged Bilaterally] : inguinal [No CVA Tenderness] : no ~M costovertebral angle tenderness [No Spinal Tenderness] : no spinal tenderness [Abnormal Walk] : normal gait [Nail Clubbing] : no clubbing  or cyanosis of the fingernails [Involuntary Movements] : no involuntary movements were seen [Musculoskeletal - Swelling] : no joint swelling seen [Motor Tone] : muscle strength and tone were normal [Skin Color & Pigmentation] : normal skin color and pigmentation [Skin Turgor] : normal skin turgor [] : no rash [No Focal Deficits] : no focal deficits [Oriented To Time, Place, And Person] : oriented to person, place, and time [Impaired Insight] : insight and judgment were intact [Affect] : the affect was normal [Mood] : the mood was normal

## 2021-01-12 NOTE — ASSESSMENT
[FreeTextEntry1] : 1.  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  History of rectal bleeding most likely secondary to internal hemorrhoids-colonoscopy June 24, 2020 revealed a redundant colon and internal hemorrhoids; colonoscopy December 15, 2014 revealed internal hemorrhoids.\par 3.  Type 2 diabetes with proteinuria.\par 4.  Hyperlipidemia.\par 5.  Obesity.\par 6.  Fatty liver.\par 7.  Obstructive sleep apnea.\par \par Plan:\par 1.  The patient was cautioned against use of alcohol.\par 2.  The patient was urged to keep his diabetes under control and maintain his hemoglobin A1c to reasonable levels along with his lipid profile.\par 3.  If stable, return in 1 year.

## 2021-01-12 NOTE — HISTORY OF PRESENT ILLNESS
[FreeTextEntry1] : In September 2020, Ellis was admitted to [facility] with severe abdominal pain secondary to acute pancreatitis.  The cause for the pancreatitis was determined to be hypertriglyceridemia, as there was no evidence for gallstones or alcohol related disease.  The patient had severe abdominal pain for a few days and then gradually his pain subsided over a few weeks.  Sonogram showed hepatic steatosis.  CT scan revealed acute interstitial edematous pancreatitis and there was a dissection involving the proximal celiac artery, which was patent.  A CT angiogram following day revealed interval progression of the acute pancreatitis without evidence for pancreatic necrosis or fluid collection and the proximal celiac artery dissection extended to the proximal splenic artery and was unchanged.  Since discharge from the hospital, the patient's diabetes has been controlled with insulin and Metformin and his hyperlipidemia has been controlled with fenofibrate and atorvastatin.  His hemoglobin A1c is down to 7.5, and his amylase and lipase are normal.  Colonoscopy June 24, 2020 revealed a redundant colon and internal hemorrhoids.

## 2021-01-15 ENCOUNTER — NON-APPOINTMENT (OUTPATIENT)
Age: 61
End: 2021-01-15

## 2021-02-16 ENCOUNTER — APPOINTMENT (OUTPATIENT)
Dept: ENDOCRINOLOGY | Facility: CLINIC | Age: 61
End: 2021-02-16
Payer: MEDICAID

## 2021-02-16 PROCEDURE — 99203 OFFICE O/P NEW LOW 30 MIN: CPT | Mod: 95

## 2021-02-16 NOTE — PHYSICAL EXAM
[Alert] : alert [Well Nourished] : well nourished [No Acute Distress] : no acute distress [Well Developed] : well developed [Normal Sclera/Conjunctiva] : normal sclera/conjunctiva [EOMI] : extra ocular movement intact [No Proptosis] : no proptosis [Normal Oropharynx] : the oropharynx was normal [Thyroid Not Enlarged] : the thyroid was not enlarged [No Respiratory Distress] : no respiratory distress [No Accessory Muscle Use] : no accessory muscle use [Normal Rate] : heart rate was normal [No Edema] : no peripheral edema [Not Distended] : not distended [Soft] : abdomen soft [Spine Straight] : spine straight [No Stigmata of Cushings Syndrome] : no stigmata of Cushings Syndrome [Normal Gait] : normal gait [No Involuntary Movements] : no involuntary movements were seen [No Motor Deficits] : the motor exam was normal [No Tremors] : no tremors [Oriented x3] : oriented to person, place, and time [Normal Affect] : the affect was normal [Recent Memory Normal] : recent memory was not impaired [Normal Insight/Judgement] : insight and judgment were intact [Normal Mood] : the mood was normal [Remote Memory Normal] : remote memory was not impaired [Acanthosis Nigricans] : no acanthosis nigricans

## 2021-02-16 NOTE — ASSESSMENT
[Diabetes Foot Care] : diabetes foot care [Long Term Vascular Complications] : long term vascular complications of diabetes [Carbohydrate Consistent Diet] : carbohydrate consistent diet [Importance of Diet and Exercise] : importance of diet and exercise to improve glycemic control, achieve weight loss and improve cardiovascular health [Exercise/Effect on Glucose] : exercise/effect on glucose [Action and use of Insulin] : action and use of short and long-acting insulin [Self Monitoring of Blood Glucose] : self monitoring of blood glucose [Insulin Self-Administration] : insulin self-administration [Retinopathy Screening] : Patient was referred to ophthalmology for retinopathy screening [Weight Loss] : weight loss [Diabetic Medications] : Risks and benefits of diabetic medications were discussed [FreeTextEntry1] : 61 y/o male with T2DM, HLD and Hypertriglyceridemia and microalbuminruia\par \par T2DM\par - I had a lengthy discussion regarding healthy diet (consistent carbohydrates and low calorie content) with the patient. I also emphasized to maintain routine exercise activity (30 minutes daily or 150 minutes in the week).\par - I discussed the importance of avoiding mild hypoglycemia (FS<70 mg/dl) and severe hypoglycemia (FS<55 mg/dl) with the patient. I also discussed hypoglycemia correction with 4 oz of juice or 4 glucose tablets and rechecking FS in 15 minutes. If FS is still low, repeat 4oz juice or 4 glucose tablets and consume 12 grams of carbohydrates.\par - Continue Metformin 1000 mg BID and Lantus 20 units QHS\par - Start Jardiance 10 mg QD, discussed risks and benefits\par - Cont checking FS 2-3x/day in a staggered manner for goal \par - Labs in March, including urine microalbumin\par - Recommend annual dilated eye exam is recommended\par \par HLD and Hypertriglyceridemia\par - I had a lengthy discussion regarding healthy diet (consistent carbohydrates and low calorie content) with the patient. I also emphasized to maintain routine exercise activity (30 minutes daily or 150 minutes in the week).\par - Continue Atorvastatin, Fenofibrate, Fish Oil as prescribed\par - Recommend alcohol use in moderation\par \par Microalbuminuria\par - Will check urine microalbumin levels in March\par \par F/u in office in 3-4 months\par \par

## 2021-02-16 NOTE — REASON FOR VISIT
[Home] : at home, [unfilled] , at the time of the visit. [Other Location: e.g. Home (Enter Location, City,State)___] : at [unfilled] [Verbal consent obtained from patient] : the patient, [unfilled] [Initial Evaluation] : an initial evaluation [DM Type 2] : DM Type 2 [Weight Management/Obesity] : weight management/obesity

## 2021-02-16 NOTE — HISTORY OF PRESENT ILLNESS
[FreeTextEntry1] : 61 y/o male for management of T2DM (A1c 7.5% in Dec 2020) and hypertriglyceridemia induced pancreatitis\par \par DM was diagnosed maybe 2-3 years ago\par Complications include: no neuropathy, last dilated eye exam was less than 5 years ago, no known nephropathy. No MI or CVA\par Hospitalizations: 2020 for pancreatitis (hypertriglyceridemia)\par Current DM Medications/Insulin: Metformin 1000 mg BID, Basaglar 20 units QHS\par Current Fingerstick Glucose Logs: Checks 2-3x/day\par Fastin-150, occ 100\par Pre-Lunch: 140-190\par Pre-Dinner: 200s\par Bedtime: 170-240\par No hypoglycemia\par \par Current Diet Includes: Mostly order-in\par Breakfast - 10am or so - 2 toasts, maybe some eggs, coffee\par Lunch: 4-6pm - pizza, chinese food, sandwiches, sometimes fried chicken from chinese rest\par Dinner: 10 pm - pasta with meat sauce, steaks, rice and beans\par Snacks: no daytime snacks. May have bedtime crackers\par Drinks: No soda or juice. Admits to having wine, beer -- 2-3x/week\par \par HLD and Hypertriglyceridemia\par Taking Atorvastatin 40 mg QHS and Fenofibrate 145 mg QD, Omega 3, 2 caps BID\par \par Does not exercise yet but has a bicycle\par \par

## 2021-04-07 RX ORDER — EMPAGLIFLOZIN 10 MG/1
10 TABLET, FILM COATED ORAL DAILY
Qty: 1 | Refills: 3 | Status: DISCONTINUED | COMMUNITY
Start: 2021-02-16 | End: 2021-04-07

## 2021-04-14 ENCOUNTER — NON-APPOINTMENT (OUTPATIENT)
Age: 61
End: 2021-04-14

## 2021-04-14 LAB
CHOLEST SERPL-MCNC: 114 MG/DL
ESTIMATED AVERAGE GLUCOSE: 180 MG/DL
HBA1C MFR BLD HPLC: 7.9 %
HDLC SERPL-MCNC: 36 MG/DL
LDLC SERPL CALC-MCNC: 29 MG/DL
NONHDLC SERPL-MCNC: 78 MG/DL
TRIGL SERPL-MCNC: 246 MG/DL

## 2021-04-15 ENCOUNTER — RX RENEWAL (OUTPATIENT)
Age: 61
End: 2021-04-15

## 2021-04-16 ENCOUNTER — NON-APPOINTMENT (OUTPATIENT)
Age: 61
End: 2021-04-16

## 2021-04-16 ENCOUNTER — APPOINTMENT (OUTPATIENT)
Dept: ENDOCRINOLOGY | Facility: CLINIC | Age: 61
End: 2021-04-16

## 2021-04-28 LAB
C PEPTIDE SERPL-MCNC: 3.2 NG/ML
CHOLEST SERPL-MCNC: 107 MG/DL
CREAT SPEC-SCNC: 106 MG/DL
ESTIMATED AVERAGE GLUCOSE: 177 MG/DL
FRUCTOSAMINE SERPL-MCNC: 344 UMOL/L
HBA1C MFR BLD HPLC: 7.8 %
HDLC SERPL-MCNC: 35 MG/DL
LDLC SERPL CALC-MCNC: 24 MG/DL
MICROALBUMIN 24H UR DL<=1MG/L-MCNC: 6.3 MG/DL
MICROALBUMIN/CREAT 24H UR-RTO: 59 MG/G
NONHDLC SERPL-MCNC: 72 MG/DL
TRIGL SERPL-MCNC: 239 MG/DL

## 2021-05-02 NOTE — ED ADULT NURSE NOTE - CAS DISCH ACCOMP BY
Pt complains of chest pain that started after she got up to use the bathroom. Aspirin given by ems,   
EDT/Self/Transport/RN

## 2021-05-10 ENCOUNTER — APPOINTMENT (OUTPATIENT)
Dept: INTERNAL MEDICINE | Facility: CLINIC | Age: 61
End: 2021-05-10
Payer: MEDICAID

## 2021-05-10 VITALS
TEMPERATURE: 99.3 F | OXYGEN SATURATION: 96 % | DIASTOLIC BLOOD PRESSURE: 82 MMHG | WEIGHT: 238 LBS | BODY MASS INDEX: 34.15 KG/M2 | SYSTOLIC BLOOD PRESSURE: 126 MMHG | HEART RATE: 95 BPM

## 2021-05-10 PROCEDURE — 99214 OFFICE O/P EST MOD 30 MIN: CPT

## 2021-05-10 PROCEDURE — 99072 ADDL SUPL MATRL&STAF TM PHE: CPT

## 2021-05-10 RX ORDER — INSULIN GLARGINE 100 [IU]/ML
100 INJECTION, SOLUTION SUBCUTANEOUS
Qty: 1 | Refills: 3 | Status: DISCONTINUED | COMMUNITY
Start: 2020-10-15 | End: 2021-05-10

## 2021-05-10 RX ORDER — AZITHROMYCIN 250 MG/1
250 TABLET, FILM COATED ORAL
Qty: 6 | Refills: 0 | Status: DISCONTINUED | COMMUNITY
Start: 2021-05-04

## 2021-05-10 NOTE — ASSESSMENT
[FreeTextEntry1] : 60 y.o. male, PMHx diabetes, pancreatitis 2/2 hypertriglyceridemia, anxiety, depression, obesity, PINKY (uses CPAP), incidental finding of  celiac artery disection on abdominal imaging.  Presents for follow up.\par \par Diabetes:\par Improved control, A1C 7.9 last month.  Continues endo follow up. \par Remains on insulin glargine 20 units at night, metformin 1,000 twice daily, Steglatro 15 once daily.  \par Microalbuminuria.  Will repeat today.  Consider ACEi\par \par Hypertriglyceridemia:\par Remains elevated, but improved. \par Continues statin, Lovaza, fenofibrate\par \par H/o anxiety/depression:\par Not an issue at present\par \par Obesity:\par Dietary modification and exercise encourage.  \par Will work on finding time for exercise.\par \par PINKY:\par Has CPAP\par \par Celiac artery disection:\par Repeat CAT recommended by vascular, ordered today.\par Patient will scheduled vascular follow up.\par \par Recieved COVID-19 vaccination. \par Work clearance note signed (required every 6-12 months). \par

## 2021-05-10 NOTE — HISTORY OF PRESENT ILLNESS
[de-identified] : 60 y.o. male, PMHx diabetes, pancreatitis 2/2 hypertriglyceridemia, anxiety, depression, obesity, PINKY (uses CPAP), incidental finding of  celiac artery disection on abdominal imaging.  Presents for follow up.\par Needs letter if OK to work.  \par Feeling well. \par Endo follow up in February.  Follow up scheduled next month, June.  A1C has improved, 7.9 last month.  \par Triglycerides improved, 239 on labs last month.  Had gone up over 1,000.  Were \par Has been compliant with meds.  Trying to continue with improved diet.  Not exercising.    \par Mood has been good denies any current anxiety or depression.  \par Vascular follow up done in October, note reviewed - celiac artery disection not visualized on mesenteric duplex.  Advised to continue Aspirin and repeat CTA in December.  Repeat CTA not yet done.    \par Balanitis resolved.

## 2021-05-12 LAB
CREAT SPEC-SCNC: 67 MG/DL
MICROALBUMIN 24H UR DL<=1MG/L-MCNC: 2.7 MG/DL
MICROALBUMIN/CREAT 24H UR-RTO: 40 MG/G

## 2021-06-09 ENCOUNTER — APPOINTMENT (OUTPATIENT)
Dept: ENDOCRINOLOGY | Facility: CLINIC | Age: 61
End: 2021-06-09
Payer: MEDICAID

## 2021-06-09 PROCEDURE — 99214 OFFICE O/P EST MOD 30 MIN: CPT | Mod: 95

## 2021-06-28 ENCOUNTER — OUTPATIENT (OUTPATIENT)
Dept: OUTPATIENT SERVICES | Facility: HOSPITAL | Age: 61
LOS: 1 days | End: 2021-06-28
Payer: MEDICAID

## 2021-06-28 ENCOUNTER — RX RENEWAL (OUTPATIENT)
Age: 61
End: 2021-06-28

## 2021-06-28 ENCOUNTER — APPOINTMENT (OUTPATIENT)
Dept: CT IMAGING | Facility: IMAGING CENTER | Age: 61
End: 2021-06-28
Payer: MEDICAID

## 2021-06-28 DIAGNOSIS — I77.79 DISSECTION OF OTHER SPECIFIED ARTERY: ICD-10-CM

## 2021-06-28 PROCEDURE — 74174 CTA ABD&PLVS W/CONTRAST: CPT | Mod: 26

## 2021-06-28 PROCEDURE — 74174 CTA ABD&PLVS W/CONTRAST: CPT

## 2021-06-28 PROCEDURE — 82565 ASSAY OF CREATININE: CPT

## 2021-07-08 ENCOUNTER — RX RENEWAL (OUTPATIENT)
Age: 61
End: 2021-07-08

## 2021-10-12 ENCOUNTER — APPOINTMENT (OUTPATIENT)
Dept: INTERNAL MEDICINE | Facility: CLINIC | Age: 61
End: 2021-10-12
Payer: MEDICAID

## 2021-10-12 VITALS
HEART RATE: 89 BPM | WEIGHT: 234 LBS | SYSTOLIC BLOOD PRESSURE: 134 MMHG | DIASTOLIC BLOOD PRESSURE: 80 MMHG | OXYGEN SATURATION: 98 % | BODY MASS INDEX: 33.5 KG/M2 | TEMPERATURE: 98.8 F | HEIGHT: 70 IN

## 2021-10-12 DIAGNOSIS — M53.3 SACROCOCCYGEAL DISORDERS, NOT ELSEWHERE CLASSIFIED: ICD-10-CM

## 2021-10-12 PROCEDURE — 99214 OFFICE O/P EST MOD 30 MIN: CPT

## 2021-10-12 RX ORDER — CYCLOBENZAPRINE HYDROCHLORIDE 10 MG/1
10 TABLET, FILM COATED ORAL 3 TIMES DAILY
Qty: 30 | Refills: 0 | Status: COMPLETED | COMMUNITY
Start: 2021-10-12 | End: 2021-10-22

## 2021-10-12 NOTE — HISTORY OF PRESENT ILLNESS
[FreeTextEntry8] : 60 y.o. male, PMHx diabetes, pancreatitis 2/2 hypertriglyceridemia, anxiety, depression, obesity, PINKY (uses CPAP), incidental finding of celiac artery disection on abdominal imaging. 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  \par Has done massage with little relief.  Will be seeing chiropractor next week.  Did go a few weeks ago, which helped for only a day.  \par Can lie down in only certain positions without pain.  \par Especially bad in the mornings in the groin and buttock, but travels down the thigh as well.  Will get better after slowly stretching it out.  Bad also if sitting for a while (which is most days).  \par Not very active, but does feel better when moving around.  \par No noticeable leg weakness or numbness.

## 2021-10-12 NOTE — PHYSICAL EXAM
[Normal] : no CVA or spinal tenderness [No Joint Swelling] : no joint swelling [Grossly Normal Strength/Tone] : grossly normal strength/tone [de-identified] : mildly tender at left, lower outer back [de-identified] : tight hamstrings with poor ROM

## 2021-10-12 NOTE — ASSESSMENT
[FreeTextEntry1] : Back pain, left hip and groin pain:\par Back pain considerably improved\par Left hip and groin pain may be radicular, but more likely d/t muscle tightness of hip, groin and hamstrings\par Advised to avoid prolonged periods of sitting\par Regular walking every day\par Light stretching every day\par Warm compresses\par NSAID x 5 days, muscle relaxant at night and as needed when not driving\par Will schedule with PT\par Patient to call, RTO if no improvement\par \par Hyperlipidemia, diabetes:\par Check labs, continues meds\par Regular physical activity again encouraged\par \par Celiac artery disection:\par Stable on repeat imaging\par Reminded to schedule vascular follow up\par \par Declines flu shot

## 2021-10-13 LAB
ALBUMIN SERPL ELPH-MCNC: 4.8 G/DL
ALP BLD-CCNC: 30 U/L
ALT SERPL-CCNC: 34 U/L
ANION GAP SERPL CALC-SCNC: 12 MMOL/L
AST SERPL-CCNC: 26 U/L
BILIRUB SERPL-MCNC: 0.4 MG/DL
BUN SERPL-MCNC: 14 MG/DL
CALCIUM SERPL-MCNC: 10.3 MG/DL
CHLORIDE SERPL-SCNC: 103 MMOL/L
CHOLEST SERPL-MCNC: 116 MG/DL
CK SERPL-CCNC: 159 U/L
CO2 SERPL-SCNC: 26 MMOL/L
CREAT SERPL-MCNC: 1.05 MG/DL
ESTIMATED AVERAGE GLUCOSE: 180 MG/DL
GLUCOSE SERPL-MCNC: 310 MG/DL
HBA1C MFR BLD HPLC: 7.9 %
HDLC SERPL-MCNC: 39 MG/DL
LDLC SERPL CALC-MCNC: 33 MG/DL
NONHDLC SERPL-MCNC: 77 MG/DL
POTASSIUM SERPL-SCNC: 4.6 MMOL/L
PROT SERPL-MCNC: 7.1 G/DL
SODIUM SERPL-SCNC: 141 MMOL/L
TRIGL SERPL-MCNC: 222 MG/DL

## 2021-11-29 ENCOUNTER — APPOINTMENT (OUTPATIENT)
Dept: INTERNAL MEDICINE | Facility: CLINIC | Age: 61
End: 2021-11-29
Payer: MEDICAID

## 2021-11-29 PROCEDURE — 99212 OFFICE O/P EST SF 10 MIN: CPT | Mod: 95

## 2021-11-29 NOTE — HISTORY OF PRESENT ILLNESS
[Home] : at home, [unfilled] , at the time of the visit. [Medical Office: ([facility])___] : at the medical office located in  [FreeTextEntry8] : 60 y.o. male, PMHx diabetes, pancreatitis 2/2 hypertriglyceridemia, anxiety, depression, obesity, PINKY (uses CPAP), incidental finding of celiac artery disection on abdominal imaging. \par \par Diagnosed with pneumonia last week.  Taking abx x 7 days, last dose tonight.  Seen at urgent care, [place] [facility], chest x-ray done.  \par Presented with c/o headache, weakness, fever/chills, body aches.  No dyspnea, no cough, no URI symptoms.  Negative COVID tests x 5. \par Now with mild dry cough, but feeling overall well.  No longer with fever/chills, energy better.  \par

## 2021-11-29 NOTE — PHYSICAL EXAM
[No Acute Distress] : no acute distress [Well-Appearing] : well-appearing [Normal Voice/Communication] : normal voice/communication [No Respiratory Distress] : no respiratory distress

## 2021-11-29 NOTE — ASSESSMENT
[FreeTextEntry1] : Pneumonia:\par Complete antibiotic course prescribed at urgent care.  \par Feeling well.\par Will get f/u chest x-ray in 6 weeks.  Patient will call if any worsening or not continuing to improve.  \par

## 2021-11-29 NOTE — REVIEW OF SYSTEMS
[Cough] : cough [Negative] : Musculoskeletal [Shortness Of Breath] : no shortness of breath [Wheezing] : no wheezing

## 2021-12-13 LAB
M TB IFN-G BLD-IMP: NEGATIVE
QUANTIFERON TB PLUS MITOGEN MINUS NIL: 7.7 IU/ML
QUANTIFERON TB PLUS NIL: 0.03 IU/ML
QUANTIFERON TB PLUS TB1 MINUS NIL: 0.01 IU/ML
QUANTIFERON TB PLUS TB2 MINUS NIL: 0.01 IU/ML

## 2022-01-10 ENCOUNTER — APPOINTMENT (OUTPATIENT)
Dept: VASCULAR SURGERY | Facility: CLINIC | Age: 62
End: 2022-01-10

## 2022-01-21 ENCOUNTER — APPOINTMENT (OUTPATIENT)
Dept: ENDOCRINOLOGY | Facility: CLINIC | Age: 62
End: 2022-01-21
Payer: MEDICAID

## 2022-01-21 VITALS
HEART RATE: 92 BPM | SYSTOLIC BLOOD PRESSURE: 120 MMHG | DIASTOLIC BLOOD PRESSURE: 80 MMHG | BODY MASS INDEX: 32.64 KG/M2 | HEIGHT: 70 IN | TEMPERATURE: 97.6 F | OXYGEN SATURATION: 96 % | WEIGHT: 228 LBS

## 2022-01-21 LAB
GLUCOSE BLDC GLUCOMTR-MCNC: 189
HBA1C MFR BLD HPLC: 7.5

## 2022-01-21 PROCEDURE — 99214 OFFICE O/P EST MOD 30 MIN: CPT | Mod: 25

## 2022-01-21 PROCEDURE — 82962 GLUCOSE BLOOD TEST: CPT

## 2022-01-21 PROCEDURE — 95251 CONT GLUC MNTR ANALYSIS I&R: CPT

## 2022-01-21 PROCEDURE — 83036 HEMOGLOBIN GLYCOSYLATED A1C: CPT | Mod: QW

## 2022-01-27 LAB
CREAT SPEC-SCNC: 64 MG/DL
MICROALBUMIN 24H UR DL<=1MG/L-MCNC: 1.4 MG/DL
MICROALBUMIN/CREAT 24H UR-RTO: 22 MG/G

## 2022-01-30 ENCOUNTER — LABORATORY RESULT (OUTPATIENT)
Age: 62
End: 2022-01-30

## 2022-01-31 ENCOUNTER — APPOINTMENT (OUTPATIENT)
Dept: CARDIOLOGY | Facility: CLINIC | Age: 62
End: 2022-01-31
Payer: MEDICAID

## 2022-01-31 ENCOUNTER — NON-APPOINTMENT (OUTPATIENT)
Age: 62
End: 2022-01-31

## 2022-01-31 VITALS
HEIGHT: 70 IN | HEART RATE: 80 BPM | SYSTOLIC BLOOD PRESSURE: 126 MMHG | WEIGHT: 231 LBS | RESPIRATION RATE: 15 BRPM | BODY MASS INDEX: 33.07 KG/M2 | OXYGEN SATURATION: 96 % | TEMPERATURE: 98 F | DIASTOLIC BLOOD PRESSURE: 86 MMHG

## 2022-01-31 DIAGNOSIS — F17.200 NICOTINE DEPENDENCE, UNSPECIFIED, UNCOMPLICATED: ICD-10-CM

## 2022-01-31 PROCEDURE — 99203 OFFICE O/P NEW LOW 30 MIN: CPT | Mod: 25

## 2022-01-31 PROCEDURE — 99406 BEHAV CHNG SMOKING 3-10 MIN: CPT

## 2022-01-31 PROCEDURE — 93000 ELECTROCARDIOGRAM COMPLETE: CPT | Mod: 59

## 2022-01-31 RX ORDER — NAPROXEN 500 MG/1
500 TABLET ORAL
Qty: 10 | Refills: 0 | Status: COMPLETED | COMMUNITY
Start: 2021-10-12 | End: 2022-01-31

## 2022-01-31 NOTE — ASSESSMENT
[FreeTextEntry1] : This is a 61-year-old Male who presents to the office after being referred by Dr. Meier. Patient reports feeling well today. Denies any chest pain, palpitations, fatigue, claudication, or swelling of legs. Patient complains of shortness of breath when doing strenuous activities but states he does not get short of breath when performing ADLs or walking.  Patient smokes 1 pack per day for 40 years. Patient is seeing a dietician for his diabetes mellitus.  Patient works as a . Currently taking 12.5mg of Losartan daily. \par \par Patient has a history of diabetes mellitus and hyperlipidemia. \par \par No past surgical history. \par Family history- mom diabetes\par CRF- hld, dm, smoker\par \par Labs from 10/12/2021 Cholesterol 116, Triglyceride 222, HDL 39, LDL 33. \par \par DAVID Robert RN

## 2022-01-31 NOTE — REASON FOR VISIT
[CV Risk Factors and Non-Cardiac Disease] : CV risk factors and non-cardiac disease [Hyperlipidemia] : hyperlipidemia [FreeTextEntry3] : Dr. Meier [FreeTextEntry1] : This is a 61-year-old Male who presents to the office after being referred by Dr. Meier. Patient reports feeling well today. Denies any chest pain, palpitations, fatigue, claudication, or swelling of legs. Patient complains of shortness of breath when doing strenuous activities but states he does not get short of breath when performing ADLs or walking.  Patient smokes 1 pack per day for 40 years. Patient is seeing a dietician for his diabetes mellitus.  Patient works as a . Currently taking 12.5mg of Losartan daily. \par \par Patient has a history of diabetes mellitus and hyperlipidemia. \par \par No past surgical history. \par Family history- mom diabetes\par CRF- hld, dm, smoker\par \par Labs from 10/12/2021 Cholesterol 116, Triglyceride 222, HDL 39, LDL 33. \par

## 2022-01-31 NOTE — COUNSELING
[Yes] : Risk of tobacco use and health benefits of smoking cessation discussed: Yes [Cessation strategies including cessation program discussed] : Cessation strategies including cessation program discussed [Encouraged to pick a quit date and identify support needed to quit] : Encouraged to pick a quit date and identify support needed to quit [No] : Not willing to quit smoking [FreeTextEntry1] : 4

## 2022-01-31 NOTE — DISCUSSION/SUMMARY
[FreeTextEntry1] : This is a 61-year-old male with past medical history significant for insulin-dependent diabetes mellitus, hyperlipidemia, hypertension, who comes in for cardiac consultation.  He denies chest pain, dizziness, palpitations or syncope.  He does complain of dyspnea on exertion which can occur with walking longer distance on flat ground or walking uphill.  He was born in [place] and has no history of rheumatic fever.  He does not drink excessive caffeine or alcohol.\par His cardiac risk factors include hypertension, hyperlipidemia, insulin-dependent diabetes mellitus, and smoking 1 pack per day for 40 years. Patient is seeing a dietician for his diabetes mellitus.  Patient works as a . Currently taking 12.5mg of Losartan daily. \par Electrocardiogram done January 31, 2022 demonstrate normal sinus rhythm rate of 80 bpm is otherwise unremarkable.\par Blood work done October 12, 2021 demonstrated hemoglobin A1c of 7.9, cholesterol 116 mg/dL, triglycerides 222 mg/dL, HDL of 39, LDL calculated 33 mg/dL, and non-HDL cholesterol 77 mg/dL.\par The patient will continue his current dose of Lipitor 40 mg/day.  His LDL target is less than 70 mg/dL.  Smoking cessation was discussed.\par He will schedule exercise stress test to rule out significant coronary artery disease, and to evaluate his symptoms.\par He will schedule an echo Doppler examination to evaluate his murmur, left ventricular function, symptoms, chamber size, and rule out hypertrophy.\par The patient's blood pressure is slightly elevated today and I have asked him to change his losartan to 1 full tablet per day of 25 mg.  He is on aspirin 81 mg/day for primary prevention.\par Further recommendations were made after the above-noted diagnostic tests are completed.\par He will have new blood work done today for lipid panel and SMA-20.\par Family history- mom diabetes\par CRF- hld, dm, smoker\par \par Labs from 10/12/2021 Cholesterol 116, Triglyceride 222, HDL 39, LDL 33. \par

## 2022-01-31 NOTE — PHYSICAL EXAM
[Well Developed] : well developed [Well Nourished] : well nourished [No Acute Distress] : no acute distress [Normal Conjunctiva] : normal conjunctiva [Normal Venous Pressure] : normal venous pressure [No Carotid Bruit] : no carotid bruit [Normal S1, S2] : normal S1, S2 [No Rub] : no rub [No Gallop] : no gallop [5th Left ICS - MCL] : palpated at the 5th LICS in the midclavicular line [Normal Rate] : normal [Rhythm Regular] : regular [Normal S1] : normal S1 [Normal S2] : normal S2 [I] : a grade 1 [No Pitting Edema] : no pitting edema present [2+] : left 2+ [Clear Lung Fields] : clear lung fields [Good Air Entry] : good air entry [No Respiratory Distress] : no respiratory distress  [Soft] : abdomen soft [Non Tender] : non-tender [No Masses/organomegaly] : no masses/organomegaly [Normal Bowel Sounds] : normal bowel sounds [Normal Gait] : normal gait [No Edema] : no edema [No Cyanosis] : no cyanosis [No Clubbing] : no clubbing [No Varicosities] : no varicosities [No Rash] : no rash [No Skin Lesions] : no skin lesions [Moves all extremities] : moves all extremities [No Focal Deficits] : no focal deficits [Normal Speech] : normal speech [Alert and Oriented] : alert and oriented [Normal memory] : normal memory

## 2022-02-03 ENCOUNTER — APPOINTMENT (OUTPATIENT)
Dept: OPHTHALMOLOGY | Facility: CLINIC | Age: 62
End: 2022-02-03
Payer: MEDICAID

## 2022-02-03 ENCOUNTER — NON-APPOINTMENT (OUTPATIENT)
Age: 62
End: 2022-02-03

## 2022-02-03 PROCEDURE — 99204 OFFICE O/P NEW MOD 45 MIN: CPT

## 2022-02-03 PROCEDURE — 92133 CPTRZD OPH DX IMG PST SGM ON: CPT

## 2022-02-09 ENCOUNTER — NON-APPOINTMENT (OUTPATIENT)
Age: 62
End: 2022-02-09

## 2022-02-11 ENCOUNTER — RX RENEWAL (OUTPATIENT)
Age: 62
End: 2022-02-11

## 2022-02-17 ENCOUNTER — NON-APPOINTMENT (OUTPATIENT)
Age: 62
End: 2022-02-17

## 2022-02-17 ENCOUNTER — APPOINTMENT (OUTPATIENT)
Dept: OPHTHALMOLOGY | Facility: CLINIC | Age: 62
End: 2022-02-17
Payer: MEDICAID

## 2022-02-17 PROCEDURE — 92012 INTRM OPH EXAM EST PATIENT: CPT

## 2022-02-17 PROCEDURE — 92083 EXTENDED VISUAL FIELD XM: CPT

## 2022-03-04 ENCOUNTER — APPOINTMENT (OUTPATIENT)
Dept: CARDIOLOGY | Facility: CLINIC | Age: 62
End: 2022-03-04
Payer: MEDICAID

## 2022-03-04 VITALS
TEMPERATURE: 98.4 F | RESPIRATION RATE: 16 BRPM | SYSTOLIC BLOOD PRESSURE: 120 MMHG | HEIGHT: 70 IN | WEIGHT: 237 LBS | HEART RATE: 96 BPM | BODY MASS INDEX: 33.93 KG/M2 | DIASTOLIC BLOOD PRESSURE: 78 MMHG | OXYGEN SATURATION: 96 %

## 2022-03-04 PROCEDURE — 99213 OFFICE O/P EST LOW 20 MIN: CPT | Mod: 25

## 2022-03-04 PROCEDURE — 93015 CV STRESS TEST SUPVJ I&R: CPT

## 2022-03-04 PROCEDURE — 93306 TTE W/DOPPLER COMPLETE: CPT

## 2022-03-04 PROCEDURE — 99406 BEHAV CHNG SMOKING 3-10 MIN: CPT

## 2022-03-04 NOTE — REASON FOR VISIT
[CV Risk Factors and Non-Cardiac Disease] : CV risk factors and non-cardiac disease [Hyperlipidemia] : hyperlipidemia [FreeTextEntry3] : Dr. Meier [FreeTextEntry1] : 61 year old male with past medical history of insulin dependent diabetes and hyperlipidemia comes in for cardiac/lipid evaluation. Patient states he is doing well and has no cardiac complaints at this time. Patient states he is still smoking about a pack a day. \par Patient denies any chest pain, shortness of breath, dizziness, palpitations, or any recent episodes of syncope.

## 2022-03-04 NOTE — COUNSELING
[Yes] : Risk of tobacco use and health benefits of smoking cessation discussed: Yes [Cessation strategies including cessation program discussed] : Cessation strategies including cessation program discussed [Encouraged to pick a quit date and identify support needed to quit] : Encouraged to pick a quit date and identify support needed to quit [No] : Not willing to quit smoking [Use of nicotine replacement therapies and other medications discussed] : Use of nicotine replacement therapies and other medications discussed [FreeTextEntry1] : 4

## 2022-03-07 RX ORDER — INSULIN GLARGINE 100 [IU]/ML
100 INJECTION, SOLUTION SUBCUTANEOUS
Qty: 4 | Refills: 3 | Status: COMPLETED | COMMUNITY
Start: 2021-02-16 | End: 2022-03-07

## 2022-03-07 RX ORDER — BLOOD SUGAR DIAGNOSTIC
STRIP MISCELLANEOUS
Qty: 100 | Refills: 5 | Status: COMPLETED | COMMUNITY
Start: 2020-10-15 | End: 2022-03-07

## 2022-03-07 RX ORDER — PEN NEEDLE, DIABETIC 32GX 5/32"
70 NEEDLE, DISPOSABLE MISCELLANEOUS
Qty: 1 | Refills: 5 | Status: COMPLETED | COMMUNITY
Start: 2022-02-11 | End: 2022-03-07

## 2022-03-07 RX ORDER — LANCETS 30 GAUGE
EACH MISCELLANEOUS
Qty: 1 | Refills: 5 | Status: COMPLETED | COMMUNITY
Start: 2022-02-11 | End: 2022-03-07

## 2022-03-31 ENCOUNTER — NON-APPOINTMENT (OUTPATIENT)
Age: 62
End: 2022-03-31

## 2022-03-31 ENCOUNTER — APPOINTMENT (OUTPATIENT)
Dept: OPHTHALMOLOGY | Facility: CLINIC | Age: 62
End: 2022-03-31
Payer: MEDICAID

## 2022-03-31 PROCEDURE — 92012 INTRM OPH EXAM EST PATIENT: CPT

## 2022-04-05 ENCOUNTER — RX CHANGE (OUTPATIENT)
Age: 62
End: 2022-04-05

## 2022-04-14 ENCOUNTER — RX RENEWAL (OUTPATIENT)
Age: 62
End: 2022-04-14

## 2022-04-28 ENCOUNTER — APPOINTMENT (OUTPATIENT)
Dept: OPHTHALMOLOGY | Facility: CLINIC | Age: 62
End: 2022-04-28

## 2022-04-28 ENCOUNTER — APPOINTMENT (OUTPATIENT)
Dept: INTERNAL MEDICINE | Facility: CLINIC | Age: 62
End: 2022-04-28
Payer: MEDICAID

## 2022-04-29 ENCOUNTER — APPOINTMENT (OUTPATIENT)
Dept: INTERNAL MEDICINE | Facility: CLINIC | Age: 62
End: 2022-04-29
Payer: MEDICAID

## 2022-04-29 VITALS
HEIGHT: 70 IN | WEIGHT: 234 LBS | BODY MASS INDEX: 33.5 KG/M2 | TEMPERATURE: 98.1 F | OXYGEN SATURATION: 97 % | HEART RATE: 78 BPM | SYSTOLIC BLOOD PRESSURE: 129 MMHG | DIASTOLIC BLOOD PRESSURE: 86 MMHG

## 2022-04-29 DIAGNOSIS — H40.009 PREGLAUCOMA, UNSPECIFIED, UNSPECIFIED EYE: ICD-10-CM

## 2022-04-29 DIAGNOSIS — Z23 ENCOUNTER FOR IMMUNIZATION: ICD-10-CM

## 2022-04-29 DIAGNOSIS — Z01.818 ENCOUNTER FOR OTHER PREPROCEDURAL EXAMINATION: ICD-10-CM

## 2022-04-29 DIAGNOSIS — Z11.1 ENCOUNTER FOR SCREENING FOR RESPIRATORY TUBERCULOSIS: ICD-10-CM

## 2022-04-29 DIAGNOSIS — Z87.01 PERSONAL HISTORY OF PNEUMONIA (RECURRENT): ICD-10-CM

## 2022-04-29 DIAGNOSIS — H40.003 PREGLAUCOMA, UNSPECIFIED, BILATERAL: ICD-10-CM

## 2022-04-29 DIAGNOSIS — R09.82 POSTNASAL DRIP: ICD-10-CM

## 2022-04-29 DIAGNOSIS — Z12.11 ENCOUNTER FOR SCREENING FOR MALIGNANT NEOPLASM OF COLON: ICD-10-CM

## 2022-04-29 LAB — HBA1C MFR BLD HPLC: 7.5

## 2022-04-29 PROCEDURE — G0009: CPT | Mod: 59

## 2022-04-29 PROCEDURE — 90472 IMMUNIZATION ADMIN EACH ADD: CPT

## 2022-04-29 PROCEDURE — 83036 HEMOGLOBIN GLYCOSYLATED A1C: CPT | Mod: QW

## 2022-04-29 PROCEDURE — 99214 OFFICE O/P EST MOD 30 MIN: CPT | Mod: 25

## 2022-04-29 PROCEDURE — 90715 TDAP VACCINE 7 YRS/> IM: CPT

## 2022-04-29 PROCEDURE — 90677 PCV20 VACCINE IM: CPT

## 2022-04-30 PROBLEM — Z12.11 COLON CANCER SCREENING: Status: RESOLVED | Noted: 2020-04-06 | Resolved: 2022-04-30

## 2022-04-30 PROBLEM — Z87.01 HISTORY OF COMMUNITY ACQUIRED PNEUMONIA: Status: RESOLVED | Noted: 2021-11-29 | Resolved: 2022-04-30

## 2022-04-30 PROBLEM — R09.82 POSTNASAL DRIP: Status: ACTIVE | Noted: 2018-09-06

## 2022-04-30 PROBLEM — Z11.1 TUBERCULOSIS SCREENING: Status: RESOLVED | Noted: 2021-12-07 | Resolved: 2022-04-30

## 2022-04-30 NOTE — HISTORY OF PRESENT ILLNESS
[FreeTextEntry8] : Patient presents for concerns of a persistent cough and wants sildenafil refill. \par \par Patient reports having a cough present for about 3 weeks, he has a lot of nasal congestion and will see quite a bit of phlegm coughed up from time to time.  His throat becomes irritated due to all the coughing.  Denies any shortness of breath, fever or chills.\par \par Otherwise he is doing well for the most part.  He usually sees an Endocrinologist for his diabetes, he has recently started on a new medication that he takes at noon time. He  needs a refill as his Endocrinologist has left the system and he has yet to schedule another visit  with the new one.

## 2022-04-30 NOTE — PHYSICAL EXAM
[No Acute Distress] : no acute distress [Well-Appearing] : well-appearing [Normal Voice/Communication] : normal voice/communication [Normal Sclera/Conjunctiva] : normal sclera/conjunctiva [Normal TMs] : both tympanic membranes were normal [No Lymphadenopathy] : no lymphadenopathy [No Respiratory Distress] : no respiratory distress  [Clear to Auscultation] : lungs were clear to auscultation bilaterally [Normal] : affect was normal and insight and judgment were intact

## 2022-04-30 NOTE — REVIEW OF SYSTEMS
[Redness] : no redness [Itching] : no itching [Earache] : no earache [Postnasal Drip] : postnasal drip [Nasal Discharge] : nasal discharge [Cough] : cough [Negative] : Constitutional [FreeTextEntry4] : see also HPI

## 2022-06-20 ENCOUNTER — RX RENEWAL (OUTPATIENT)
Age: 62
End: 2022-06-20

## 2022-06-27 ENCOUNTER — APPOINTMENT (OUTPATIENT)
Dept: CARDIOLOGY | Facility: CLINIC | Age: 62
End: 2022-06-27

## 2022-07-19 PROBLEM — H40.003 GLAUCOMA SUSPECT OF BOTH EYES: Status: ACTIVE | Noted: 2022-04-29

## 2022-08-01 ENCOUNTER — APPOINTMENT (OUTPATIENT)
Dept: ENDOCRINOLOGY | Facility: CLINIC | Age: 62
End: 2022-08-01

## 2022-08-03 ENCOUNTER — APPOINTMENT (OUTPATIENT)
Dept: ENDOCRINOLOGY | Facility: CLINIC | Age: 62
End: 2022-08-03

## 2022-08-03 VITALS
TEMPERATURE: 98.2 F | SYSTOLIC BLOOD PRESSURE: 123 MMHG | BODY MASS INDEX: 33.39 KG/M2 | HEART RATE: 77 BPM | DIASTOLIC BLOOD PRESSURE: 78 MMHG | HEIGHT: 70 IN | OXYGEN SATURATION: 98 % | WEIGHT: 233.25 LBS

## 2022-08-03 LAB
GLUCOSE BLDC GLUCOMTR-MCNC: 194
HBA1C MFR BLD HPLC: 7.6

## 2022-08-03 PROCEDURE — 95250 CONT GLUC MNTR PHYS/QHP EQP: CPT

## 2022-08-03 PROCEDURE — 83036 HEMOGLOBIN GLYCOSYLATED A1C: CPT | Mod: QW

## 2022-08-03 PROCEDURE — 99204 OFFICE O/P NEW MOD 45 MIN: CPT

## 2022-08-05 LAB
25(OH)D3 SERPL-MCNC: 32.7 NG/ML
ALBUMIN SERPL ELPH-MCNC: 4.9 G/DL
ALP BLD-CCNC: 36 U/L
ALT SERPL-CCNC: 26 U/L
ANION GAP SERPL CALC-SCNC: 12 MMOL/L
AST SERPL-CCNC: 23 U/L
BASOPHILS # BLD AUTO: 0.06 K/UL
BASOPHILS NFR BLD AUTO: 1 %
BILIRUB SERPL-MCNC: 0.3 MG/DL
BUN SERPL-MCNC: 12 MG/DL
CALCIUM SERPL-MCNC: 9.9 MG/DL
CHLORIDE SERPL-SCNC: 104 MMOL/L
CHOLEST SERPL-MCNC: 157 MG/DL
CO2 SERPL-SCNC: 22 MMOL/L
CREAT SERPL-MCNC: 0.96 MG/DL
CREAT SPEC-SCNC: 141 MG/DL
EGFR: 90 ML/MIN/1.73M2
EOSINOPHIL # BLD AUTO: 0.22 K/UL
EOSINOPHIL NFR BLD AUTO: 3.7 %
ESTIMATED AVERAGE GLUCOSE: 174 MG/DL
FRUCTOSAMINE SERPL-MCNC: 292 UMOL/L
GLUCOSE SERPL-MCNC: 191 MG/DL
GLYCOMARK.: 1.6 UG/ML
HBA1C MFR BLD HPLC: 7.7 %
HCT VFR BLD CALC: 48.3 %
HDLC SERPL-MCNC: 43 MG/DL
HGB BLD-MCNC: 14.9 G/DL
IMM GRANULOCYTES NFR BLD AUTO: 0.2 %
LDLC SERPL DIRECT ASSAY-MCNC: 72 MG/DL
LYMPHOCYTES # BLD AUTO: 1.93 K/UL
LYMPHOCYTES NFR BLD AUTO: 32.2 %
MAN DIFF?: NORMAL
MCHC RBC-ENTMCNC: 29.5 PG
MCHC RBC-ENTMCNC: 30.8 GM/DL
MCV RBC AUTO: 95.6 FL
MICROALBUMIN 24H UR DL<=1MG/L-MCNC: 1.9 MG/DL
MICROALBUMIN/CREAT 24H UR-RTO: 14 MG/G
MONOCYTES # BLD AUTO: 0.39 K/UL
MONOCYTES NFR BLD AUTO: 6.5 %
NEUTROPHILS # BLD AUTO: 3.38 K/UL
NEUTROPHILS NFR BLD AUTO: 56.4 %
PLATELET # BLD AUTO: 232 K/UL
POTASSIUM SERPL-SCNC: 4.3 MMOL/L
PROT SERPL-MCNC: 7.1 G/DL
RBC # BLD: 5.05 M/UL
RBC # FLD: 13.2 %
SODIUM SERPL-SCNC: 138 MMOL/L
T3FREE SERPL-MCNC: 2.96 PG/ML
T4 FREE SERPL-MCNC: 1.2 NG/DL
TRIGL SERPL-MCNC: 290 MG/DL
TSH SERPL-ACNC: 1.05 UIU/ML
VIT B12 SERPL-MCNC: 214 PG/ML
WBC # FLD AUTO: 5.99 K/UL

## 2022-08-15 ENCOUNTER — NON-APPOINTMENT (OUTPATIENT)
Age: 62
End: 2022-08-15

## 2022-08-16 ENCOUNTER — APPOINTMENT (OUTPATIENT)
Dept: CARDIOLOGY | Facility: CLINIC | Age: 62
End: 2022-08-16

## 2022-09-11 NOTE — HISTORY OF PRESENT ILLNESS
[FreeTextEntry1] : Mr. KEVIN GOMEZ is a 61 year old male who presents for initial endocrine evaluation with regard to a hx of type 2 dm. The diabetes has been present for about  8  years. \par He denies any history of retinopathy or nephropathy. Patient denies neuropathy. Generally feels well.\par For the diabetes, he is currently taking  Basaglar  25 units QHS along with Metformin 1000 mg bid and Steglatro 15 mg. He too was  started on Repaglinide 2 mg pre lunch.  He denies polyuria, polydipsia, or any visual changes. He  too denies any skin lesions, skin breakdown or non-healing areas of skin. He  too denies any podiatric concerns. Ophthalmologic evaluation is up to date - -follows with Dr. Patricia Daniels. No complications/retinopathy found. \par Did wear Namr[employer] Pro CGM. \par PCP Dr. Samantha Carrasco. \par Pt does not have active lifestyle. \par Pt works as a . \par POCT glucose returned today at 194 mg/dL\par POCT A1c returned today at 7.6%, previously 7.5% in April 2022. \par  Patient checks BG's at home manually once a day at bedtime and sometimes in the morning. Morning iliana around 100 and 200's at bedtime (3-4 hours post-prandial).  Home glucose monitoring has shown values to be running   100-200's.  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 Also hx of ED. Hx of Pancreatitis, Hypogonadism, Hypertriglyceridemia vitamin d deficiency. Patient also with pancreatitis less than 5 years ago per patient, due to hypertriglyceridemia. \par \par Meds: Losartan, sildenafil, multivitamin, fenofibrate, atorvastatin, omega-3\par \par Family hx of Diabetes (mother and sister), glaucoma (father)\par \par Follows with cardiologist Dr. Cummins. Had stress tests and echo in the past.\par \par \par

## 2022-09-20 ENCOUNTER — APPOINTMENT (OUTPATIENT)
Dept: CARDIOLOGY | Facility: CLINIC | Age: 62
End: 2022-09-20

## 2022-10-02 ENCOUNTER — NON-APPOINTMENT (OUTPATIENT)
Age: 62
End: 2022-10-02

## 2022-10-07 ENCOUNTER — APPOINTMENT (OUTPATIENT)
Dept: CARDIOLOGY | Facility: CLINIC | Age: 62
End: 2022-10-07

## 2022-10-07 ENCOUNTER — NON-APPOINTMENT (OUTPATIENT)
Age: 62
End: 2022-10-07

## 2022-10-07 VITALS
SYSTOLIC BLOOD PRESSURE: 120 MMHG | HEIGHT: 70 IN | WEIGHT: 233 LBS | HEART RATE: 87 BPM | RESPIRATION RATE: 16 BRPM | TEMPERATURE: 98.1 F | BODY MASS INDEX: 33.36 KG/M2 | DIASTOLIC BLOOD PRESSURE: 84 MMHG | OXYGEN SATURATION: 95 %

## 2022-10-07 PROCEDURE — 99214 OFFICE O/P EST MOD 30 MIN: CPT | Mod: 25

## 2022-10-07 PROCEDURE — 93000 ELECTROCARDIOGRAM COMPLETE: CPT

## 2022-10-07 RX ORDER — SILDENAFIL 100 MG/1
100 TABLET, FILM COATED ORAL
Qty: 6 | Refills: 11 | Status: ACTIVE | COMMUNITY
Start: 2020-01-29 | End: 1900-01-01

## 2022-10-07 NOTE — DISCUSSION/SUMMARY
[FreeTextEntry1] : Dr. Cummins-(PRIOR VISIT and PMH WITH Dr. Cummins): \par This is a 61-year-old male with past medical history significant for insulin-dependent diabetes mellitus, hyperlipidemia, hypertension, who comes in for cardiac follow-up evaluation he denies chest pain, dizziness, palpitations or syncope.  He does complain of dyspnea on exertion which can occur with walking longer distance on flat ground or walking uphill. \par \par He was born in [place] and has no history of rheumatic fever.  He does not drink excessive caffeine or alcohol. His cardiac risk factors include  hyperlipidemia, insulin-dependent diabetes mellitus, and smoking 1 pack per day for 40 years.\par \par Exercise stress test done March 4, 2022 demonstrated no evidence of myocardial ischemia.\par \par Blood work done February 1, 2022 demonstrated a total cholesterol 116, HDL of 40, triglycerides 199, LDL direct 49, non-HDL cholesterol 76, and calculated LDL of 36 mg/dL.\par The patient will continue on his current dose of atorvastatin 40 mg daily.  He will follow-up with his endocrinologist.\par \par He is currently hemodynamically stable and asymptomatic from a cardiac standpoint.\par \par Patient is seeing a dietician for his diabetes mellitus. Patient works as a . Currently taking 12.5mg of Losartan daily. \par \par Electrocardiogram done January 31, 2022 demonstrate normal sinus rhythm rate of 80 bpm is otherwise unremarkable.\par \par Blood work done October 12, 2021 demonstrated hemoglobin A1c of 7.9, cholesterol 116 mg/dL, triglycerides 222 mg/dL, HDL of 39, LDL calculated 33 mg/dL, and non-HDL cholesterol 77 mg/dL.\par The patient will continue his current dose of Lipitor 40 mg/day.  His LDL target is less than 70 mg/dL.  Smoking cessation was discussed.\par \par The patient's blood pressure is slightly elevated today and I have asked him to change his losartan to 1 full tablet per day of 25 mg.  He is on aspirin 81 mg/day for primary prevention.\par Further recommendations were made after the above-noted diagnostic tests are completed.\par \par Family history- mom diabetes\par CRF- hld, dm, smoker\par

## 2022-10-07 NOTE — REASON FOR VISIT
[CV Risk Factors and Non-Cardiac Disease] : CV risk factors and non-cardiac disease [Hyperlipidemia] : hyperlipidemia [FreeTextEntry3] : Dr. Meier [FreeTextEntry1] : This is a 62 year year old male here today for follow up cardiac evaluation. \par He has a past medical history significant for  insulin-dependent diabetes mellitus, hyperlipidemia, hypertension.\par \par CHIEF COMPLAINT:\par Today he is feeling generally well and does not have any complaints at this time. He is current prescribed ASA 81mg PO DAILY, Atorvastatin 40mg PO DAILY, Fenofibrate 145mg PO DAILY, and Losartan 25mg PO DAILY. \par \par He denies fever, chills, weight loss, malaise, rash, alteration bowel habits, weakness, abdominal  pain, bloating, changes in urination, visual disturbances, chest pain, headaches, dizziness, heart palpitations, recent episodes of syncope or falls at this time.\par

## 2022-10-07 NOTE — ASSESSMENT
[FreeTextEntry1] : This is a 62 year year old male here today for follow up cardiac evaluation. \par He has a past medical history significant for  insulin-dependent diabetes mellitus, hyperlipidemia, hypertension.\par \par CHIEF COMPLAINT:\par Today he is feeling generally well and does not have any complaints at this time. He is current prescribed ASA 81mg PO DAILY, Atorvastatin 40mg PO DAILY, Fenofibrate 145mg PO DAILY, and Losartan 25mg PO DAILY. \par \par He was born in [place] and has no history of rheumatic fever.  He does not drink excessive caffeine or alcohol. His cardiac risk factors include  hyperlipidemia, insulin-dependent diabetes mellitus, and smoking 1 pack per day for 40 years.\par \par BLOOD PRESSURE:\par -BP is well controlled in today's visit.\par \par -I have discussed the importance of maintaining good BP control and reviewed the newest guidelines with the patient while re-enforcing dietary sodium restrictions to no more than 2-3 g daily, DASH diet, life style modifications as well as the goal of maintaining ideal body weight with the patient today. I have advised the patient to avoid the use of over-the-counter medications/ supplements especially NSAIDS.\par \par I have reviewed with Mr. GOMEZ that serious health consequences can occur when blood pressure is not well controlled and the need for strict compliance with medication and that optimal control can significantly reduce the risk of cardiovascular disease stroke, heart attack and other organ damage. They verbally expressed understanding of the fore mentioned serious health consequences to me today.\par \par BLOOD WORK:\par -New blood work was done on August 3, 2022 which demonstrated normal lipid profile.  This was done by his endocrinologist.\par \par CHOLESTEROL CONTROL:\par -Patient will continue the advised  TLC diet and to continue follow-up for treatment of hyperlipidemia and repeat blood testing with diet and exercise. I have discussed different exercises and the importance of maintenance of optimal body weight. The importance of staying within guidelines and recommendations was stressed to the patient today and they acknowledged that they understand this to me verbally.\par \par  -Mr. GOMEZ was educated and advised that failure to follow-up with my medical recommendations to lower cholesterol can result in severe health consequences therefore, they will continuing a low saturated and low fat diet and to avoid excessive carbohydrates to help reduce triglycerides and that lowering LDL levels is associated with a significant decrease in serious cardiac events including but not limited to heart attack stroke and overall death. We will continue lipid lowering agents as advised based on blood test results and the patient understands to call if they develop severe muscle discomfort or if they have a reddish tinted discoloration in their urine.\par \par TESTING/REPORTS:\par -EKG done Oct 07, 2022 which demonstrated regular sinus rhythm with nonspecific ST-T wave changes, BPM of 87.\par \par -Exercise stress test done March 4, 2022 demonstrated no evidence of myocardial ischemia.\par \par -Echocardiogram done March 4, 2022 demonstrated ejection fraction of 65% with minimal mitral and tricuspid regurgitation and normal left ventricular systolic function.\par \par Electrocardiogram done January 31, 2022 demonstrate normal sinus rhythm rate of 80 bpm is otherwise unremarkable.\par \par PLAN:\par -He will continue with his current medications and will contact the office if he is having any complaints between now and their next follow up appointment.\par \par I have discussed the plan of care with Mr. KEVIN GOMEZ  and he  will follow up in 3 months. He is compliant with all of his medications.\par \par The patient understands that aerobic exercises must be increased to minutes 4 times/week and a detailed discussion of lifestyle modification was done today. \par The patient has a good understanding of the diagnosis, treatment plan and lifestyle modification. \par He will contact me at the office for any questions with their care or any changes in their health status.\par \par \par Brain HIDALGO

## 2022-10-07 NOTE — PHYSICAL EXAM
[Well Developed] : well developed [Well Nourished] : well nourished [No Acute Distress] : no acute distress [Normal Conjunctiva] : normal conjunctiva [Normal Venous Pressure] : normal venous pressure [No Carotid Bruit] : no carotid bruit [Normal S1, S2] : normal S1, S2 [No Rub] : no rub [No Gallop] : no gallop [5th Left ICS - MCL] : palpated at the 5th LICS in the midclavicular line [Normal Rate] : normal [Rhythm Regular] : regular [Normal S1] : normal S1 [Normal S2] : normal S2 [I] : a grade 1 [No Pitting Edema] : no pitting edema present [2+] : left 2+ [Clear Lung Fields] : clear lung fields [Good Air Entry] : good air entry [No Respiratory Distress] : no respiratory distress  [Soft] : abdomen soft [Non Tender] : non-tender [No Masses/organomegaly] : no masses/organomegaly [Normal Bowel Sounds] : normal bowel sounds [Normal Gait] : normal gait [No Edema] : no edema [No Cyanosis] : no cyanosis [No Clubbing] : no clubbing [No Varicosities] : no varicosities [No Rash] : no rash [Moves all extremities] : moves all extremities [No Skin Lesions] : no skin lesions [No Focal Deficits] : no focal deficits [Normal Speech] : normal speech [Alert and Oriented] : alert and oriented [Normal memory] : normal memory

## 2022-10-11 RX ORDER — FLUTICASONE PROPIONATE 50 UG/1
50 SPRAY, METERED NASAL DAILY
Qty: 1 | Refills: 1 | Status: COMPLETED | COMMUNITY
Start: 2022-04-29 | End: 2022-10-11

## 2022-10-11 RX ORDER — LATANOPROST/PF 0.005 %
0.01 DROPS OPHTHALMIC (EYE)
Refills: 0 | Status: COMPLETED | COMMUNITY
End: 2022-10-11

## 2022-11-16 ENCOUNTER — NON-APPOINTMENT (OUTPATIENT)
Age: 62
End: 2022-11-16

## 2022-11-17 ENCOUNTER — APPOINTMENT (OUTPATIENT)
Dept: INTERNAL MEDICINE | Facility: CLINIC | Age: 62
End: 2022-11-17

## 2022-11-17 VITALS
OXYGEN SATURATION: 95 % | HEART RATE: 81 BPM | WEIGHT: 236 LBS | BODY MASS INDEX: 33.79 KG/M2 | SYSTOLIC BLOOD PRESSURE: 121 MMHG | DIASTOLIC BLOOD PRESSURE: 80 MMHG | HEIGHT: 70 IN | TEMPERATURE: 98.3 F

## 2022-11-17 DIAGNOSIS — F41.8 OTHER SPECIFIED ANXIETY DISORDERS: ICD-10-CM

## 2022-11-17 DIAGNOSIS — M54.50 LOW BACK PAIN, UNSPECIFIED: ICD-10-CM

## 2022-11-17 DIAGNOSIS — R10.32 LEFT LOWER QUADRANT PAIN: ICD-10-CM

## 2022-11-17 DIAGNOSIS — L98.9 DISORDER OF THE SKIN AND SUBCUTANEOUS TISSUE, UNSPECIFIED: ICD-10-CM

## 2022-11-17 PROCEDURE — 99396 PREV VISIT EST AGE 40-64: CPT

## 2022-11-17 NOTE — HEALTH RISK ASSESSMENT
[Good] : ~his/her~  mood as  good [Current] : Current [Yes] : Yes [2 - 3 times a week (3 pts)] : 2 - 3  times a week (3 points) [1 or 2 (0 pts)] : 1 or 2 (0 points) [Never (0 pts)] : Never (0 points) [No] : In the past 12 months have you used drugs other than those required for medical reasons? No [0] : 2) Feeling down, depressed, or hopeless: Not at all (0) [PHQ-2 Negative - No further assessment needed] : PHQ-2 Negative - No further assessment needed [With Family] : lives with family [Employed] : employed [Smoke Detector] : smoke detector [Carbon Monoxide Detector] : carbon monoxide detector [de-identified] : 1ppd  [[id number]] : 0 [Reports changes in hearing] : Reports no changes in hearing [Reports changes in vision] : Reports no changes in vision [Reports changes in dental health] : Reports no changes in dental health [Guns at Home] : no guns at home

## 2022-11-17 NOTE — PHYSICAL EXAM
[No Carotid Bruits] : no carotid bruits [Pedal Pulses Present] : the pedal pulses are present [No Edema] : there was no peripheral edema [Coordination Grossly Intact] : coordination grossly intact [No Focal Deficits] : no focal deficits [Normal Gait] : normal gait [Normal] : affect was normal and insight and judgment were intact [Comprehensive Foot Exam Normal] : Right and left foot were examined and both feet are normal. No ulcers in either foot. Toes are normal and with full ROM.  Normal tactile sensation with monofilament testing throughout both feet

## 2022-11-17 NOTE — HISTORY OF PRESENT ILLNESS
[de-identified] : 62 y.o. male, PMHx diabetes, pancreatitis 2/2 hypertriglyceridemia, anxiety, depression, obesity, PINKY (uses CPAP), incidental finding of celiac artery disection on abdominal imaging. \par Presents today for CPE/wellness visit.  \par Feeling overall well.  No concerns offered.  Needs new CPAP device.  \par

## 2022-11-17 NOTE — COUNSELING
[Yes] : Risk of tobacco use and health benefits of smoking cessation discussed: Yes [No] : Not willing to quit smoking [Potential consequences of obesity discussed] : Potential consequences of obesity discussed [Benefits of weight loss discussed] : Benefits of weight loss discussed [Encouraged to increase physical activity] : Encouraged to increase physical activity [Needs reinforcement, provided] : Patient needs reinforcement on understanding of disease, goals and obesity follow-up plan; reinforcement was provided

## 2022-11-17 NOTE — ASSESSMENT
[FreeTextEntry1] : 62 y.o. male, PMHx diabetes, pancreatitis 2/2 hypertriglyceridemia, anxiety, depression, obesity, PINKY (uses CPAP), incidental finding of celiac artery disection on abdominal imaging, current smoker. \par Presents today for CPE/wellness visit.  \par \par Diabetes:\par Improved, but not at goal - A1C 7.7\par Taking meds as prescribed \par Continues f/u with endo, repeat a1C today\par Taking statin\par Normal monofilament testing\par Dietary modification and regular exercise encouraged\par Needs to schedule with ophthalmology\par Pneumococcal vaccination 2022\par Taking ARB for albuminuria\par \par H/o anxiety, depression:\par Not an issue currently.  Mainly work related stress that he his managing\par \par Obesity:\par Dietary modification and regular exercise encouraged for weight loss.  \par \par PINKY:\par Uses CPAP\par Order provided for new machine.  Recommend sleep medicine f/u as well. \par \par ?celiac artery disection:\par Needs to schedule f/u imaging and vascular f/u \par \par Hyperlipidemia, hypertriglyceridemia:\par Improved - continues statin, fibrate and Omega 3\par Smoking cessation, regular exercise, weight loss encouraged. \par \par Current smoking:\par not interested in smoking cessation assistance at this time.  \par \par HM:\par CRS - colonoscopy 6/2020, repeat 10 yr.  \par Skin cancer screening - advised yearly with dermatology, referral provided\par Dental - regular check ups \par tdap UTD 4/2022\par Shingrix vaccine - declines \par Flu shot - declines \par COVID vaccine - primary series + booster x 1 (COVID illness illness a few weeks ago, will wait for next booster)\par check labs\par

## 2022-11-23 LAB
25(OH)D3 SERPL-MCNC: 48.4 NG/ML
ALBUMIN SERPL ELPH-MCNC: 5.1 G/DL
ALP BLD-CCNC: 33 U/L
ALT SERPL-CCNC: 53 U/L
ANION GAP SERPL CALC-SCNC: 15 MMOL/L
AST SERPL-CCNC: 35 U/L
BASOPHILS # BLD AUTO: 0.08 K/UL
BASOPHILS NFR BLD AUTO: 1 %
BILIRUB SERPL-MCNC: 0.6 MG/DL
BUN SERPL-MCNC: 15 MG/DL
CALCIUM SERPL-MCNC: 9.9 MG/DL
CHLORIDE SERPL-SCNC: 105 MMOL/L
CHOLEST SERPL-MCNC: 113 MG/DL
CO2 SERPL-SCNC: 20 MMOL/L
CREAT SERPL-MCNC: 0.95 MG/DL
EGFR: 91 ML/MIN/1.73M2
EOSINOPHIL # BLD AUTO: 0.22 K/UL
EOSINOPHIL NFR BLD AUTO: 2.8 %
ESTIMATED AVERAGE GLUCOSE: 177 MG/DL
GLUCOSE SERPL-MCNC: 123 MG/DL
HBA1C MFR BLD HPLC: 7.8 %
HCT VFR BLD CALC: 48.4 %
HDLC SERPL-MCNC: 39 MG/DL
HGB BLD-MCNC: 15.8 G/DL
IMM GRANULOCYTES NFR BLD AUTO: 0.5 %
LDLC SERPL CALC-MCNC: 39 MG/DL
LYMPHOCYTES # BLD AUTO: 2.61 K/UL
LYMPHOCYTES NFR BLD AUTO: 32.6 %
MAN DIFF?: NORMAL
MCHC RBC-ENTMCNC: 29.6 PG
MCHC RBC-ENTMCNC: 32.6 GM/DL
MCV RBC AUTO: 90.6 FL
MONOCYTES # BLD AUTO: 0.52 K/UL
MONOCYTES NFR BLD AUTO: 6.5 %
NEUTROPHILS # BLD AUTO: 4.53 K/UL
NEUTROPHILS NFR BLD AUTO: 56.6 %
NONHDLC SERPL-MCNC: 74 MG/DL
PLATELET # BLD AUTO: 274 K/UL
POTASSIUM SERPL-SCNC: 4.2 MMOL/L
PROT SERPL-MCNC: 8.3 G/DL
PSA SERPL-MCNC: 2.32 NG/ML
RBC # BLD: 5.34 M/UL
RBC # FLD: 12.5 %
SODIUM SERPL-SCNC: 140 MMOL/L
TRIGL SERPL-MCNC: 175 MG/DL
VIT B12 SERPL-MCNC: 310 PG/ML
WBC # FLD AUTO: 8 K/UL

## 2022-11-28 ENCOUNTER — APPOINTMENT (OUTPATIENT)
Dept: ENDOCRINOLOGY | Facility: CLINIC | Age: 62
End: 2022-11-28

## 2022-11-28 ENCOUNTER — APPOINTMENT (OUTPATIENT)
Dept: VASCULAR SURGERY | Facility: CLINIC | Age: 62
End: 2022-11-28

## 2022-11-28 VITALS
DIASTOLIC BLOOD PRESSURE: 75 MMHG | BODY MASS INDEX: 33.93 KG/M2 | TEMPERATURE: 99.5 F | WEIGHT: 237 LBS | HEART RATE: 75 BPM | SYSTOLIC BLOOD PRESSURE: 111 MMHG | HEIGHT: 70 IN

## 2022-11-28 VITALS
BODY MASS INDEX: 33.93 KG/M2 | WEIGHT: 237 LBS | SYSTOLIC BLOOD PRESSURE: 122 MMHG | DIASTOLIC BLOOD PRESSURE: 78 MMHG | HEIGHT: 70 IN | TEMPERATURE: 98.4 F | OXYGEN SATURATION: 95 % | HEART RATE: 87 BPM

## 2022-11-28 LAB — GLUCOSE BLDC GLUCOMTR-MCNC: 184

## 2022-11-28 PROCEDURE — 82962 GLUCOSE BLOOD TEST: CPT

## 2022-11-28 PROCEDURE — 99213 OFFICE O/P EST LOW 20 MIN: CPT

## 2022-11-28 PROCEDURE — 99214 OFFICE O/P EST MOD 30 MIN: CPT

## 2022-11-28 NOTE — HISTORY OF PRESENT ILLNESS
[FreeTextEntry1] : Mr. KEVIN GOMEZ is a 62 year old male who returns with regard to a hx of type 2 dm. The diabetes has been present for about 8 years. \par He denies any history of retinopathy or nephropathy. Patient denies neuropathy. Generally feels well.\par \par For the diabetes, he is currently taking Basaglar 25 units QHS along with Metformin 1000 mg bid and Steglatro 15 mg. He too was started on Repaglinide 2 mg 1 tablet pre lunch.\par \par  He denies polyuria, polydipsia, or any visual changes. He too denies any skin lesions, skin breakdown or non-healing areas of skin. He too denies any podiatric concerns. Ophthalmologic evaluation is up to date - -follows with Dr. Patricia Daniels  and has f/u in January . No complications/retinopathy found. \par \par \par a1c returned at 7.8% on 11/17/22   ; previously   7.7 % on 8/3/22 \par POCT glucose returned today at  184  mg/dl\par \par  HGM values shown to be running -140 and before bed 180-190, can go up to 250 some values might be right after dinner and some before bed after 2 hours after dinner . He does deny any hypoglycemia or hypoglycemic signs or symptoms.\par \par he does report that diet has not been optimal and has been on two vacations. 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 Also hx of ED. Hx of Pancreatitis, Hypogonadism, Hypertriglyceridemia vitamin d deficiency. The  pancreatitis occurred less than 5 years ago and was felt to be due to hypertriglyceridemia. \par \par Meds: Losartan, sildenafil, ASA, multivitamin, fenofibrate, atorvastatin, omega-3, vitamin D 2000, sublingual b12 \par \par Family hx of Diabetes (mother and sister), glaucoma (father)\par \par Follows with cardiologist Dr. Cummins. Had stress tests and echo in the past.\par PCP Dr. Samantha Carrasco.

## 2022-11-29 ENCOUNTER — RX RENEWAL (OUTPATIENT)
Age: 62
End: 2022-11-29

## 2022-11-29 NOTE — PHYSICAL EXAM
[Normal Breath Sounds] : Normal breath sounds [Normal Heart Sounds] : normal heart sounds [de-identified] : NAD [de-identified] : WNL [de-identified] : The abdomen is obese. Non-tender. Non-distended.

## 2022-11-29 NOTE — HISTORY OF PRESENT ILLNESS
[FreeTextEntry1] : Mr. [place] is a 62-year old gentleman who presents in follow up for celiac artery dissection. He was hospitalized at [facility] at the end of September 2020 for pancreatitis (likely related to elevated triglycerides) and underwent a CTA (9/24/2020) which demonstrated celiac dissection extending into the proximal splenic artery. The SMA was widely patent.\par \par Following discharge from the hospital, Mr. Mitchell did well. All prior abdominal pain resolved. He was tolerating a regular diet and moving his bowels. He is following up with GI and IM. Unfortunately, he continues to smoke cigarettes (1/2 pack/day). He is taking Aspirin 81mg daily, as instructed.\par \par Last year, he underwent repeat CTA, which showed stable findings. He presents today for routine follow up. He denies any abdominal, flank or back pain. He is tolerating his diet and moving his bowels. He denies any new episodes of pancreatitis. He is without complaints. \par \par PMH: HLD, obesity, PINKY, DMII\par \par Medications: Aspirin, Lipitor, Vitamins, Lantus, Metformin\par \par All: PCN\par \par SH: as above\par \par FH: NC

## 2022-11-29 NOTE — ASSESSMENT
[FreeTextEntry1] : In summary, Mr. [place] presents with a history of asymptomatic celiac axis dissection. I will send him for a repeat CTA of the abdomen. He will follow up via Telehealth once imaging studies are performed.

## 2022-12-20 ENCOUNTER — APPOINTMENT (OUTPATIENT)
Dept: INTERNAL MEDICINE | Facility: CLINIC | Age: 62
End: 2022-12-20

## 2022-12-29 ENCOUNTER — RX CHANGE (OUTPATIENT)
Age: 62
End: 2022-12-29

## 2022-12-30 ENCOUNTER — APPOINTMENT (OUTPATIENT)
Dept: INTERNAL MEDICINE | Facility: CLINIC | Age: 62
End: 2022-12-30
Payer: MEDICAID

## 2022-12-30 DIAGNOSIS — B37.49 OTHER UROGENITAL CANDIDIASIS: ICD-10-CM

## 2022-12-30 PROCEDURE — 99213 OFFICE O/P EST LOW 20 MIN: CPT | Mod: 95

## 2022-12-30 NOTE — HISTORY OF PRESENT ILLNESS
[Home] : at home, [unfilled] , at the time of the visit. [Medical Office: ([facility])___] : at the medical office located in  [FreeTextEntry8] : 62 y.o. male, PMHx diabetes, pancreatitis 2/2 hypertriglyceridemia, anxiety, depression, obesity, PINKY (uses CPAP), incidental finding of celiac artery disection on abdominal imaging. \par Has been having swelling and discharge on the penis for about 1 week.  Has been using nystatin/triamcinolone cream with only temporary improvement.  \par No pain, but does have an itch.  \par BG has been OK.  \par

## 2022-12-30 NOTE — ASSESSMENT
[FreeTextEntry1] : Balanitis:\par Rx sent in for fluconazole.  Continue cream for addition al 3-5 days if needed for swelling/itch\par Stressed importance of BG control\par \par Med renewal provided for insulin (insurance not covering basaglar but will cover Glargine). \par

## 2023-01-10 ENCOUNTER — APPOINTMENT (OUTPATIENT)
Dept: PULMONOLOGY | Facility: CLINIC | Age: 63
End: 2023-01-10
Payer: MEDICAID

## 2023-01-10 VITALS
HEART RATE: 88 BPM | WEIGHT: 243 LBS | DIASTOLIC BLOOD PRESSURE: 85 MMHG | TEMPERATURE: 98.3 F | OXYGEN SATURATION: 94 % | HEIGHT: 70 IN | RESPIRATION RATE: 17 BRPM | BODY MASS INDEX: 34.79 KG/M2 | SYSTOLIC BLOOD PRESSURE: 132 MMHG

## 2023-01-10 PROCEDURE — 99406 BEHAV CHNG SMOKING 3-10 MIN: CPT

## 2023-01-10 PROCEDURE — 99204 OFFICE O/P NEW MOD 45 MIN: CPT | Mod: 25

## 2023-01-10 RX ORDER — FLUCONAZOLE 150 MG/1
150 TABLET ORAL
Qty: 2 | Refills: 0 | Status: DISCONTINUED | COMMUNITY
Start: 2021-04-14 | End: 2023-01-10

## 2023-01-11 NOTE — END OF VISIT
[FreeTextEntry3] : I, Dr. Kristina Alexandra personally performed the evaluation and management (E/M) services for this new patient.  That E/M includes conducting the initial examination, assessing all conditions, and establishing the plan of care.  Today, Elizabeth Jacinto ACP was here to observe my evaluation and management services for this patient to be followed going forward.\par \par Pt with hx of severe PINKY, has 8 year old APAP device, needs new one, as it is leaking air. Reports stable benefit without changes in sleep or weight. Patient was advised to followup in office visit for compliance and therapy data within one to 2 months of initiating pap therapy. Pt declined smoking cession, continues to smoke 1PPD, but is eligible for lung cancer screening. Patient education materials re: lung cancer screening provided to patient as well today. \par 45 minutes time spent for patient education related to comorbidities and medications, medical records/labs/radiology reviews, preventative care, documentation, coordination of care.\par \par .

## 2023-01-11 NOTE — HISTORY OF PRESENT ILLNESS
[To Bed: ___] : ~he/she~ goes to bed at [unfilled] [Arises: ___] : arises at [unfilled] [Obstructive Sleep Apnea] : obstructive sleep apnea [FreeTextEntry1] : 62 year old male here for re-establishing care for PINKY on PAP therapy hasn’t been seen since 2015. Pt was diagnosed Severe PINKY AHI 68 on sleep study in 2016 on APAP 5-15. Pmhx of obesity, HLD, DM, Smoker PPD no interest in quitting.  \par \par Wakes up refreshed uses his machine nightly with reported benefit. Machine is old and not functioning as well. Takes an intentional nap daily 1 hour in the afternoon uses his PAP when he takes the nap. Few nighttime awakenings due to nocturia and goes back to bed without issues. No drowsy driving, not falling asleep while active. ESS 12 was 17 before PAP therapy \par \par Nasal Mask. \par DME [facility] (previously used continued care did not like them) \par \par Intermittent Cough, currently smoking without desire to quit. approx 30 pys, Sister hx of cancer with bone marrow transplant, no known famhx of lung cancer. Denies sob, lane.   [ESS] : 12

## 2023-01-11 NOTE — PHYSICAL EXAM
[Normal Appearance] : normal appearance [General Appearance - In No Acute Distress] : no acute distress [Normal Conjunctiva] : the conjunctiva exhibited no abnormalities [IV] : IV [Neck Appearance] : the appearance of the neck was normal [Apical Impulse] : the apical impulse was normal [Heart Rate And Rhythm] : heart rate was normal and rhythm regular [Murmurs] : no murmurs [] : no respiratory distress [Respiration, Rhythm And Depth] : normal respiratory rhythm and effort [Exaggerated Use Of Accessory Muscles For Inspiration] : no accessory muscle use [Auscultation Breath Sounds / Voice Sounds] : lungs were clear to auscultation bilaterally [Abnormal Walk] : normal gait [Nail Clubbing] : no clubbing of the fingernails [Cyanosis, Localized] : no localized cyanosis [No Focal Deficits] : no focal deficits [Oriented To Time, Place, And Person] : oriented to person, place, and time [Impaired Insight] : insight and judgment were intact [FreeTextEntry1] : obese adult male

## 2023-01-11 NOTE — ASSESSMENT
[Discussed Risks and Advised to Quit Smoking] : Discussed risks and advised to quit smoking [Discussed Cessation Medication] : cessation medication was discussed [Smoking Cessation Counseling Given (more than 3 min less than10 min) and Resources Provided] : Smoking cessation counseling given (more than 3 min less than 10 min) and resources provided [Declined] : Patient has declined cessation intervention [Pre-contemplation] : Pre-contemplation: The patient is not thinking about quitting smoking in the next 6 months [Patient refused treatment] : Patient refused treatment [de-identified] : Provided patient with resources from [facility] regarding smoking cessation and lung cancer screening chest ct. Patient declined medication to help him quit. He would like to think about the [facility] CT and will let the provider know his decision.  [FreeTextEntry1] : ATTENDING ATTESTATION\par \par 62 year old male here for re-establishing care for PINKY on PAP therapy hasn’t been seen since 2015. 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 pt would like to think about it. Educated pt and provided materials on LCS. \par \par Severe IPNKY \par DME [facility]\par - Pt machine old >5 years and not functioning as well \par - Ordered new APAP 5-15 with Nasal Mask \par - Continue nightly use of APAP\par \par F/u with compliance in 1-2 months after receiving new device \par

## 2023-02-07 ENCOUNTER — APPOINTMENT (OUTPATIENT)
Dept: INTERNAL MEDICINE | Facility: CLINIC | Age: 63
End: 2023-02-07
Payer: MEDICAID

## 2023-02-07 PROCEDURE — 99442: CPT

## 2023-03-07 ENCOUNTER — RX CHANGE (OUTPATIENT)
Age: 63
End: 2023-03-07

## 2023-03-07 RX ORDER — LOSARTAN POTASSIUM 25 MG/1
25 TABLET, FILM COATED ORAL
Qty: 90 | Refills: 3 | Status: DISCONTINUED | COMMUNITY
Start: 2021-06-09 | End: 2023-03-07

## 2023-03-13 ENCOUNTER — APPOINTMENT (OUTPATIENT)
Dept: ENDOCRINOLOGY | Facility: CLINIC | Age: 63
End: 2023-03-13
Payer: MEDICAID

## 2023-03-13 VITALS
BODY MASS INDEX: 33.94 KG/M2 | HEIGHT: 70 IN | TEMPERATURE: 98 F | SYSTOLIC BLOOD PRESSURE: 110 MMHG | HEART RATE: 85 BPM | WEIGHT: 237.06 LBS | DIASTOLIC BLOOD PRESSURE: 80 MMHG | OXYGEN SATURATION: 94 %

## 2023-03-13 LAB
GLUCOSE BLDC GLUCOMTR-MCNC: 115
HBA1C MFR BLD HPLC: 8.1

## 2023-03-13 PROCEDURE — 83036 HEMOGLOBIN GLYCOSYLATED A1C: CPT | Mod: QW

## 2023-03-13 PROCEDURE — 82962 GLUCOSE BLOOD TEST: CPT

## 2023-03-13 PROCEDURE — 99214 OFFICE O/P EST MOD 30 MIN: CPT

## 2023-03-14 LAB
25(OH)D3 SERPL-MCNC: 41.4 NG/ML
ALBUMIN SERPL ELPH-MCNC: 4.9 G/DL
ALP BLD-CCNC: 31 U/L
ALT SERPL-CCNC: 41 U/L
ANION GAP SERPL CALC-SCNC: 17 MMOL/L
AST SERPL-CCNC: 40 U/L
BILIRUB SERPL-MCNC: 0.7 MG/DL
BUN SERPL-MCNC: 16 MG/DL
CALCIUM SERPL-MCNC: 10 MG/DL
CHLORIDE SERPL-SCNC: 105 MMOL/L
CHOLEST SERPL-MCNC: 103 MG/DL
CO2 SERPL-SCNC: 18 MMOL/L
CREAT SERPL-MCNC: 0.94 MG/DL
CREAT SPEC-SCNC: 89 MG/DL
EGFR: 92 ML/MIN/1.73M2
ESTIMATED AVERAGE GLUCOSE: 186 MG/DL
FRUCTOSAMINE SERPL-MCNC: 325 UMOL/L
GLUCOSE SERPL-MCNC: 110 MG/DL
GLYCOMARK.: 0.2 UG/ML
HBA1C MFR BLD HPLC: 8.1 %
HDLC SERPL-MCNC: 38 MG/DL
LDLC SERPL CALC-MCNC: 37 MG/DL
LDLC SERPL DIRECT ASSAY-MCNC: 39 MG/DL
MAGNESIUM SERPL-MCNC: 2.1 MG/DL
MICROALBUMIN 24H UR DL<=1MG/L-MCNC: 3.5 MG/DL
MICROALBUMIN/CREAT 24H UR-RTO: 39 MG/G
NONHDLC SERPL-MCNC: 65 MG/DL
POTASSIUM SERPL-SCNC: 4.2 MMOL/L
PROT SERPL-MCNC: 8.1 G/DL
SODIUM SERPL-SCNC: 140 MMOL/L
T3FREE SERPL-MCNC: 3.38 PG/ML
T4 FREE SERPL-MCNC: 1.5 NG/DL
TRIGL SERPL-MCNC: 139 MG/DL
TSH SERPL-ACNC: 0.93 UIU/ML
VIT B12 SERPL-MCNC: 272 PG/ML

## 2023-03-14 RX ORDER — MAGNESIUM 200 MG
1000 TABLET ORAL
Qty: 45 | Refills: 1 | Status: ACTIVE | COMMUNITY
Start: 2022-08-05 | End: 1900-01-01

## 2023-03-19 NOTE — HISTORY OF PRESENT ILLNESS
[FreeTextEntry1] : Mr. KEVIN GOMEZ is a 62 year old male who returns with regard to a hx of type 2 dm. The diabetes has been present for about 8 years. \par He denies any history of retinopathy or nephropathy. Patient denies neuropathy. Generally feels well.\par \par For the diabetes, he is currently taking Basaglar 27 units QHS along with Metformin 1000 mg bid and Steglatro 15 mg. He too was started on Repaglinide 2 mg 1 tablet pre lunch. Was to take pre dinner too but forgot to but hs bg's in 200's yesterday 183\par \par  He denies polyuria, polydipsia, or any visual changes. He too denies any skin lesions, skin breakdown or non-healing areas of skin. He too denies any podiatric concerns. Ophthalmologic evaluation is up to date - -follows with Dr. Patricia Daniels. due for f/u in april 2023. Does use eye drops for increased IOP. \par \par Has been traveling a lot, diet not optimal. DInner is usually rice, steak, chicken \par \par \par POCT A1C returned today 8.1%    \par POCT glucose returned today at  115  mg/dl\par \par \par  HGM values shown to be running -110,  before bed 140-250  , typically 180s    . He does deny any hypoglycemia or hypoglycemic signs or symptoms.\par \par he does report that diet has not been optimal and has been on two vacations. 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 Also hx of ED. Hx of Pancreatitis, Hypogonadism, Hypertriglyceridemia vitamin d deficiency. The pancreatitis occurred less than 5 years ago and was felt to be due to hypertriglyceridemia. \par \par Meds: Losartan, sildenafil, ASA, multivitamin, fenofibrate, atorvastatin, omega-3, vitamin D 2000, sublingual b12 \par \par Family hx of Diabetes (mother and sister), glaucoma (father)\par \par Follows with cardiologist Dr. Cummins. \par PCP Dr. Samantha Carrasco. \par  \par

## 2023-03-20 ENCOUNTER — RX RENEWAL (OUTPATIENT)
Age: 63
End: 2023-03-20

## 2023-03-28 ENCOUNTER — OUTPATIENT (OUTPATIENT)
Dept: OUTPATIENT SERVICES | Facility: HOSPITAL | Age: 63
LOS: 1 days | End: 2023-03-28
Payer: MEDICAID

## 2023-03-28 ENCOUNTER — APPOINTMENT (OUTPATIENT)
Dept: CT IMAGING | Facility: HOSPITAL | Age: 63
End: 2023-03-28
Payer: MEDICAID

## 2023-03-28 DIAGNOSIS — I77.79 DISSECTION OF OTHER SPECIFIED ARTERY: ICD-10-CM

## 2023-03-28 PROCEDURE — 74175 CTA ABDOMEN W/CONTRAST: CPT | Mod: 26

## 2023-03-28 PROCEDURE — 74175 CTA ABDOMEN W/CONTRAST: CPT

## 2023-04-04 ENCOUNTER — RX CHANGE (OUTPATIENT)
Age: 63
End: 2023-04-04

## 2023-04-04 RX ORDER — FENOFIBRATE 145 MG/1
145 TABLET, COATED ORAL DAILY
Qty: 90 | Refills: 1 | Status: DISCONTINUED | COMMUNITY
Start: 2020-10-15 | End: 2023-04-04

## 2023-04-04 RX ORDER — OMEGA-3-ACID ETHYL ESTERS CAPSULES 1 G/1
1 CAPSULE, LIQUID FILLED ORAL
Qty: 360 | Refills: 1 | Status: DISCONTINUED | COMMUNITY
Start: 2020-10-15 | End: 2023-04-04

## 2023-04-14 ENCOUNTER — APPOINTMENT (OUTPATIENT)
Dept: CARDIOLOGY | Facility: CLINIC | Age: 63
End: 2023-04-14

## 2023-05-08 ENCOUNTER — RX RENEWAL (OUTPATIENT)
Age: 63
End: 2023-05-08

## 2023-05-11 ENCOUNTER — APPOINTMENT (OUTPATIENT)
Dept: VASCULAR SURGERY | Facility: CLINIC | Age: 63
End: 2023-05-11
Payer: MEDICAID

## 2023-05-11 PROCEDURE — 99442: CPT

## 2023-05-11 NOTE — ASSESSMENT
[FreeTextEntry1] : In summary, Mr. Mitchell presents with a history of celiac dissection. CTA findings have been stable for three years. CTA finding were discussed with patient. I encouraged him to quit smoking cigarettes. He will follow up in one year with repeat CTA.

## 2023-05-11 NOTE — HISTORY OF PRESENT ILLNESS
[Home] : at home, [unfilled] , at the time of the visit. [Medical Office: ([facility])___] : at the medical office located in  [Verbal consent obtained from patient] : the patient, [unfilled] [FreeTextEntry1] : Mr. Mitchell is a 62-year old gentleman who presents in follow up for celiac artery dissection. He was hospitalized at [facility] at the end of September 2020 for pancreatitis (likely related to elevated triglycerides) and underwent a CTA (9/24/2020) which demonstrated celiac dissection extending into the proximal splenic artery. The SMA was widely patent.\par \par Following discharge from the hospital, Mr. Mitchell did well. All prior abdominal pain resolved. He was tolerating a regular diet and moving his bowels. He is following up with GI and IM. Unfortunately, he continues to smoke cigarettes (1/2 pack/day). He is taking Aspirin 81mg daily, as instructed.\par \par Last year, he underwent repeat CTA, which showed stable findings. He presents today for routine follow up. He denies any abdominal, flank or back pain. He is tolerating his diet and moving his bowels. He denies any new episodes of pancreatitis. He is without complaints. \par \par PMH: HLD, obesity, PINKY, DMII\par \par Medications: Aspirin, Lipitor, Vitamins, Lantus, Metformin\par \par All: PCN\par \par SH: as above\par \par FH: NC [de-identified] : I had the pleasure of following up with Mr. Mitchell via Telehealth for celiac dissection. He recently underwent surveillance CTA, which showed stable findings. He denies any abdominal, flank or back pain. He is tolerating a diet and moving his bowels. He denies any new episodes of pancreatitis. He continues to smoke 1/2-1 pack of cigarettes dally. He reports that he will undergo screening for lung cancer. He acknowledges that he should quit tobacco use.

## 2023-05-17 ENCOUNTER — RX RENEWAL (OUTPATIENT)
Age: 63
End: 2023-05-17

## 2023-05-17 ENCOUNTER — NON-APPOINTMENT (OUTPATIENT)
Age: 63
End: 2023-05-17

## 2023-05-17 ENCOUNTER — APPOINTMENT (OUTPATIENT)
Dept: CARDIOLOGY | Facility: CLINIC | Age: 63
End: 2023-05-17
Payer: MEDICAID

## 2023-05-17 VITALS
TEMPERATURE: 97.9 F | HEART RATE: 81 BPM | DIASTOLIC BLOOD PRESSURE: 84 MMHG | SYSTOLIC BLOOD PRESSURE: 124 MMHG | BODY MASS INDEX: 33.79 KG/M2 | OXYGEN SATURATION: 97 % | HEIGHT: 70 IN | RESPIRATION RATE: 16 BRPM | WEIGHT: 236 LBS

## 2023-05-17 PROCEDURE — 99214 OFFICE O/P EST MOD 30 MIN: CPT | Mod: 25

## 2023-05-17 PROCEDURE — 93000 ELECTROCARDIOGRAM COMPLETE: CPT

## 2023-05-17 NOTE — ASSESSMENT
[FreeTextEntry1] : This is a 62 year year old male here today for follow up cardiac evaluation. \par He has a past medical history significant for  insulin-dependent diabetes mellitus, hyperlipidemia, hypertension.\par \par CHIEF COMPLAINT:\par Today he is feeling generally well and does not have any complaints at this time.  He follows up closely with his endocrinologist for management of his diabetes.\par \par He is current prescribed ASA 81mg PO DAILY, Atorvastatin 40mg PO DAILY, Fenofibrate 145mg PO DAILY, and Losartan 25mg PO DAILY. \par \par He was born in [place] and has no history of rheumatic fever.  He does not drink excessive caffeine or alcohol. His cardiac risk factors include  hyperlipidemia, insulin-dependent diabetes mellitus, and smoking 1 pack per day for 40 years.\par \par BLOOD PRESSURE:\par -BP is well controlled in today's visit.\par \par BLOOD WORK:\par -The patient had blood work done by his endocrinologist on March 13, 2023 which demonstrated normal lipid profile and a direct LDL of 39.\par -New blood work was done on August 3, 2022 which demonstrated normal lipid profile.  This was done by his endocrinologist.\par \par TESTING/REPORTS:\par -EKG done 05/17/2023 which demonstrated regular sinus rhythm with nonspecific ST-T wave changes BPM of \par 81.\par \par -EKG done Oct 07, 2022 which demonstrated regular sinus rhythm with nonspecific ST-T wave changes, BPM of 87.\par \par -Exercise stress test done March 4, 2022 demonstrated no evidence of myocardial ischemia.\par \par -Echocardiogram done March 4, 2022 demonstrated ejection fraction of 65% with minimal mitral and tricuspid regurgitation and normal left ventricular systolic function.\par \par Electrocardiogram done January 31, 2022 demonstrate normal sinus rhythm rate of 80 bpm is otherwise unremarkable.\par \par PLAN:\par -The patient is clinically stable from a cardiac standpoint on today's exam. \par -He will continue with his current medications and will contact the office if he is having any complaints between now and their next follow up appointment.\par -The patient will schedule an Exercise Stress Test rule out significant coronary artery disease.\par -The patient will schedule an Echo Doppler examination to evaluate murmur, left ventricular function, chamber size, and rule out hypertrophy. \par -He will follow-up with his endocrinologist routinely.\par \par I have discussed the plan of care with MrSilvia GOMEZ  and he  will follow up in 4-6 months. He is compliant with all of his medications.\par \par The patient understands that aerobic exercises must be increased to minutes 4 times/week and a detailed discussion of lifestyle modification was done today. \par The patient has a good understanding of the diagnosis, treatment plan and lifestyle modification. \par He will contact me at the office for any questions with their care or any changes in their health status.\par \par \par Brain HIDALGO

## 2023-05-17 NOTE — COUNSELING
[Cessation strategies including cessation program discussed] : Cessation strategies including cessation program discussed [Use of nicotine replacement therapies and other medications discussed] : Use of nicotine replacement therapies and other medications discussed [Encouraged to pick a quit date and identify support needed to quit] : Encouraged to pick a quit date and identify support needed to quit [No] : Not willing to quit smoking [FreeTextEntry1] : 4

## 2023-05-22 RX ORDER — FLASH GLUCOSE SCANNING READER
EACH MISCELLANEOUS
Qty: 1 | Refills: 0 | Status: COMPLETED | COMMUNITY
Start: 2023-03-13 | End: 2023-05-22

## 2023-05-22 RX ORDER — FLASH GLUCOSE SENSOR
KIT MISCELLANEOUS
Qty: 6 | Refills: 3 | Status: COMPLETED | COMMUNITY
Start: 2023-03-13 | End: 2023-05-22

## 2023-05-22 RX ORDER — LANCETS 28 GAUGE
EACH MISCELLANEOUS
Qty: 3 | Refills: 3 | Status: COMPLETED | COMMUNITY
Start: 2020-10-27 | End: 2023-05-22

## 2023-05-22 RX ORDER — BLOOD SUGAR DIAGNOSTIC
STRIP MISCELLANEOUS
Qty: 100 | Refills: 5 | Status: COMPLETED | COMMUNITY
Start: 2022-04-14 | End: 2023-05-22

## 2023-06-06 ENCOUNTER — NON-APPOINTMENT (OUTPATIENT)
Age: 63
End: 2023-06-06

## 2023-06-07 ENCOUNTER — NON-APPOINTMENT (OUTPATIENT)
Age: 63
End: 2023-06-07

## 2023-06-08 ENCOUNTER — APPOINTMENT (OUTPATIENT)
Dept: ORTHOPEDIC SURGERY | Facility: CLINIC | Age: 63
End: 2023-06-08

## 2023-06-08 ENCOUNTER — NON-APPOINTMENT (OUTPATIENT)
Age: 63
End: 2023-06-08

## 2023-06-08 ENCOUNTER — APPOINTMENT (OUTPATIENT)
Dept: OPHTHALMOLOGY | Facility: CLINIC | Age: 63
End: 2023-06-08
Payer: MEDICAID

## 2023-06-08 PROCEDURE — 92014 COMPRE OPH EXAM EST PT 1/>: CPT

## 2023-06-08 PROCEDURE — 92133 CPTRZD OPH DX IMG PST SGM ON: CPT

## 2023-06-08 PROCEDURE — 92083 EXTENDED VISUAL FIELD XM: CPT

## 2023-06-09 ENCOUNTER — APPOINTMENT (OUTPATIENT)
Dept: ENDOCRINOLOGY | Facility: CLINIC | Age: 63
End: 2023-06-09
Payer: MEDICAID

## 2023-06-09 VITALS
DIASTOLIC BLOOD PRESSURE: 75 MMHG | OXYGEN SATURATION: 95 % | SYSTOLIC BLOOD PRESSURE: 115 MMHG | TEMPERATURE: 98.2 F | BODY MASS INDEX: 32.93 KG/M2 | WEIGHT: 230 LBS | HEART RATE: 90 BPM | HEIGHT: 70 IN

## 2023-06-09 DIAGNOSIS — E53.8 DEFICIENCY OF OTHER SPECIFIED B GROUP VITAMINS: ICD-10-CM

## 2023-06-09 DIAGNOSIS — Z87.19 PERSONAL HISTORY OF OTHER DISEASES OF THE DIGESTIVE SYSTEM: ICD-10-CM

## 2023-06-09 LAB
GLUCOSE BLDC GLUCOMTR-MCNC: 91
HBA1C MFR BLD HPLC: 8

## 2023-06-09 PROCEDURE — 95250 CONT GLUC MNTR PHYS/QHP EQP: CPT

## 2023-06-09 PROCEDURE — 99214 OFFICE O/P EST MOD 30 MIN: CPT

## 2023-06-09 PROCEDURE — 83036 HEMOGLOBIN GLYCOSYLATED A1C: CPT | Mod: QW

## 2023-06-12 ENCOUNTER — APPOINTMENT (OUTPATIENT)
Dept: ORTHOPEDIC SURGERY | Facility: CLINIC | Age: 63
End: 2023-06-12
Payer: MEDICAID

## 2023-06-12 VITALS
SYSTOLIC BLOOD PRESSURE: 122 MMHG | HEART RATE: 82 BPM | BODY MASS INDEX: 33.21 KG/M2 | HEIGHT: 70 IN | WEIGHT: 232 LBS | DIASTOLIC BLOOD PRESSURE: 83 MMHG

## 2023-06-12 DIAGNOSIS — M17.12 UNILATERAL PRIMARY OSTEOARTHRITIS, LEFT KNEE: ICD-10-CM

## 2023-06-12 DIAGNOSIS — M16.12 UNILATERAL PRIMARY OSTEOARTHRITIS, LEFT HIP: ICD-10-CM

## 2023-06-12 PROCEDURE — 73502 X-RAY EXAM HIP UNI 2-3 VIEWS: CPT | Mod: LT

## 2023-06-12 PROCEDURE — 99203 OFFICE O/P NEW LOW 30 MIN: CPT

## 2023-06-12 PROCEDURE — 73564 X-RAY EXAM KNEE 4 OR MORE: CPT | Mod: LT

## 2023-06-12 NOTE — HISTORY OF PRESENT ILLNESS
[de-identified] : 62 year old male  left knee pain x 1 year. denies trauma or injury. He complains of constant dull pain on the medial border of the patella worse with prolonged walking, kneeling, and sitting with his legs crossed. He has tried bracing which he feels was not helpful. He has not had any other treatment for the knee. He denies numbness or tingling. No prior injuries.

## 2023-06-12 NOTE — DISCUSSION/SUMMARY
[de-identified] : The patient has mild arthritis of the left knee and the left hip.  I have discussed the pathology, natural history and treatment options with the patient.  At this point his pain is intermittent and manageable and he requests no treatment.  If symptoms worsen he will consider over-the-counter medication.  He will return as needed.

## 2023-06-12 NOTE — PHYSICAL EXAM
[LE] : Sensory: Intact in bilateral lower extremities [DP] : dorsalis pedis 2+ and symmetric bilaterally [PT] : posterior tibial 2+ and symmetric bilaterally [Normal] : Alert and in no acute distress [Poor Appearance] : well-appearing [Acute Distress] : not in acute distress [Obese] : not obese [de-identified] : The patient has no respiratory distress. Mood and affect are normal. The patient is alert and oriented to person, place and time.\par Examination of the lumbar spine demonstrates no tenderness, no deformity and no muscle spasm. Lumbar spine range of motion is 90° of flexion, 10° of right and left lateral flexion. Neurologic exam of the lower extremities reveals intact sensation to light touch. Motor function is 5 over 5 in all groups. Deep tendon reflexes are 2+ and equal at the knee and ankle. Plantar reflexes are normal. Straight leg raise test is negative bilaterally. Trendelenburg test is negative.\par Examination of the hips demonstrates no tenderness.  There is mild discomfort with external rotation of the left hip.  Examination of the knees demonstrates medial peripatellar tenderness of the left knee.  Quadriceps and hamstring function are intact.  There is no instability of collateral or cruciate ligaments.  Range of motion 0 to 115 degrees of both knees.  The calves are soft and nontender.  The skin is intact.  There is no lymphedema. [de-identified] : AP, lateral, tunnel and sunrise x-rays of the left knee demonstrate no fracture or dislocation there are mild degenerative changes.  AP and lateral x-rays of the left hip with AP of the pelvis demonstrates no fracture or dislocation.  There are mild arthritic changes.

## 2023-06-13 LAB
25(OH)D3 SERPL-MCNC: 42.3 NG/ML
ALBUMIN SERPL ELPH-MCNC: 5 G/DL
ALP BLD-CCNC: 32 U/L
ALT SERPL-CCNC: 43 U/L
ANION GAP SERPL CALC-SCNC: 20 MMOL/L
AST SERPL-CCNC: 37 U/L
BILIRUB SERPL-MCNC: 0.8 MG/DL
BUN SERPL-MCNC: 14 MG/DL
CALCIUM SERPL-MCNC: 10.2 MG/DL
CHLORIDE SERPL-SCNC: 103 MMOL/L
CHOLEST SERPL-MCNC: 93 MG/DL
CO2 SERPL-SCNC: 20 MMOL/L
CREAT SERPL-MCNC: 1.07 MG/DL
CREAT SPEC-SCNC: 128 MG/DL
EGFR: 79 ML/MIN/1.73M2
ESTIMATED AVERAGE GLUCOSE: 177 MG/DL
FOLATE SERPL-MCNC: >20 NG/ML
FRUCTOSAMINE SERPL-MCNC: 313 UMOL/L
GLUCOSE SERPL-MCNC: 101 MG/DL
GLYCOMARK.: 0.9 UG/ML
HBA1C MFR BLD HPLC: 7.8 %
HDLC SERPL-MCNC: 38 MG/DL
LDLC SERPL CALC-MCNC: 28 MG/DL
LDLC SERPL DIRECT ASSAY-MCNC: 31 MG/DL
MAGNESIUM SERPL-MCNC: 1.9 MG/DL
MICROALBUMIN 24H UR DL<=1MG/L-MCNC: 3.5 MG/DL
MICROALBUMIN/CREAT 24H UR-RTO: 27 MG/G
NONHDLC SERPL-MCNC: 55 MG/DL
POTASSIUM SERPL-SCNC: 4.3 MMOL/L
PROT SERPL-MCNC: 7.8 G/DL
SODIUM SERPL-SCNC: 142 MMOL/L
T3FREE SERPL-MCNC: 2.69 PG/ML
T4 FREE SERPL-MCNC: 1.5 NG/DL
TRIGL SERPL-MCNC: 135 MG/DL
TSH SERPL-ACNC: 1 UIU/ML
VIT B12 SERPL-MCNC: 592 PG/ML

## 2023-06-18 NOTE — HISTORY OF PRESENT ILLNESS
[FreeTextEntry1] : Mr. KEVIN GOMEZ is a 62 year old male who returns with regard to a hx of type 2 dm. The diabetes has been present for about 8 years. \par He denies any history of retinopathy or nephropathy. Patient denies neuropathy. Generally feels well.\par \par For the diabetes, he is currently taking Glargine Insulin r 27 units QHS along with Metformin 1000 mg bid and Steglatro 15 mg. He too is taking  Repaglinide 2 mg 1 tablet pre lunch and pre dinner\par \par In late May he had been away for 12 days in [place]. He did forget his medications in [place]. He was able to buy fenofibrate, atorvastatin, insulin, metformin in [place] and was taking it. He was prescribed Ozempic \par Is now taking Ozempic 1 mg/week. He does report increased gas. Denies N/V and constipation. he does note appetite suppression. \par He was not checking BG while there. \par Since he has been back he did restart steglatro and repaglinide. \par \par current weight  230 lbl previously 236 lb in May 2023. \par \par HGM as of late AM 90-low 100s and before bed 100-140. There has been no significant hypoglycemia. \par \par  He denies polyuria, polydipsia, or any visual changes. He too denies any skin lesions, skin breakdown or non-healing areas of skin. He too denies any podiatric concerns. Ophthalmologic evaluation is up to date - -follows with Dr. Patricia Daniels.  Does use eye drops for increased IOP. \par \par POCT A1C returned today 8.0%\par POCT glucose returned today at   91  mg/dl\par \par pending trip to [place] in July for 1 month \par \par Additional medical history includes that of Celiac artery dissection "a few years ago" per patient; appeared on CT scan. Also hx of ED. Hx of Pancreatitis, Hypogonadism, Hypertriglyceridemia vitamin d deficiency. The pancreatitis occurred less than 5 years ago and was felt to be due to hypertriglyceridemia. \par \par Meds: Losartan, sildenafil, ASA, multivitamin, fenofibrate, atorvastatin 40 mg, omega-3,  sublingual b12 \par off vitamin D \par Family hx of Diabetes (mother and sister), glaucoma (father)\par \par Follows with cardiologist Dr. Cummins. \par PCP Dr. Samantha Carrasco. \par  \par

## 2023-06-18 NOTE — PHYSICAL EXAM
[Alert] : alert [Well Nourished] : well nourished [No Acute Distress] : no acute distress [Well Developed] : well developed [Normal Sclera/Conjunctiva] : normal sclera/conjunctiva [EOMI] : extra ocular movement intact [No Proptosis] : no proptosis [Normal Oropharynx] : the oropharynx was normal [Thyroid Not Enlarged] : the thyroid was not enlarged [No Thyroid Nodules] : no palpable thyroid nodules [No Respiratory Distress] : no respiratory distress [No Accessory Muscle Use] : no accessory muscle use [Normal S1, S2] : normal S1 and S2 [Clear to Auscultation] : lungs were clear to auscultation bilaterally [Normal Rate] : heart rate was normal [Regular Rhythm] : with a regular rhythm [No Edema] : no peripheral edema [Pedal Pulses Normal] : the pedal pulses are present [Normal Bowel Sounds] : normal bowel sounds [Not Tender] : non-tender [Not Distended] : not distended [Soft] : abdomen soft [Normal Anterior Cervical Nodes] : no anterior cervical lymphadenopathy [Normal Posterior Cervical Nodes] : no posterior cervical lymphadenopathy [No Spinal Tenderness] : no spinal tenderness [Spine Straight] : spine straight [No Stigmata of Cushings Syndrome] : no stigmata of Cushings Syndrome [Normal Gait] : normal gait [Normal Strength/Tone] : muscle strength and tone were normal [No Rash] : no rash [Normal Reflexes] : deep tendon reflexes were 2+ and symmetric [No Tremors] : no tremors [Oriented x3] : oriented to person, place, and time [Acanthosis Nigricans] : no acanthosis nigricans

## 2023-06-19 ENCOUNTER — APPOINTMENT (OUTPATIENT)
Dept: INTERNAL MEDICINE | Facility: CLINIC | Age: 63
End: 2023-06-19
Payer: MEDICAID

## 2023-06-19 VITALS
DIASTOLIC BLOOD PRESSURE: 78 MMHG | TEMPERATURE: 98.3 F | HEIGHT: 70 IN | HEART RATE: 98 BPM | BODY MASS INDEX: 33.64 KG/M2 | SYSTOLIC BLOOD PRESSURE: 116 MMHG | WEIGHT: 235 LBS | OXYGEN SATURATION: 97 %

## 2023-06-19 DIAGNOSIS — I77.79 DISSECTION OF OTHER SPECIFIED ARTERY: ICD-10-CM

## 2023-06-19 DIAGNOSIS — M25.562 PAIN IN LEFT KNEE: ICD-10-CM

## 2023-06-19 DIAGNOSIS — M25.569 PAIN IN UNSPECIFIED KNEE: ICD-10-CM

## 2023-06-19 DIAGNOSIS — R41.840 ATTENTION AND CONCENTRATION DEFICIT: ICD-10-CM

## 2023-06-19 DIAGNOSIS — Z72.0 TOBACCO USE: ICD-10-CM

## 2023-06-19 DIAGNOSIS — F32.A ANXIETY DISORDER, UNSPECIFIED: ICD-10-CM

## 2023-06-19 DIAGNOSIS — F41.9 ANXIETY DISORDER, UNSPECIFIED: ICD-10-CM

## 2023-06-19 DIAGNOSIS — F17.210 NICOTINE DEPENDENCE, CIGARETTES, UNCOMPLICATED: ICD-10-CM

## 2023-06-19 PROCEDURE — 99214 OFFICE O/P EST MOD 30 MIN: CPT

## 2023-06-19 NOTE — ASSESSMENT
[FreeTextEntry1] : Leech bites:\par Will treat with topical steroid. \par May need intra lesion steroid injection vs. excision, derm eval scheduled in August.  Patient will reach out if any worsening before then.  \par \par Irritability, inattention:\par Positive PHQ9 and GAD7. \par Discussed treatment for anxiety and depression as well as follow up for ADD testing.  \par Declines medication for now, handouts given for review.  \par Stress reduction measures discussed - sleep, exercise, medication\par Organization and prioritizing discussed with list keeping, journaling.  \par \par Current smoke with 40 pack year history - will schedule CT lung cancer screening\par \par H/o hypogonadism::\par Recheck testosterone today\par \par Diabetes:\par Stable, but A1C remains above goal \par Started Dexa sensor 2 weeks ago, no change in meds discussed adding pre breakfast prandin if post meal BG high \par Continues regular f/u with endo\par \par H/o HLD with very high trigs and 2/2 pancreatitis:\par Now well controlled with statin, fenofibrate, fish oil\par \par Sleep apnea:\par Continues CPAP treatment \par \par celiac artery disection:\par Stable, repeat CT in 1 year (May 2024).\par \par

## 2023-06-19 NOTE — REVIEW OF SYSTEMS
[Negative] : Heme/Lymph [FreeTextEntry7] : gassiness with ozempic  [de-identified] : see HPI  [de-identified] : see HPI

## 2023-06-19 NOTE — HISTORY OF PRESENT ILLNESS
[FreeTextEntry8] : 62 y.o. male, PMHx diabetes, pancreatitis 2/2 hypertriglyceridemia, anxiety, depression, obesity, PINKY (uses CPAP), incidental finding of celiac artery disection on abdominal imaging. \par \par Presents today with c/o bites spots on his feet and right hand for 3 months.  Was hiking in [place] and had 2 leeches on him - 1 on each foot, when he pulled one off it then attached to his hand.  Has had red lump in each area since that is very itchy.  Never open or oozy once the bleeding stopped.  Not painful.  Not getting better or worse.  \par \par Recent ortho eval for left knee pain - mild arthritis.  No further treatment for now.  \par \par Vascular f/u last month.  Stable CTA, 1 yr repeat advised.  \par \par Endo f/u earlier this month.  A1C slightly increased - 7.8.  Lipids excellent.  Albuminuria normal.  Had ozempic prescription from [place], but will d/c per endo recs d/t h/o pancreatitis.  \par \par Cardiology f/u last month.  Will go for f/u stress test and echo later this year.  Asymptomatic.  \par \par Concentration has been off recently.  Dislikes work a lot recently ().  Getting very irritable with people.  Watches TV if feeling stressed.  \par \par H/o smoking 1ppd x 40 years, current smoker.  Never did lung ca screening\par \par Would like to check testosterone.  \par

## 2023-06-19 NOTE — PHYSICAL EXAM
[No Acute Distress] : no acute distress [Well-Appearing] : well-appearing [Normal Voice/Communication] : normal voice/communication [No Carotid Bruits] : no carotid bruits [Pedal Pulses Present] : the pedal pulses are present [No Edema] : there was no peripheral edema [Normal] : affect was normal and insight and judgment were intact [de-identified] : hard, erythematous, nontender nodules at site of leech bites

## 2023-06-23 LAB
TESTOST FREE SERPL-MCNC: 5.5 PG/ML
TESTOST SERPL-MCNC: 243 NG/DL

## 2023-06-26 ENCOUNTER — RX RENEWAL (OUTPATIENT)
Age: 63
End: 2023-06-26

## 2023-08-04 ENCOUNTER — RX RENEWAL (OUTPATIENT)
Age: 63
End: 2023-08-04

## 2023-08-04 ENCOUNTER — OUTPATIENT (OUTPATIENT)
Dept: OUTPATIENT SERVICES | Facility: HOSPITAL | Age: 63
LOS: 1 days | End: 2023-08-04

## 2023-08-04 ENCOUNTER — APPOINTMENT (OUTPATIENT)
Dept: DERMATOLOGY | Facility: CLINIC | Age: 63
End: 2023-08-04
Payer: MEDICAID

## 2023-08-04 VITALS
BODY MASS INDEX: 33.21 KG/M2 | DIASTOLIC BLOOD PRESSURE: 76 MMHG | HEIGHT: 70 IN | WEIGHT: 232 LBS | SYSTOLIC BLOOD PRESSURE: 120 MMHG | HEART RATE: 87 BPM | OXYGEN SATURATION: 98 %

## 2023-08-04 DIAGNOSIS — R22.9 LOCALIZED SWELLING, MASS AND LUMP, UNSPECIFIED: ICD-10-CM

## 2023-08-04 DIAGNOSIS — D48.5 NEOPLASM OF UNCERTAIN BEHAVIOR OF SKIN: ICD-10-CM

## 2023-08-04 PROCEDURE — 99213 OFFICE O/P EST LOW 20 MIN: CPT | Mod: 25

## 2023-08-04 PROCEDURE — 99203 OFFICE O/P NEW LOW 30 MIN: CPT | Mod: 25

## 2023-08-04 RX ORDER — TRIAMCINOLONE ACETONIDE 1 MG/G
0.1 CREAM TOPICAL
Qty: 1 | Refills: 1 | Status: ACTIVE | COMMUNITY
Start: 2023-08-04 | End: 1900-01-01

## 2023-08-04 NOTE — ASSESSMENT
[FreeTextEntry1] : #Nodule, non-healing  DDx infectious vs inflammatory  Presentation secondary to initial leech bites while hiking in forest in [place]  Notes non-healing, and continuid pruritis  Unclear etiology, discussed with patient possible infectious source introduced from leech bite  Recommended biopsy for tissue culture and H&E to better characterize  Patient agrees with plan.  5mm punch biopsy as per below. Specimen transected and sent seperately for tissue culture and H&E  #Neoplasm of uncertain behavior in skin  Non-healing papulonodule L dorsal foot  Punch biopsy procedure note Punch biopsy diameter: 5mm Sutures used: 4.0 ethylon Alternatives to this procedure, benefits of, and risks including bleeding, scarring, and infection were explained to the patient. Informed consent was documented and a consent form was signed by the patient and surgeon. The lesion was cleaned with alcohol and anesthetized with 1% lidocaine with epinephrine. A sterile punch instrument was used to incise a circular piece of tissue into the superficial subcutaneous plane, and the wound was closed with sutures above. The specimen was sent to pathology. Vaseline and a bandage were applied. The patient tolerated the procedure well with minimal blood loss and no complications. Post-op instructions were given. There were no pre-op or post-op medications. The patient was instructed to keep the biopsy site dry and covered for 24 hours. After that, they may wash gently the area daily with mild soap and water and apply clean vaseline and a Bandaid until it has healed. The patient was advised to contact us if they develop persistent bleeding, redness, swelling, increasing pain, or pus draining from the site. Patient's phone number was obtained for reporting of results. Patient agreed to leaving of message with results if phone is not answered.  RTC 2 weeks, suture removal   Silverio Benitez MD PGY-2, [facility]

## 2023-08-04 NOTE — PHYSICAL EXAM
[FreeTextEntry3] : Left dorsal foot with a pink to brown papulonodule with central hemorrhagic crust scattered few pink papules on arms bilaterally

## 2023-08-07 ENCOUNTER — NON-APPOINTMENT (OUTPATIENT)
Age: 63
End: 2023-08-07

## 2023-08-08 ENCOUNTER — APPOINTMENT (OUTPATIENT)
Dept: UROLOGY | Facility: CLINIC | Age: 63
End: 2023-08-08
Payer: MEDICAID

## 2023-08-08 DIAGNOSIS — G47.33 OBSTRUCTIVE SLEEP APNEA (ADULT) (PEDIATRIC): ICD-10-CM

## 2023-08-08 PROCEDURE — 99214 OFFICE O/P EST MOD 30 MIN: CPT

## 2023-08-08 NOTE — PHYSICAL EXAM
[Normal Appearance] : normal appearance [Well Groomed] : well groomed [Edema] : no peripheral edema [Exaggerated Use Of Accessory Muscles For Inspiration] : no accessory muscle use [Abdomen Tenderness] : non-tender [Costovertebral Angle Tenderness] : no ~M costovertebral angle tenderness [Urethral Meatus] : meatus normal [Penis Abnormality] : normal uncircumcised penis [Epididymis] : the epididymides were normal [Testes Tenderness] : no tenderness of the testes [Testes Mass (___cm)] : there were no testicular masses [Prostate Enlargement] : the prostate was not enlarged [Prostate Tenderness] : the prostate was not tender [No Prostate Nodules] : no prostate nodules [] : no rash [Sensation] : the sensory exam was normal to light touch and pinprick [Oriented To Time, Place, And Person] : oriented to person, place, and time

## 2023-08-08 NOTE — ASSESSMENT
[FreeTextEntry1] : 63 y.o. M with:  #Low T, low libido - Repeat hormonal panel with total and free testosterone, prolactin, LH, FSH, estradiol - CBC, PSA - We discussed testosterone replacement.  We discussed that there are multiple methods of administration: injectable form, topical form, implantable pellets. - We discussed the risk and benefits of testosterone replacement.  We discussed that this may improve erectile function, libido, bone mineral density, lean body mass, however, the effect on fatigue, quality of life, and cognitive function is inconclusive. - Discussed the risks of polycythemia, and the need for ongoing laboratory monitoring.  Discussed that if Hct rises, we may have to consider cessation of therapy, dose adjustment, alternative routes of administration, hematology referral, or therapeutic phlebotomy - Discussed the relationship between testosterone and prostate cancer.  Discussed that there is limited data to suggest to link testosterone replacement and the development of prostate cancer, however, this can be potentially unsafe in men with known prostate cancer. Discussed the need for PSA monitoring with digital rectal examinations. - Discussed at low testosterone is a risk factor for cardiovascular events, however, there is also some data to suggest that testosterone replacement can increase the risk of cardiovascular events.  Discussed the FDA black box warning on the risk of cardiovascular events with testosterone replacement. Discussed the AUA guideline consensus statement that it cannot be stated definitively whether testosterone therapy increases or decreases the risk of cardiovascular events  -Discussed that contraindication of testosterone replacement including known prostate or breast cancer, severe lower urinary tract symptoms, hematocrit >50 percent, untreated severe sleep apnea, prostate-specific antigen (PSA) concentration >4.0 mcg/L, or >3.0 mcg/L in high-risk men ( Americans or men with first-degree relatives with prostate cancer), or uncontrolled heart failure.  - TTM 2 weeks to re-discuss initiation of TRT  #ED - Transition from prn sildenafil to daily cialis given co-existing BPH symptoms  #LUTS - AWAIS with 40g prostate - PVR 80 mL - UA - Daily cialis  #Intermittent balanitis - Offered circumcision. He would like to think about this

## 2023-08-08 NOTE — HISTORY OF PRESENT ILLNESS
[FreeTextEntry1] : KEVIN GOMEZ is a 63 year M who presents today as a new patient evaluation for low testosterone, LUTS  #Low T, low libido, ED  Testosterone 243 ng/dL Free testosterone 5.5 Low libido Also intermittent ED - on sildenafil 100mg. Works intermittently well Has DM - on insulin History of diabetes, hypertension, hyperlipidemia  #LUTS PSA 11/2022 - 2.32 No family history of prostate / kidney cancer Reports a slow flow. No gross hematuria. No UTIs. No incontinence AUASS: 10 - 0/1/1/3/3/0/2 QOL 3 Smoked 40 years, 1 ppd. Currently still smoking  #History of balanitis Intermittent balanitis. Uses topical cream when symptoms arise  Anticoagulation: ASA 81mg All: PCNs Social: Current smoker 1 ppd x 40+ years PMHx: HTN, DM, HLD, celiac artery disection PSHx: None FHx: No family history of  malignancy   Denies gross hematuria, flank pain, fevers, chills, nausea, vomiting.

## 2023-08-09 LAB
APPEARANCE: CLEAR
BACTERIA: NEGATIVE /HPF
BILIRUBIN URINE: NEGATIVE
BLOOD URINE: NEGATIVE
CAST: 0 /LPF
COLOR: YELLOW
EPITHELIAL CELLS: 0 /HPF
GLUCOSE QUALITATIVE U: >=1000 MG/DL
KETONES URINE: NEGATIVE MG/DL
LEUKOCYTE ESTERASE URINE: NEGATIVE
MICROSCOPIC-UA: NORMAL
NITRITE URINE: NEGATIVE
PH URINE: 5.5
PROTEIN URINE: NEGATIVE MG/DL
RED BLOOD CELLS URINE: 0 /HPF
SPECIFIC GRAVITY URINE: >1.03
UROBILINOGEN URINE: 0.2 MG/DL
WHITE BLOOD CELLS URINE: 0 /HPF

## 2023-08-10 ENCOUNTER — RX RENEWAL (OUTPATIENT)
Age: 63
End: 2023-08-10

## 2023-08-10 LAB
ESTRADIOL SERPL-MCNC: 23 PG/ML
FSH SERPL-MCNC: 8.8 IU/L
LH SERPL-ACNC: 10.9 IU/L
PROLACTIN SERPL-MCNC: 6.3 NG/ML
PSA SERPL-MCNC: 1.97 NG/ML
TESTOST SERPL-MCNC: 431 NG/DL

## 2023-08-11 LAB — BACTERIA TISS CULT: ABNORMAL

## 2023-08-18 ENCOUNTER — APPOINTMENT (OUTPATIENT)
Dept: DERMATOLOGY | Facility: CLINIC | Age: 63
End: 2023-08-18
Payer: MEDICAID

## 2023-08-18 ENCOUNTER — OUTPATIENT (OUTPATIENT)
Dept: OUTPATIENT SERVICES | Facility: HOSPITAL | Age: 63
LOS: 1 days | End: 2023-08-18

## 2023-08-18 VITALS
BODY MASS INDEX: 32.64 KG/M2 | WEIGHT: 228 LBS | HEIGHT: 70 IN | HEART RATE: 81 BPM | OXYGEN SATURATION: 97 % | DIASTOLIC BLOOD PRESSURE: 70 MMHG | SYSTOLIC BLOOD PRESSURE: 116 MMHG

## 2023-08-18 DIAGNOSIS — L81.0 POSTINFLAMMATORY HYPERPIGMENTATION: ICD-10-CM

## 2023-08-18 DIAGNOSIS — T14.8XXA OTHER INJURY OF UNSPECIFIED BODY REGION, INITIAL ENCOUNTER: ICD-10-CM

## 2023-08-18 PROCEDURE — 99213 OFFICE O/P EST LOW 20 MIN: CPT

## 2023-08-29 LAB — DERMATOLOGY BIOPSY: NORMAL

## 2023-08-30 ENCOUNTER — APPOINTMENT (OUTPATIENT)
Dept: UROLOGY | Facility: CLINIC | Age: 63
End: 2023-08-30

## 2023-10-04 ENCOUNTER — APPOINTMENT (OUTPATIENT)
Dept: UROLOGY | Facility: CLINIC | Age: 63
End: 2023-10-04
Payer: MEDICAID

## 2023-10-04 DIAGNOSIS — N52.9 MALE ERECTILE DYSFUNCTION, UNSPECIFIED: ICD-10-CM

## 2023-10-04 PROCEDURE — 99214 OFFICE O/P EST MOD 30 MIN: CPT

## 2023-10-17 ENCOUNTER — RX RENEWAL (OUTPATIENT)
Age: 63
End: 2023-10-17

## 2023-10-17 RX ORDER — ASPIRIN 81 MG/1
81 TABLET, COATED ORAL DAILY
Qty: 360 | Refills: 3 | Status: ACTIVE | COMMUNITY
Start: 2021-04-01 | End: 1900-01-01

## 2023-10-17 RX ORDER — OMEGA-3-ACID ETHYL ESTERS CAPSULES 1 G/1
1 CAPSULE, LIQUID FILLED ORAL
Qty: 360 | Refills: 3 | Status: ACTIVE | COMMUNITY
Start: 2023-04-04 | End: 1900-01-01

## 2023-10-27 ENCOUNTER — RX RENEWAL (OUTPATIENT)
Age: 63
End: 2023-10-27

## 2023-10-30 ENCOUNTER — RX RENEWAL (OUTPATIENT)
Age: 63
End: 2023-10-30

## 2023-11-15 ENCOUNTER — NON-APPOINTMENT (OUTPATIENT)
Age: 63
End: 2023-11-15

## 2023-11-20 ENCOUNTER — APPOINTMENT (OUTPATIENT)
Dept: INTERNAL MEDICINE | Facility: CLINIC | Age: 63
End: 2023-11-20
Payer: MEDICAID

## 2023-11-20 VITALS
WEIGHT: 229 LBS | HEART RATE: 74 BPM | OXYGEN SATURATION: 95 % | BODY MASS INDEX: 32.42 KG/M2 | HEIGHT: 70.5 IN | SYSTOLIC BLOOD PRESSURE: 115 MMHG | TEMPERATURE: 99.4 F | DIASTOLIC BLOOD PRESSURE: 66 MMHG

## 2023-11-20 DIAGNOSIS — Z00.00 ENCOUNTER FOR GENERAL ADULT MEDICAL EXAMINATION W/OUT ABNORMAL FINDINGS: ICD-10-CM

## 2023-11-20 PROCEDURE — 99396 PREV VISIT EST AGE 40-64: CPT

## 2023-11-22 LAB
25(OH)D3 SERPL-MCNC: 37.2 NG/ML
ALBUMIN SERPL ELPH-MCNC: 4.9 G/DL
ALP BLD-CCNC: 33 U/L
ALT SERPL-CCNC: 42 U/L
AMYLASE/CREAT SERPL: 182 U/L
ANION GAP SERPL CALC-SCNC: 13 MMOL/L
AST SERPL-CCNC: 30 U/L
BILIRUB SERPL-MCNC: 0.6 MG/DL
BUN SERPL-MCNC: 19 MG/DL
CALCIUM SERPL-MCNC: 10.1 MG/DL
CHLORIDE SERPL-SCNC: 103 MMOL/L
CHOLEST SERPL-MCNC: 116 MG/DL
CK SERPL-CCNC: 206 U/L
CO2 SERPL-SCNC: 24 MMOL/L
CREAT SERPL-MCNC: 1.13 MG/DL
CREAT SPEC-SCNC: 103 MG/DL
EGFR: 73 ML/MIN/1.73M2
ESTIMATED AVERAGE GLUCOSE: 174 MG/DL
FOLATE SERPL-MCNC: 18.8 NG/ML
GLUCOSE SERPL-MCNC: 242 MG/DL
HBA1C MFR BLD HPLC: 7.7 %
HCT VFR BLD CALC: 48.2 %
HDLC SERPL-MCNC: 38 MG/DL
HGB BLD-MCNC: 15.9 G/DL
LDLC SERPL CALC-MCNC: 49 MG/DL
LPL SERPL-CCNC: 52 U/L
M TB IFN-G BLD-IMP: NEGATIVE
MCHC RBC-ENTMCNC: 29.7 PG
MCHC RBC-ENTMCNC: 33 GM/DL
MCV RBC AUTO: 89.9 FL
MICROALBUMIN 24H UR DL<=1MG/L-MCNC: 2.7 MG/DL
MICROALBUMIN/CREAT 24H UR-RTO: 26 MG/G
NONHDLC SERPL-MCNC: 77 MG/DL
PLATELET # BLD AUTO: 233 K/UL
POTASSIUM SERPL-SCNC: 4.1 MMOL/L
PROT SERPL-MCNC: 7.6 G/DL
QUANTIFERON TB PLUS MITOGEN MINUS NIL: >10 IU/ML
QUANTIFERON TB PLUS NIL: 0.02 IU/ML
QUANTIFERON TB PLUS TB1 MINUS NIL: 0 IU/ML
QUANTIFERON TB PLUS TB2 MINUS NIL: 0 IU/ML
RBC # BLD: 5.36 M/UL
RBC # FLD: 13.2 %
SODIUM SERPL-SCNC: 140 MMOL/L
TRIGL SERPL-MCNC: 168 MG/DL
TSH SERPL-ACNC: 1.48 UIU/ML
VIT B12 SERPL-MCNC: 885 PG/ML
WBC # FLD AUTO: 6.34 K/UL

## 2023-11-28 NOTE — DISCUSSION/SUMMARY
[FreeTextEntry1] : This is a 61-year-old male with past medical history significant for insulin-dependent diabetes mellitus, hyperlipidemia, hypertension, who comes in for cardiac follow-up evaluation he denies chest pain, dizziness, palpitations or syncope.  He does complain of dyspnea on exertion which can occur with walking longer distance on flat ground or walking uphill.  He was born in [place] and has no history of rheumatic fever.  He does not drink excessive caffeine or alcohol.\par His cardiac risk factors include  hyperlipidemia, insulin-dependent diabetes mellitus, and smoking 1 pack per day for 40 years.\par Exercise stress test done March 4, 2022 demonstrated no evidence of myocardial ischemia.\par Blood work done February 1, 2022 demonstrated a total cholesterol 116, HDL of 40, triglycerides 199, LDL direct 49, non-HDL cholesterol 76, and calculated LDL of 36 mg/dL.\par The patient will continue on his current dose of atorvastatin 40 mg daily.  He will follow-up with his endocrinologist.\par He is currently hemodynamically stable and asymptomatic from a cardiac standpoint.\par \par Patient is seeing a dietician for his diabetes mellitus.  Patient works as a . Currently taking 12.5mg of Losartan daily. \par Electrocardiogram done January 31, 2022 demonstrate normal sinus rhythm rate of 80 bpm is otherwise unremarkable.\par Blood work done October 12, 2021 demonstrated hemoglobin A1c of 7.9, cholesterol 116 mg/dL, triglycerides 222 mg/dL, HDL of 39, LDL calculated 33 mg/dL, and non-HDL cholesterol 77 mg/dL.\par The patient will continue his current dose of Lipitor 40 mg/day.  His LDL target is less than 70 mg/dL.  Smoking cessation was discussed.\par \par The patient's blood pressure is slightly elevated today and I have asked him to change his losartan to 1 full tablet per day of 25 mg.  He is on aspirin 81 mg/day for primary prevention.\par Further recommendations were made after the above-noted diagnostic tests are completed.\par \par Family history- mom diabetes\par CRF- hld, dm, smoker\par \par Labs from 10/12/2021 Cholesterol 116, Triglyceride 222, HDL 39, LDL 33. \par  Detail Level: Zone

## 2023-11-29 ENCOUNTER — APPOINTMENT (OUTPATIENT)
Dept: CARDIOLOGY | Facility: CLINIC | Age: 63
End: 2023-11-29
Payer: MEDICAID

## 2023-11-29 VITALS
TEMPERATURE: 97.5 F | SYSTOLIC BLOOD PRESSURE: 120 MMHG | BODY MASS INDEX: 32.21 KG/M2 | HEART RATE: 90 BPM | HEIGHT: 70 IN | OXYGEN SATURATION: 95 % | WEIGHT: 225 LBS | RESPIRATION RATE: 16 BRPM | DIASTOLIC BLOOD PRESSURE: 81 MMHG

## 2023-11-29 VITALS
HEIGHT: 70 IN | HEART RATE: 90 BPM | TEMPERATURE: 97.5 F | OXYGEN SATURATION: 95 % | WEIGHT: 225 LBS | BODY MASS INDEX: 32.21 KG/M2 | RESPIRATION RATE: 16 BRPM

## 2023-11-29 DIAGNOSIS — I07.1 RHEUMATIC TRICUSPID INSUFFICIENCY: ICD-10-CM

## 2023-11-29 DIAGNOSIS — I34.0 NONRHEUMATIC MITRAL (VALVE) INSUFFICIENCY: ICD-10-CM

## 2023-11-29 DIAGNOSIS — R06.09 OTHER FORMS OF DYSPNEA: ICD-10-CM

## 2023-11-29 PROCEDURE — 93015 CV STRESS TEST SUPVJ I&R: CPT

## 2023-11-29 PROCEDURE — 99213 OFFICE O/P EST LOW 20 MIN: CPT | Mod: 25

## 2023-11-29 PROCEDURE — 93306 TTE W/DOPPLER COMPLETE: CPT

## 2023-11-30 ENCOUNTER — NON-APPOINTMENT (OUTPATIENT)
Age: 63
End: 2023-11-30

## 2023-11-30 ENCOUNTER — APPOINTMENT (OUTPATIENT)
Dept: OPHTHALMOLOGY | Facility: CLINIC | Age: 63
End: 2023-11-30
Payer: MEDICAID

## 2023-11-30 PROCEDURE — 92012 INTRM OPH EXAM EST PATIENT: CPT

## 2023-11-30 PROCEDURE — 92133 CPTRZD OPH DX IMG PST SGM ON: CPT

## 2023-12-01 ENCOUNTER — RX RENEWAL (OUTPATIENT)
Age: 63
End: 2023-12-01

## 2023-12-11 ENCOUNTER — NON-APPOINTMENT (OUTPATIENT)
Age: 63
End: 2023-12-11

## 2023-12-11 ENCOUNTER — APPOINTMENT (OUTPATIENT)
Dept: THORACIC SURGERY | Facility: CLINIC | Age: 63
End: 2023-12-11
Payer: MEDICAID

## 2023-12-11 VITALS — HEIGHT: 70 IN | WEIGHT: 225 LBS | BODY MASS INDEX: 32.21 KG/M2

## 2023-12-11 DIAGNOSIS — F17.210 NICOTINE DEPENDENCE, CIGARETTES, UNCOMPLICATED: ICD-10-CM

## 2023-12-11 DIAGNOSIS — F17.200 NICOTINE DEPENDENCE, UNSPECIFIED, UNCOMPLICATED: ICD-10-CM

## 2023-12-11 PROCEDURE — G0296 VISIT TO DETERM LDCT ELIG: CPT

## 2023-12-12 ENCOUNTER — APPOINTMENT (OUTPATIENT)
Dept: CT IMAGING | Facility: IMAGING CENTER | Age: 63
End: 2023-12-12
Payer: MEDICAID

## 2023-12-12 ENCOUNTER — OUTPATIENT (OUTPATIENT)
Dept: OUTPATIENT SERVICES | Facility: HOSPITAL | Age: 63
LOS: 1 days | End: 2023-12-12
Payer: MEDICAID

## 2023-12-12 DIAGNOSIS — Z00.00 ENCOUNTER FOR GENERAL ADULT MEDICAL EXAMINATION WITHOUT ABNORMAL FINDINGS: ICD-10-CM

## 2023-12-12 PROCEDURE — 71271 CT THORAX LUNG CANCER SCR C-: CPT

## 2023-12-12 PROCEDURE — 71271 CT THORAX LUNG CANCER SCR C-: CPT | Mod: 26

## 2023-12-22 ENCOUNTER — TRANSCRIPTION ENCOUNTER (OUTPATIENT)
Age: 63
End: 2023-12-22

## 2023-12-23 PROBLEM — F17.210 CIGARETTE SMOKER: Status: ACTIVE | Noted: 2023-12-23

## 2023-12-23 NOTE — HISTORY OF PRESENT ILLNESS
[TextBox_13] : Referred by Dr. Carrasco  Mr. KEVIN GOMEZ is a 63 year old man with a history of nicotine dependence   Over the telephone today we reviewed and confirmed that the patient meets screening eligibility criteria:  -Age: 63 years old  Smoking status:  -Current smoker  -Number of pack(s) per day: 1  -Number of years smoked:40  -Number of pack years smokin     Mr. GOMEZ denies any personal history of lung cancer. Denies any s/s of lung cancer. No lung cancer in a 1st degree relative. Denies any history of lung disease. Denies any history of occupational exposures [N_Years] : 40 [PacksperDay] : 1 [PacksperYear] : 40

## 2024-01-11 NOTE — DISCHARGE NOTE NURSING/CASE MANAGEMENT/SOCIAL WORK - NSDCPETBCESMAN_GEN_ALL_CORE
Comments: No answer when calling.    Last Office Visit (last PCP visit):   11/23/2022    Next Visit Date:  No future appointments.    **If hasn't been seen in over a year OR hasn't followed up according to last diabetes/ADHD visit, make appointment for patient before sending refill to provider.    Rx requested:  Requested Prescriptions     Pending Prescriptions Disp Refills    sertraline (ZOLOFT) 25 MG tablet [Pharmacy Med Name: sertraline 25 mg tablet] 30 tablet 11     Sig: Take 1 tablet by mouth daily                If you are a smoker, it is important for your health to stop smoking. Please be aware that second hand smoke is also harmful.

## 2024-01-15 ENCOUNTER — RX RENEWAL (OUTPATIENT)
Age: 64
End: 2024-01-15

## 2024-01-22 ENCOUNTER — RX RENEWAL (OUTPATIENT)
Age: 64
End: 2024-01-22

## 2024-01-22 RX ORDER — FENOFIBRATE 145 MG/1
145 TABLET, COATED ORAL
Qty: 90 | Refills: 3 | Status: ACTIVE | COMMUNITY
Start: 2023-04-04 | End: 1900-01-01

## 2024-01-24 ENCOUNTER — RX RENEWAL (OUTPATIENT)
Age: 64
End: 2024-01-24

## 2024-01-29 ENCOUNTER — RX RENEWAL (OUTPATIENT)
Age: 64
End: 2024-01-29

## 2024-01-30 ENCOUNTER — OUTPATIENT (OUTPATIENT)
Dept: OUTPATIENT SERVICES | Facility: HOSPITAL | Age: 64
LOS: 1 days | End: 2024-01-30
Payer: MEDICAID

## 2024-01-30 ENCOUNTER — APPOINTMENT (OUTPATIENT)
Dept: CT IMAGING | Facility: IMAGING CENTER | Age: 64
End: 2024-01-30
Payer: MEDICAID

## 2024-01-30 DIAGNOSIS — I72.9 ANEURYSM OF UNSPECIFIED SITE: ICD-10-CM

## 2024-01-30 PROCEDURE — 71275 CT ANGIOGRAPHY CHEST: CPT | Mod: 26

## 2024-01-30 PROCEDURE — 71275 CT ANGIOGRAPHY CHEST: CPT

## 2024-01-30 RX ORDER — INSULIN GLARGINE-YFGN 100 [IU]/ML
100 INJECTION, SOLUTION SUBCUTANEOUS
Qty: 2 | Refills: 1 | Status: DISCONTINUED | COMMUNITY
Start: 2024-01-25 | End: 2024-01-30

## 2024-02-16 ENCOUNTER — APPOINTMENT (OUTPATIENT)
Dept: CARDIOTHORACIC SURGERY | Facility: CLINIC | Age: 64
End: 2024-02-16
Payer: MEDICAID

## 2024-02-16 VITALS
HEART RATE: 70 BPM | DIASTOLIC BLOOD PRESSURE: 88 MMHG | RESPIRATION RATE: 16 BRPM | OXYGEN SATURATION: 98 % | TEMPERATURE: 98.2 F | BODY MASS INDEX: 32.21 KG/M2 | SYSTOLIC BLOOD PRESSURE: 117 MMHG | HEIGHT: 70 IN | WEIGHT: 225 LBS

## 2024-02-16 DIAGNOSIS — E78.1 PURE HYPERGLYCERIDEMIA: ICD-10-CM

## 2024-02-16 DIAGNOSIS — R01.1 CARDIAC MURMUR, UNSPECIFIED: ICD-10-CM

## 2024-02-16 DIAGNOSIS — I72.9 ANEURYSM OF UNSPECIFIED SITE: ICD-10-CM

## 2024-02-16 PROCEDURE — 99203 OFFICE O/P NEW LOW 30 MIN: CPT

## 2024-02-16 RX ORDER — CLOTRIMAZOLE AND BETAMETHASONE DIPROPIONATE 10; .5 MG/G; MG/G
1-0.05 CREAM TOPICAL TWICE DAILY
Qty: 1 | Refills: 1 | Status: COMPLETED | COMMUNITY
Start: 2023-02-07 | End: 2024-02-16

## 2024-02-16 RX ORDER — BLOOD-GLUCOSE SENSOR
EACH MISCELLANEOUS
Qty: 9 | Refills: 3 | Status: COMPLETED | COMMUNITY
Start: 2023-06-14 | End: 2024-02-16

## 2024-02-16 RX ORDER — ALCOHOL ANTISEPTIC PADS
70 PADS, MEDICATED (EA) TOPICAL
Qty: 120 | Refills: 4 | Status: COMPLETED | COMMUNITY
Start: 2022-11-29 | End: 2024-02-16

## 2024-02-16 RX ORDER — PEN NEEDLE, DIABETIC 32GX 5/32"
32G X 4 MM NEEDLE, DISPOSABLE MISCELLANEOUS
Qty: 4 | Refills: 5 | Status: COMPLETED | COMMUNITY
Start: 2021-03-31 | End: 2024-02-16

## 2024-02-16 RX ORDER — ISOPROPYL ALCOHOL 0.7 ML/ML
SWAB TOPICAL
Qty: 100 | Refills: 5 | Status: COMPLETED | COMMUNITY
Start: 2020-11-09 | End: 2024-02-16

## 2024-02-16 NOTE — ASSESSMENT
[FreeTextEntry1] : Mr. GOMEZ is a 63 year old male with  past medical history of Hyperlipidemia, Hypertension, T2DM, PINKY on CPAP , Current smoker (1PPD/ 40 yrs) with complaints of dyspnea on exertion with walking for x yrs  . He can walk upto 1 mile/ 3 FOS without shortness of breath. He uses 1 pillow to sleep. He went for lung surveillance scan and had an incidental finding of enlarged aorta.  He is referred by Dr. Valerie Carrasco for initial evaluation and management for  focal outpouching  along the anterior aspect of the distal transverse aortic arch, consistent with ductus diverticulum. Denies any chest pain, palpitations, dizziness , PND or pedal edema.   Dr. Chandrakant Arriaga reviewed the diagnostic images and explained to the patient. 11/29/23 TTE revealed Left ventricular systolic function is normal with an ejection fraction of 65 % by visual interpretation visually estimated at 65 to 70 %. 2. Mild left ventricular hypertrophy. 3. Aortic root at the sinuses of Valsalva is dilated, measuring 3.90 cm (indexed 1.76 cm/m). Ascending aorta diameter is dilated, measuring 4.00 cm (indexed 1.81 cm/m). 4. Trace aortic regurgitation. 5. Minimal pulmonic regurgitation. 6. Physiologic mitral regurgitation. 7. Minimal tricuspid regurgitation.  1/30/24 CTA Chest revealed The ascending aorta is dilated measuring 4.5 cm. Focal outpouching  along the anterior aspect of the distal transverse aortic arch, consistent with ductus diverticulum. Left aortic arch with normal branching pattern without stenosis. Thoracic aorta diameters: *  Aortic root: 3.8 x 2.9 x 3.9 cm (sinus to commissure) *  Mid ascending: 4.4 x 4.4 cm *  Transverse arch in between the left common carotid and left subclavian artery: 3.2 x 3.8 cm *  Isthmus: 4 x 5.1 cm *  Mid descending: 2.9 x 3 cm *  Diaphragm level: 2.6 x 2.6 cm  Plan: 1. Follow up in 6 months with CTA chest for aortic surveillance  2. Follow up with cardiologist and PCP  3. Continue current medication regimen 4. Encourage stop smoking 5.  May return to clinic on PRN basis

## 2024-02-16 NOTE — END OF VISIT
[FreeTextEntry3] :  I, Dr. Chandrakant Arriaga   , personally performed the evaluation and management (E/M) services for this new patient. That E/M includes conducting the initial examination, assessing all conditions, and establishing the plan of care. Today, JOSE HOOPER  was here to observe my evaluation and management services for this patient.

## 2024-02-16 NOTE — DATA REVIEWED
[FreeTextEntry1] : 11/29/23 TTE revealed Left ventricular systolic function is normal with an ejection fraction of 65 % by visual interpretation visually estimated at 65 to 70 %. 2. Mild left ventricular hypertrophy. 3. Aortic root at the sinuses of Valsalva is dilated, measuring 3.90 cm (indexed 1.76 cm/m). Ascending aorta diameter is dilated, measuring 4.00 cm (indexed 1.81 cm/m). 4. Trace aortic regurgitation. 5. Minimal pulmonic regurgitation. 6. Physiologic mitral regurgitation. 7. Minimal tricuspid regurgitation.  1/30/24 CTA Chest revealed The ascending aorta is dilated measuring 4.5 cm. Focal outpouching  along the anterior aspect of the distal transverse aortic arch, consistent with ductus diverticulum. Left aortic arch with normal branching pattern without stenosis. Thoracic aorta diameters:  *  Aortic root: 3.8 x 2.9 x 3.9 cm (sinus to commissure) *  Mid ascending: 4.4 x 4.4 cm *  Transverse arch in between the left common carotid and left subclavian artery: 3.2 x 3.8 cm *  Isthmus: 4 x 5.1 cm *  Mid descending: 2.9 x 3 cm *  Diaphragm level: 2.6 x 2.6 cm

## 2024-02-16 NOTE — HISTORY OF PRESENT ILLNESS
[FreeTextEntry1] :  Mr. GOMEZ is a 63 year old male with  past medical history of Hyperlipidemia, Hypertension, T2DM, PINKY on CPAP , Current smoker (1PPD/ 40 yrs) with complaints of dyspnea on exertion with walking for x yrs  . He can walk up to 1 mile/ 3 FOS without shortness of breath. He uses 1 pillow to sleep. He went for lung surveillance scan and had an incidental finding of enlarged aorta.  He is referred by Dr. Valerie Carrasco for initial evaluation and management for  focal outpouching  along the anterior aspect of the distal transverse aortic arch, consistent with ductus diverticulum. Denies any chest pain, palpitations, dizziness , PND or pedal edema.      NYHA class II

## 2024-02-16 NOTE — PHYSICAL EXAM
[General Appearance - Alert] : alert [General Appearance - In No Acute Distress] : in no acute distress [General Appearance - Well Nourished] : well nourished [General Appearance - Well Developed] : well developed [Sclera] : the sclera and conjunctiva were normal [PERRL With Normal Accommodation] : pupils were equal in size, round, and reactive to light [Outer Ear] : the ears and nose were normal in appearance [Hearing Threshold Finger Rub Not [place]] : hearing was normal [Both Tympanic Membranes Were Examined] : both tympanic membranes were normal [Neck Appearance] : the appearance of the neck was normal [] : no respiratory distress [Respiration, Rhythm And Depth] : normal respiratory rhythm and effort [Auscultation Breath Sounds / Voice Sounds] : lungs were clear to auscultation bilaterally [Apical Impulse] : the apical impulse was normal [Heart Rate And Rhythm] : heart rate was normal and rhythm regular [Heart Sounds] : normal S1 and S2 [Murmurs] : no murmurs [Examination Of The Chest] : the chest was normal in appearance [2+] : left 2+ [Breast Appearance] : normal in appearance [Bowel Sounds] : normal bowel sounds [Abdomen Soft] : soft [No CVA Tenderness] : no ~M costovertebral angle tenderness [Abnormal Walk] : normal gait [Involuntary Movements] : no involuntary movements were seen [Skin Color & Pigmentation] : normal skin color and pigmentation [Skin Turgor] : normal skin turgor [No Focal Deficits] : no focal deficits [Oriented To Time, Place, And Person] : oriented to person, place, and time [Impaired Insight] : insight and judgment were intact [Affect] : the affect was normal [Mood] : the mood was normal [Memory Recent] : recent memory was not impaired [Memory Remote] : remote memory was not impaired [FreeTextEntry1] : Deferred

## 2024-02-16 NOTE — CONSULT LETTER
[Dear  ___] : Dear  [unfilled], [Courtesy Letter:] : I had the pleasure of seeing your patient, [unfilled], in my office today. [Please see my note below.] : Please see my note below. [Consult Closing:] : Thank you very much for allowing me to participate in the care of this patient.  If you have any questions, please do not hesitate to contact me. [Sincerely,] : Sincerely, [FreeTextEntry2] : Valerie Victoria [FreeTextEntry3] : Chandrakant Arriaga MD Attending Surgeon  [facility]   Cardiovascular & Thoracic Surgery [facility]

## 2024-02-17 ENCOUNTER — RX RENEWAL (OUTPATIENT)
Age: 64
End: 2024-02-17

## 2024-02-27 ENCOUNTER — RX RENEWAL (OUTPATIENT)
Age: 64
End: 2024-02-27

## 2024-02-27 RX ORDER — CLOBETASOL PROPIONATE 0.5 MG/G
0.05 CREAM TOPICAL TWICE DAILY
Qty: 60 | Refills: 1 | Status: ACTIVE | COMMUNITY
Start: 2023-06-19 | End: 1900-01-01

## 2024-02-29 ENCOUNTER — NON-APPOINTMENT (OUTPATIENT)
Age: 64
End: 2024-02-29

## 2024-03-13 RX ORDER — FLUCONAZOLE 150 MG/1
150 TABLET ORAL
Qty: 4 | Refills: 1 | Status: ACTIVE | COMMUNITY
Start: 2023-02-07 | End: 1900-01-01

## 2024-03-14 ENCOUNTER — RX RENEWAL (OUTPATIENT)
Age: 64
End: 2024-03-14

## 2024-03-14 RX ORDER — LOSARTAN POTASSIUM 25 MG/1
25 TABLET, FILM COATED ORAL
Qty: 30 | Refills: 11 | Status: ACTIVE | COMMUNITY
Start: 2023-03-07 | End: 1900-01-01

## 2024-03-16 ENCOUNTER — RX RENEWAL (OUTPATIENT)
Age: 64
End: 2024-03-16

## 2024-03-25 NOTE — H&P ADULT - ATTENDING COMMENTS
59 yo with h/o NIDDM no meds, presenting with 1 day of abd pain and found to have hypertriglyceridemic acute pancreatitis with TG 3800, lipase 2900.  He is admitted to MICU on insulin gtt with D51/2NS at 125cc/hr.  FS have been in the 150-200 range.  TG down to 2500 after 8 hours.  CT abd pending.  He is afeb and hemodynamically stable, diffuse abd tenderness on exam without rebound  Will continue IVF and insulin gtt and follow TG until under 500 when insulin gtt can be dc'd  NPO  F/U CT abd  guarded  cc time 35 min
Euthymic

## 2024-03-30 ENCOUNTER — NON-APPOINTMENT (OUTPATIENT)
Age: 64
End: 2024-03-30

## 2024-04-04 ENCOUNTER — APPOINTMENT (OUTPATIENT)
Dept: UROLOGY | Facility: CLINIC | Age: 64
End: 2024-04-04
Payer: COMMERCIAL

## 2024-04-04 ENCOUNTER — NON-APPOINTMENT (OUTPATIENT)
Age: 64
End: 2024-04-04

## 2024-04-04 ENCOUNTER — APPOINTMENT (OUTPATIENT)
Dept: OPHTHALMOLOGY | Facility: CLINIC | Age: 64
End: 2024-04-04
Payer: COMMERCIAL

## 2024-04-04 VITALS
RESPIRATION RATE: 16 BRPM | HEART RATE: 89 BPM | WEIGHT: 225 LBS | TEMPERATURE: 98.2 F | SYSTOLIC BLOOD PRESSURE: 138 MMHG | BODY MASS INDEX: 32.21 KG/M2 | DIASTOLIC BLOOD PRESSURE: 89 MMHG | HEIGHT: 70 IN | OXYGEN SATURATION: 92 %

## 2024-04-04 DIAGNOSIS — R39.11 HESITANCY OF MICTURITION: ICD-10-CM

## 2024-04-04 DIAGNOSIS — N48.1 BALANITIS: ICD-10-CM

## 2024-04-04 PROCEDURE — 92012 INTRM OPH EXAM EST PATIENT: CPT | Mod: 25

## 2024-04-04 PROCEDURE — 92083 EXTENDED VISUAL FIELD XM: CPT

## 2024-04-04 PROCEDURE — 99214 OFFICE O/P EST MOD 30 MIN: CPT

## 2024-04-04 RX ORDER — TADALAFIL 5 MG/1
5 TABLET ORAL
Qty: 90 | Refills: 3 | Status: ACTIVE | COMMUNITY
Start: 2023-08-08 | End: 1900-01-01

## 2024-04-05 LAB
APPEARANCE: CLEAR
BACTERIA: NEGATIVE /HPF
BILIRUBIN URINE: NEGATIVE
BLOOD URINE: NEGATIVE
CAST: 0 /LPF
COLOR: YELLOW
EPITHELIAL CELLS: 0 /HPF
GLUCOSE QUALITATIVE U: >=1000 MG/DL
KETONES URINE: NEGATIVE MG/DL
LEUKOCYTE ESTERASE URINE: NEGATIVE
MICROSCOPIC-UA: NORMAL
NITRITE URINE: NEGATIVE
PH URINE: 6.5
PROTEIN URINE: NEGATIVE MG/DL
RED BLOOD CELLS URINE: 0 /HPF
SPECIFIC GRAVITY URINE: >1.03
UROBILINOGEN URINE: 0.2 MG/DL
WHITE BLOOD CELLS URINE: 0 /HPF

## 2024-04-05 RX ORDER — FLUCONAZOLE 150 MG/1
150 TABLET ORAL
Qty: 5 | Refills: 0 | Status: ACTIVE | COMMUNITY
Start: 2024-04-04 | End: 1900-01-01

## 2024-04-05 NOTE — HISTORY OF PRESENT ILLNESS
[FreeTextEntry1] : 63-year-old male who presents for follow-up  #Concern for low T, low libido, ED Testosterone 243 ng/dL 6/2023 Repeat T 431 8/2023 - patient NOT On TRT On daily tadalafil  #LUTS PSA 11/2022 - 2.32 PSA 8/2023 - 1.97 No family history of prostate / kidney cancer Reports a slow flow. No gross hematuria. No UTIs. No incontinence AUASS: 10 - 0/1/1/3/3/0/2 QOL 3 Smoked 40 years, 1 ppd. Currently still smoking UA 0 rbc/hpf 8/2023  #Balanitis Reports intermittent balanitis episodes.  Has been taking fluconazole as needed

## 2024-04-05 NOTE — ASSESSMENT
[FreeTextEntry1] : 61-year-old male who presents for follow-up  #ED - Continue tadalafil  #BPH / PSA monitoring - PSA in Summer - Symptoms controlled and stable  #Balanitis - Recurrent balanitis. Discussed maintaining good hygiene, diabetes control.  Discussed circumcision cannot prevent recurrent episodes.  Discussed surgery, risk and benefits.  He will think about this  rpa 6 months

## 2024-04-05 NOTE — PHYSICAL EXAM
[Normal Appearance] : normal appearance [Edema] : no peripheral edema [] : no respiratory distress [Bowel Sounds] : normal bowel sounds [Urethral Meatus] : meatus normal [Epididymis] : the epididymides were normal [Testes Tenderness] : no tenderness of the testes [Testes Mass (___cm)] : there were no testicular masses

## 2024-05-07 ENCOUNTER — RX RENEWAL (OUTPATIENT)
Age: 64
End: 2024-05-07

## 2024-05-07 RX ORDER — ATORVASTATIN CALCIUM 40 MG/1
40 TABLET, FILM COATED ORAL
Qty: 90 | Refills: 3 | Status: ACTIVE | COMMUNITY
Start: 2020-10-15 | End: 1900-01-01

## 2024-05-11 ENCOUNTER — RX RENEWAL (OUTPATIENT)
Age: 64
End: 2024-05-11

## 2024-05-22 ENCOUNTER — RX RENEWAL (OUTPATIENT)
Age: 64
End: 2024-05-22

## 2024-05-31 ENCOUNTER — APPOINTMENT (OUTPATIENT)
Dept: ENDOCRINOLOGY | Facility: CLINIC | Age: 64
End: 2024-05-31
Payer: COMMERCIAL

## 2024-05-31 VITALS
HEART RATE: 80 BPM | TEMPERATURE: 98.5 F | WEIGHT: 224.38 LBS | OXYGEN SATURATION: 96 % | DIASTOLIC BLOOD PRESSURE: 80 MMHG | HEIGHT: 70 IN | BODY MASS INDEX: 32.12 KG/M2 | SYSTOLIC BLOOD PRESSURE: 119 MMHG

## 2024-05-31 DIAGNOSIS — I10 ESSENTIAL (PRIMARY) HYPERTENSION: ICD-10-CM

## 2024-05-31 DIAGNOSIS — E78.5 HYPERLIPIDEMIA, UNSPECIFIED: ICD-10-CM

## 2024-05-31 DIAGNOSIS — E66.9 OBESITY, UNSPECIFIED: ICD-10-CM

## 2024-05-31 DIAGNOSIS — E29.1 TESTICULAR HYPOFUNCTION: ICD-10-CM

## 2024-05-31 DIAGNOSIS — R80.9 PROTEINURIA, UNSPECIFIED: ICD-10-CM

## 2024-05-31 DIAGNOSIS — E55.9 VITAMIN D DEFICIENCY, UNSPECIFIED: ICD-10-CM

## 2024-05-31 LAB
GLUCOSE BLDC GLUCOMTR-MCNC: 125
HBA1C MFR BLD HPLC: 8.5

## 2024-05-31 PROCEDURE — 99214 OFFICE O/P EST MOD 30 MIN: CPT | Mod: 25

## 2024-05-31 PROCEDURE — 82962 GLUCOSE BLOOD TEST: CPT

## 2024-05-31 PROCEDURE — 83036 HEMOGLOBIN GLYCOSYLATED A1C: CPT | Mod: QW

## 2024-06-01 RX ORDER — INSULIN GLARGINE 100 [IU]/ML
100 INJECTION, SOLUTION SUBCUTANEOUS
Qty: 2 | Refills: 3 | Status: ACTIVE | COMMUNITY
Start: 2021-12-16 | End: 1900-01-01

## 2024-06-01 RX ORDER — METFORMIN HYDROCHLORIDE 1000 MG/1
1000 TABLET, COATED ORAL
Qty: 180 | Refills: 3 | Status: ACTIVE | COMMUNITY
Start: 2020-10-15 | End: 1900-01-01

## 2024-06-01 RX ORDER — REPAGLINIDE 2 MG/1
2 TABLET ORAL
Qty: 180 | Refills: 3 | Status: ACTIVE | COMMUNITY
Start: 2022-02-09 | End: 1900-01-01

## 2024-06-01 RX ORDER — ERTUGLIFLOZIN 15 MG/1
15 TABLET, FILM COATED ORAL
Qty: 90 | Refills: 3 | Status: ACTIVE | COMMUNITY
Start: 2021-03-04 | End: 1900-01-01

## 2024-06-02 ENCOUNTER — RX RENEWAL (OUTPATIENT)
Age: 64
End: 2024-06-02

## 2024-06-03 DIAGNOSIS — E11.9 TYPE 2 DIABETES MELLITUS W/OUT COMPLICATIONS: ICD-10-CM

## 2024-06-03 DIAGNOSIS — R74.8 ABNORMAL LEVELS OF OTHER SERUM ENZYMES: ICD-10-CM

## 2024-06-03 LAB
25(OH)D3 SERPL-MCNC: 35.9 NG/ML
ALBUMIN SERPL ELPH-MCNC: 4.9 G/DL
ALP BLD-CCNC: 34 U/L
ALT SERPL-CCNC: 45 U/L
ANION GAP SERPL CALC-SCNC: 15 MMOL/L
AST SERPL-CCNC: 50 U/L
BASOPHILS # BLD AUTO: 0.07 K/UL
BASOPHILS NFR BLD AUTO: 0.9 %
BILIRUB SERPL-MCNC: 0.4 MG/DL
BUN SERPL-MCNC: 14 MG/DL
CALCIUM SERPL-MCNC: 9.7 MG/DL
CHLORIDE SERPL-SCNC: 105 MMOL/L
CHOLEST SERPL-MCNC: 109 MG/DL
CO2 SERPL-SCNC: 21 MMOL/L
CREAT SERPL-MCNC: 1.06 MG/DL
CREAT SPEC-SCNC: 132 MG/DL
EGFR: 79 ML/MIN/1.73M2
EOSINOPHIL # BLD AUTO: 0.25 K/UL
EOSINOPHIL NFR BLD AUTO: 3.3 %
ESTIMATED AVERAGE GLUCOSE: 192 MG/DL
FRUCTOSAMINE SERPL-MCNC: 294 UMOL/L
GLUCOSE SERPL-MCNC: 112 MG/DL
GLYCOMARK.: 0.6 UG/ML
HBA1C MFR BLD HPLC: 8.3 %
HCT VFR BLD CALC: 45.9 %
HDLC SERPL-MCNC: 44 MG/DL
HGB BLD-MCNC: 15.3 G/DL
IMM GRANULOCYTES NFR BLD AUTO: 0.3 %
LDLC SERPL DIRECT ASSAY-MCNC: 34 MG/DL
LYMPHOCYTES # BLD AUTO: 2.67 K/UL
LYMPHOCYTES NFR BLD AUTO: 35.4 %
MAGNESIUM SERPL-MCNC: 2.1 MG/DL
MAN DIFF?: NORMAL
MCHC RBC-ENTMCNC: 30.1 PG
MCHC RBC-ENTMCNC: 33.3 GM/DL
MCV RBC AUTO: 90.2 FL
MICROALBUMIN 24H UR DL<=1MG/L-MCNC: 2.3 MG/DL
MICROALBUMIN/CREAT 24H UR-RTO: 17 MG/G
MONOCYTES # BLD AUTO: 0.46 K/UL
MONOCYTES NFR BLD AUTO: 6.1 %
NEUTROPHILS # BLD AUTO: 4.08 K/UL
NEUTROPHILS NFR BLD AUTO: 54 %
PLATELET # BLD AUTO: 244 K/UL
POTASSIUM SERPL-SCNC: 3.9 MMOL/L
PROT SERPL-MCNC: 7.5 G/DL
RBC # BLD: 5.09 M/UL
RBC # FLD: 13.1 %
SODIUM SERPL-SCNC: 141 MMOL/L
T3FREE SERPL-MCNC: 2.66 PG/ML
T4 FREE SERPL-MCNC: 1.5 NG/DL
TRIGL SERPL-MCNC: 193 MG/DL
TSH SERPL-ACNC: 0.5 UIU/ML
WBC # FLD AUTO: 7.55 K/UL

## 2024-06-03 RX ORDER — EMPAGLIFLOZIN 25 MG/1
25 TABLET, FILM COATED ORAL
Qty: 90 | Refills: 0 | Status: ACTIVE | COMMUNITY
Start: 2024-06-03 | End: 1900-01-01

## 2024-06-10 LAB
ALBUMIN SERPL ELPH-MCNC: 5 G/DL
ALP BLD-CCNC: 28 U/L
ALT SERPL-CCNC: 51 U/L
AST SERPL-CCNC: 56 U/L
BILIRUB DIRECT SERPL-MCNC: 0.3 MG/DL
BILIRUB INDIRECT SERPL-MCNC: 0.6 MG/DL
BILIRUB SERPL-MCNC: 0.8 MG/DL
PROT SERPL-MCNC: 7.4 G/DL

## 2024-06-21 NOTE — ADDENDUM
[FreeTextEntry1] : This note was written by Susi Nathan on 05/31/2024 acting as scribe for Dr. Colt Aldrich . I, Dr. Colt Aldrich, have read and attest that all the information, medical decision making and discharge instructions within are true and accurate.  POCT HbA1c and glucose carried out in office today given diabetes diagnosis. Blood will be drawn in office today.

## 2024-06-21 NOTE — HISTORY OF PRESENT ILLNESS
[FreeTextEntry1] : Mr. KEVIN GOMEZ is a 63 year old male who returns with regard to a hx of type 2 dm. The diabetes has been present for about 8 years.  He denies any history of retinopathy or nephropathy. Patient denies neuropathy. Generally feels well.  For the diabetes, he is currently taking Glargine Insulin 27 units QHS along with Metformin 1000 mg bid and Steglatro 15 mg. He too is taking Repaglinide 2 mg 1 tablet pre lunch and dinner. he often forgets to take pre-dinner pill.  Off Ozempic  HGM has shown values running in the 120-130s range in the AM and at times over 200 at bedtime. There has been no significant hypoglycemia.  He denies polyuria, polydipsia, or any visual changes. He too denies any skin lesions, skin breakdown or non-healing areas of skin. He too denies any podiatric concerns. Ophthalmologic evaluation is up to date - -follows with Dr. Patricia Daniels.   POCT A1C returned today 8.5%; previously 7.7% in November 2023  POCT glucose returned today at 125 mg/dl  Additional medical history includes that of Celiac artery dissection "a few years ago" per patient; appeared on CT scan. Also hx of ED. Hx of Pancreatitis, Hypogonadism, Hypertriglyceridemia vitamin d deficiency. The pancreatitis occurred less than 5 years ago and was felt to be due to hypertriglyceridemia.  Meds: Losartan, sildenafil, ASA, multivitamin, fenofibrate, atorvastatin 40 mg, omega-3, sublingual b12 off vitamin D  Family hx of Diabetes (mother and sister), glaucoma (father) Follows with cardiologist Dr. Cummins. PCP Dr. Samantha Carrasco.

## 2024-07-10 ENCOUNTER — APPOINTMENT (OUTPATIENT)
Dept: UROLOGY | Facility: CLINIC | Age: 64
End: 2024-07-10
Payer: COMMERCIAL

## 2024-07-10 ENCOUNTER — APPOINTMENT (OUTPATIENT)
Dept: OPHTHALMOLOGY | Facility: CLINIC | Age: 64
End: 2024-07-10
Payer: COMMERCIAL

## 2024-07-10 ENCOUNTER — NON-APPOINTMENT (OUTPATIENT)
Age: 64
End: 2024-07-10

## 2024-07-10 VITALS
BODY MASS INDEX: 32.12 KG/M2 | HEIGHT: 70 IN | TEMPERATURE: 98.5 F | WEIGHT: 224.38 LBS | OXYGEN SATURATION: 94 % | SYSTOLIC BLOOD PRESSURE: 111 MMHG | DIASTOLIC BLOOD PRESSURE: 75 MMHG | RESPIRATION RATE: 16 BRPM | HEART RATE: 105 BPM

## 2024-07-10 DIAGNOSIS — N52.9 MALE ERECTILE DYSFUNCTION, UNSPECIFIED: ICD-10-CM

## 2024-07-10 DIAGNOSIS — Z12.5 ENCOUNTER FOR SCREENING FOR MALIGNANT NEOPLASM OF PROSTATE: ICD-10-CM

## 2024-07-10 DIAGNOSIS — R39.11 HESITANCY OF MICTURITION: ICD-10-CM

## 2024-07-10 PROCEDURE — 92202 OPSCPY EXTND ON/MAC DRAW: CPT

## 2024-07-10 PROCEDURE — 92014 COMPRE OPH EXAM EST PT 1/>: CPT | Mod: 25

## 2024-07-10 PROCEDURE — 99214 OFFICE O/P EST MOD 30 MIN: CPT

## 2024-07-11 ENCOUNTER — APPOINTMENT (OUTPATIENT)
Dept: CARDIOLOGY | Facility: CLINIC | Age: 64
End: 2024-07-11

## 2024-07-11 LAB — PSA SERPL-MCNC: 2.46 NG/ML

## 2024-08-05 ENCOUNTER — RX RENEWAL (OUTPATIENT)
Age: 64
End: 2024-08-05

## 2024-09-09 ENCOUNTER — NON-APPOINTMENT (OUTPATIENT)
Age: 64
End: 2024-09-09

## 2024-10-16 ENCOUNTER — APPOINTMENT (OUTPATIENT)
Dept: OPHTHALMOLOGY | Facility: CLINIC | Age: 64
End: 2024-10-16

## 2024-10-17 ENCOUNTER — RX RENEWAL (OUTPATIENT)
Age: 64
End: 2024-10-17

## 2025-01-07 ENCOUNTER — APPOINTMENT (OUTPATIENT)
Dept: INTERNAL MEDICINE | Facility: CLINIC | Age: 65
End: 2025-01-07
Payer: COMMERCIAL

## 2025-01-07 VITALS
HEIGHT: 70 IN | WEIGHT: 218 LBS | TEMPERATURE: 97.6 F | BODY MASS INDEX: 31.21 KG/M2 | SYSTOLIC BLOOD PRESSURE: 150 MMHG | HEART RATE: 89 BPM | DIASTOLIC BLOOD PRESSURE: 96 MMHG

## 2025-01-07 DIAGNOSIS — Z00.00 ENCOUNTER FOR GENERAL ADULT MEDICAL EXAMINATION W/OUT ABNORMAL FINDINGS: ICD-10-CM

## 2025-01-07 DIAGNOSIS — E66.9 OBESITY, UNSPECIFIED: ICD-10-CM

## 2025-01-07 DIAGNOSIS — E11.9 TYPE 2 DIABETES MELLITUS W/OUT COMPLICATIONS: ICD-10-CM

## 2025-01-07 DIAGNOSIS — I77.79 DISSECTION OF OTHER SPECIFIED ARTERY: ICD-10-CM

## 2025-01-07 DIAGNOSIS — E78.5 HYPERLIPIDEMIA, UNSPECIFIED: ICD-10-CM

## 2025-01-07 PROCEDURE — 99396 PREV VISIT EST AGE 40-64: CPT | Mod: 25

## 2025-01-07 PROCEDURE — G0008: CPT

## 2025-01-07 PROCEDURE — G0444 DEPRESSION SCREEN ANNUAL: CPT | Mod: 59

## 2025-01-07 PROCEDURE — 90656 IIV3 VACC NO PRSV 0.5 ML IM: CPT

## 2025-01-07 RX ORDER — (INSULIN DEGLUDEC AND LIRAGLUTIDE) 100; 3.6 [IU]/ML; MG/ML
100-3.6 INJECTION, SOLUTION SUBCUTANEOUS DAILY
Refills: 0 | Status: ACTIVE | COMMUNITY
Start: 2025-01-07

## 2025-01-13 LAB
ALBUMIN SERPL ELPH-MCNC: 5 G/DL
ALP BLD-CCNC: 36 U/L
ALT SERPL-CCNC: 25 U/L
ANION GAP SERPL CALC-SCNC: 12 MMOL/L
AST SERPL-CCNC: 26 U/L
BASOPHILS # BLD AUTO: 0.06 K/UL
BASOPHILS NFR BLD AUTO: 1 %
BILIRUB SERPL-MCNC: 0.4 MG/DL
BUN SERPL-MCNC: 18 MG/DL
CALCIUM SERPL-MCNC: 10 MG/DL
CHLORIDE SERPL-SCNC: 105 MMOL/L
CHOLEST SERPL-MCNC: 161 MG/DL
CO2 SERPL-SCNC: 23 MMOL/L
CREAT SERPL-MCNC: 1.03 MG/DL
EGFR: 82 ML/MIN/1.73M2
EOSINOPHIL # BLD AUTO: 0.36 K/UL
EOSINOPHIL NFR BLD AUTO: 5.9 %
ESTIMATED AVERAGE GLUCOSE: 151 MG/DL
GLUCOSE SERPL-MCNC: 233 MG/DL
HBA1C MFR BLD HPLC: 6.9 %
HCT VFR BLD CALC: 46.2 %
HDLC SERPL-MCNC: 45 MG/DL
HGB BLD-MCNC: 15.4 G/DL
IMM GRANULOCYTES NFR BLD AUTO: 0.2 %
LDLC SERPL CALC-MCNC: 75 MG/DL
LYMPHOCYTES # BLD AUTO: 1.72 K/UL
LYMPHOCYTES NFR BLD AUTO: 28.1 %
M TB IFN-G BLD-IMP: NEGATIVE
MAN DIFF?: NORMAL
MCHC RBC-ENTMCNC: 30.4 PG
MCHC RBC-ENTMCNC: 33.3 G/DL
MCV RBC AUTO: 91.1 FL
MONOCYTES # BLD AUTO: 0.39 K/UL
MONOCYTES NFR BLD AUTO: 6.4 %
NEUTROPHILS # BLD AUTO: 3.58 K/UL
NEUTROPHILS NFR BLD AUTO: 58.4 %
NONHDLC SERPL-MCNC: 117 MG/DL
PLATELET # BLD AUTO: 220 K/UL
POTASSIUM SERPL-SCNC: 4.3 MMOL/L
PROT SERPL-MCNC: 8.2 G/DL
QUANTIFERON TB PLUS MITOGEN MINUS NIL: >10 IU/ML
QUANTIFERON TB PLUS NIL: 0.05 IU/ML
QUANTIFERON TB PLUS TB1 MINUS NIL: 0 IU/ML
QUANTIFERON TB PLUS TB2 MINUS NIL: 0.01 IU/ML
RBC # BLD: 5.07 M/UL
RBC # FLD: 12.8 %
SODIUM SERPL-SCNC: 141 MMOL/L
TRIGL SERPL-MCNC: 259 MG/DL
WBC # FLD AUTO: 6.12 K/UL

## 2025-02-06 ENCOUNTER — APPOINTMENT (OUTPATIENT)
Dept: PULMONOLOGY | Facility: CLINIC | Age: 65
End: 2025-02-06
Payer: COMMERCIAL

## 2025-02-06 VITALS
TEMPERATURE: 97.1 F | DIASTOLIC BLOOD PRESSURE: 86 MMHG | BODY MASS INDEX: 31.38 KG/M2 | RESPIRATION RATE: 16 BRPM | HEIGHT: 70 IN | HEART RATE: 85 BPM | OXYGEN SATURATION: 94 % | WEIGHT: 219.2 LBS | SYSTOLIC BLOOD PRESSURE: 133 MMHG

## 2025-02-06 DIAGNOSIS — G47.33 OBSTRUCTIVE SLEEP APNEA (ADULT) (PEDIATRIC): ICD-10-CM

## 2025-02-06 DIAGNOSIS — Z12.2 ENCOUNTER FOR SCREENING FOR MALIGNANT NEOPLASM OF RESPIRATORY ORGANS: ICD-10-CM

## 2025-02-06 PROCEDURE — G2211 COMPLEX E/M VISIT ADD ON: CPT

## 2025-02-06 PROCEDURE — 99215 OFFICE O/P EST HI 40 MIN: CPT

## 2025-02-10 ENCOUNTER — RX RENEWAL (OUTPATIENT)
Age: 65
End: 2025-02-10

## 2025-04-14 ENCOUNTER — RX RENEWAL (OUTPATIENT)
Age: 65
End: 2025-04-14

## 2025-06-03 ENCOUNTER — RX RENEWAL (OUTPATIENT)
Age: 65
End: 2025-06-03

## 2025-06-13 NOTE — HISTORY OF PRESENT ILLNESS
Home] : at home, [unfilled] , at the time of the visit. [Medical Office: ([facility])___] : at the medical office located in  [FreeTextEntry8] : 60 y.o. male, PMHx diabetes, pancreatitis 2/2 hypertriglyceridemia, anxiety, depression, obesity, PINKY (uses CPAP), incidental finding of celiac artery disection on abdominal imaging. \par \par Diagnosed with pneumonia last week.  Taking abx x 7 days, last dose tonight.  Seen at urgent care, [place] [facility], chest x-ray done.  \par Presented with c/o headache, weakness, fever/chills, body aches.  No dyspnea, no cough, no URI symptoms.  Negative COVID tests x 5. \par Now with mild dry cough, but feeling overall well.  No longer with fever/chills, energy better.  \par

## 2025-07-09 NOTE — ED PROVIDER NOTE - NS ED MD [facility]
ED Physician Note      Patient : Paola Adames Age: 28 year old Sex: male   MRN: [id number] Encounter Date: 7/9/2025      History     Chief Complaint   Patient presents with    Chest Pain       HPI: 28 year old male presents with throat tightness and heart palpitations that lasted several seconds.  Patient reports that he has had similar symptoms before and have come to this ED multiple times in the past year with negative cardiac workup.  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  He tried to go to his car to sleep but could not and return home and still cannot sleep.  Then at 1 AM approximately patient says he started to feel like his throat was tightening and short of breath and felt a heart flutter for several seconds which went away.  He was worried about his heart health and came to the ED. patient says that he is under a lot of stress with school as he is taking summer classes and doing research.  He also told me that he had a gambling problem that was causing a lot of stress but stopped gambling 6 months ago.  He also states that he used to sleep in his room but has lately been sleeping in the living room couch as he does not get good sleep in his room.  Patient says he drinks 1 cup of coffee a day, used to drink an energy drink a day but stopped after a previous ED visit.  Patient states he smokes a vape every day but denies cannabis, cocaine, stimulant use.  Denies chest pain, shortness of breath, nausea/vomiting, abdominal pain, cough, fever/chills, diarrhea/constipation.  Patient does not have a psychiatric history, was advised to see a psychologist through his school but did not want to meet with one.      No Known Allergies    Past Medical History:   Diagnosis Date    ADHD        No past surgical history on file.    No family history on file.    Social History     Tobacco Use     Smoking status: Never    Smokeless tobacco: Former   Vaping Use    Vaping status: Every Day   Substance Use Topics    Alcohol use: Never    Drug use: Never          Physical Exam     ED Triage Vitals   ED Triage Vitals Group      Temp 07/09/25 0104 97 °F (36.1 °C)      Heart Rate 07/09/25 0104 (!) 57      Resp 07/09/25 0104 18      BP 07/09/25 0104 120/70      SpO2 07/09/25 0104 96 %      EtCO2 mmHg --       Height --       Weight --       Weight Scale Used --       BMI (Calculated) --       IBW/kg (Calculated) --        Physical Exam:  Physical Exam  Vitals and nursing note reviewed.   Constitutional:       General: He is not in acute distress.     Appearance: He is not ill-appearing.   Cardiovascular:      Rate and Rhythm: Regular rhythm. Bradycardia present.      Pulses:           Radial pulses are 2+ on the right side and 2+ on the left side.      Heart sounds: Normal heart sounds.   Pulmonary:      Effort: Pulmonary effort is normal. No respiratory distress.      Breath sounds: Normal breath sounds.   Abdominal:      General: Bowel sounds are normal.      Palpations: Abdomen is soft.      Tenderness: There is no abdominal tenderness. There is no guarding.   Skin:     General: Skin is warm and dry.   Neurological:      General: No focal deficit present.      Mental Status: He is alert.   Psychiatric:         Mood and Affect: Mood is anxious.          ED Course      ED Course as of 07/09/25 0428 Wed Jul 09, 2025 0247 Discussed results with pt about neg CXR, reassuring EKG and trop, along with no risk factors, unlikely ACS. Given pt's stress, similar prior episodes, and clinical picture, explained to the pt that it is likely panic disorder. Pt amenable for psychiatric follow up. [MK]   0300 Pt told me he took two propranolols today, likely contributing to pt's bradycardia [MK]   0315 Electrocardiogram 12-Lead  I independently reviewed the EKG, my interpretation is HR 48, regular rhythm, no ST elevations or  depressions [MK]      ED Course User Index  [MK] Catarina Aldana MD       Procedures:   None      Lab Results   (I have personally reviewed all lab results that have resulted prior to the end of my scheduled shift)  Results for orders placed or performed during the hospital encounter of 07/09/25   Comprehensive Metabolic Panel    Specimen: Blood, Venous   Result Value Ref Range    Fasting Status      Sodium 140 135 - 145 mmol/L    Potassium 4.4 3.4 - 5.1 mmol/L    Chloride 104 97 - 110 mmol/L    Carbon Dioxide 26 21 - 32 mmol/L    Anion Gap 14 7 - 19 mmol/L    Glucose 101 (H) 70 - 99 mg/dL    BUN 14 6 - 20 mg/dL    Creatinine 0.89 0.67 - 1.17 mg/dL    Glomerular Filtration Rate >90 >=60    BUN/Cr 16 7 - 25    Calcium 9.4 8.4 - 10.2 mg/dL    Bilirubin, Total 0.5 0.2 - 1.0 mg/dL    GOT/AST 34 <=37 Units/L    GPT/ALT 37 <64 Units/L    Alkaline Phosphatase 73 45 - 117 Units/L    Albumin 4.3 3.4 - 5.0 g/dL    Protein, Total 7.1 6.4 - 8.2 g/dL    Globulin 2.8 2.0 - 4.0 g/dL    A/G Ratio 1.5 1.0 - 2.4   TROPONIN I, HIGH SENSITIVITY    Specimen: Blood, Venous   Result Value Ref Range    Troponin I, High Sensitivity <4 <77 ng/L   CBC with Automated Differential (performable only)    Specimen: Blood, Venous   Result Value Ref Range    WBC 8.0 4.2 - 11.0 K/mcL    RBC 5.22 4.50 - 5.90 mil/mcL    HGB 14.8 13.0 - 17.0 g/dL    HCT 44.2 39.0 - 51.0 %    MCV 84.7 78.0 - 100.0 fl    MCH 28.4 26.0 - 34.0 pg    MCHC 33.5 32.0 - 36.5 g/dL    RDW-CV 13.2 11.0 - 15.0 %    RDW-SD 41.1 39.0 - 50.0 fL     140 - 450 K/mcL    NRBC 0 <=0 /100 WBC    Neutrophil, Percent 47 %    Lymphocytes, Percent 40 %    Mono, Percent 9 %    Eosinophils, Percent 3 %    Basophils, Percent 1 %    Immature Granulocytes 0 %    Absolute Neutrophils 3.8 1.8 - 7.7 K/mcL    Absolute Lymphocytes 3.2 1.0 - 4.8 K/mcL    Absolute Monocytes 0.7 0.3 - 0.9 K/mcL    Absolute Eosinophils  0.2 0.0 - 0.5 K/mcL    Absolute Basophils 0.0 0.0 - 0.3 K/mcL    Absolute Immature  Granulocytes 0.0 0.0 - 0.2 K/mcL         Radiology Results   (I will review all radiology final reads completed prior to the end of my scheduled shift)  XR CHEST AP OR PA   Final Result   No acute cardiopulmonary findings.         Electronically Signed by: KEVIN SOL M.D.    Signed on: 7/9/2025 2:37 AM    Workstation ID: [id number]          Clinical Impression & ED Diagnosis 7/9/2025  4:28 AM  ED Diagnosis   1. Anxiety        2. Palpitations              Disposition        Discharge 7/9/2025  3:30 AM  d Agustín Adames discharge to home/self care.          Catarina Aldana MD   7/9/20254:28 AM  ED Medication Orders (From admission, onward)      None          If a scribe was used.The documentation recorded by the scribe accurately and completely reflects the service(s) I personally performed and the decisions made by me.                          Catarina Aldana MD  Resident  07/09/25 0428     Yes

## 2025-07-29 ENCOUNTER — APPOINTMENT (OUTPATIENT)
Dept: UROLOGY | Facility: CLINIC | Age: 65
End: 2025-07-29

## 2025-09-10 DIAGNOSIS — E11.9 TYPE 2 DIABETES MELLITUS W/OUT COMPLICATIONS: ICD-10-CM

## 2025-09-10 RX ORDER — ELECTROLYTES/DEXTROSE
32G X 4 MM SOLUTION, ORAL ORAL
Qty: 1 | Refills: 3 | Status: ACTIVE | COMMUNITY
Start: 2025-09-10 | End: 1900-01-01